# Patient Record
Sex: FEMALE | Race: BLACK OR AFRICAN AMERICAN | NOT HISPANIC OR LATINO | Employment: STUDENT | URBAN - METROPOLITAN AREA
[De-identification: names, ages, dates, MRNs, and addresses within clinical notes are randomized per-mention and may not be internally consistent; named-entity substitution may affect disease eponyms.]

---
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
## 2022-05-01 ENCOUNTER — HOSPITAL ENCOUNTER (EMERGENCY)
Facility: OTHER | Age: 22
Discharge: HOME | End: 2022-05-01
Attending: EMERGENCY MEDICINE
Payer: MEDICAID

## 2022-05-01 VITALS
WEIGHT: 114 LBS | OXYGEN SATURATION: 100 % | SYSTOLIC BLOOD PRESSURE: 97 MMHG | HEIGHT: 63 IN | BODY MASS INDEX: 20.2 KG/M2 | RESPIRATION RATE: 16 BRPM | HEART RATE: 62 BPM | TEMPERATURE: 99 F | DIASTOLIC BLOOD PRESSURE: 55 MMHG

## 2022-05-01 DIAGNOSIS — N12 PYELONEPHRITIS: Primary | ICD-10-CM

## 2022-05-01 DIAGNOSIS — N83.202 CYST OF LEFT OVARY: ICD-10-CM

## 2022-05-01 DIAGNOSIS — R11.0 NAUSEA: ICD-10-CM

## 2022-05-01 LAB
ALBUMIN SERPL BCP-MCNC: 4.1 G/DL (ref 3.5–5.2)
ALP SERPL-CCNC: 52 U/L (ref 55–135)
ALT SERPL W/O P-5'-P-CCNC: 17 U/L (ref 10–44)
ANION GAP SERPL CALC-SCNC: 12 MMOL/L (ref 8–16)
AST SERPL-CCNC: 14 U/L (ref 10–40)
B-HCG UR QL: NEGATIVE
BACTERIA #/AREA URNS HPF: ABNORMAL /HPF
BASOPHILS # BLD AUTO: 0.05 K/UL (ref 0–0.2)
BASOPHILS NFR BLD: 0.5 % (ref 0–1.9)
BILIRUB SERPL-MCNC: 0.5 MG/DL (ref 0.1–1)
BILIRUB UR QL STRIP: ABNORMAL
BUN SERPL-MCNC: 6 MG/DL (ref 6–20)
CALCIUM SERPL-MCNC: 8.8 MG/DL (ref 8.7–10.5)
CHLORIDE SERPL-SCNC: 106 MMOL/L (ref 95–110)
CLARITY UR: ABNORMAL
CO2 SERPL-SCNC: 21 MMOL/L (ref 23–29)
COLOR UR: ABNORMAL
CREAT SERPL-MCNC: 0.8 MG/DL (ref 0.5–1.4)
CTP QC/QA: YES
DIFFERENTIAL METHOD: ABNORMAL
EOSINOPHIL # BLD AUTO: 0 K/UL (ref 0–0.5)
EOSINOPHIL NFR BLD: 0.1 % (ref 0–8)
ERYTHROCYTE [DISTWIDTH] IN BLOOD BY AUTOMATED COUNT: 12.5 % (ref 11.5–14.5)
EST. GFR  (AFRICAN AMERICAN): >60 ML/MIN/1.73 M^2
EST. GFR  (NON AFRICAN AMERICAN): >60 ML/MIN/1.73 M^2
GLUCOSE SERPL-MCNC: 103 MG/DL (ref 70–110)
GLUCOSE UR QL STRIP: NEGATIVE
HCT VFR BLD AUTO: 40.6 % (ref 37–48.5)
HGB BLD-MCNC: 13.7 G/DL (ref 12–16)
HGB UR QL STRIP: ABNORMAL
HYALINE CASTS #/AREA URNS LPF: 0 /LPF
IMM GRANULOCYTES # BLD AUTO: 0.02 K/UL (ref 0–0.04)
IMM GRANULOCYTES NFR BLD AUTO: 0.2 % (ref 0–0.5)
KETONES UR QL STRIP: ABNORMAL
LEUKOCYTE ESTERASE UR QL STRIP: ABNORMAL
LIPASE SERPL-CCNC: 9 U/L (ref 4–60)
LYMPHOCYTES # BLD AUTO: 1.4 K/UL (ref 1–4.8)
LYMPHOCYTES NFR BLD: 14.4 % (ref 18–48)
MCH RBC QN AUTO: 33.7 PG (ref 27–31)
MCHC RBC AUTO-ENTMCNC: 33.7 G/DL (ref 32–36)
MCV RBC AUTO: 100 FL (ref 82–98)
MICROSCOPIC COMMENT: ABNORMAL
MONOCYTES # BLD AUTO: 0.5 K/UL (ref 0.3–1)
MONOCYTES NFR BLD: 5.3 % (ref 4–15)
NEUTROPHILS # BLD AUTO: 7.7 K/UL (ref 1.8–7.7)
NEUTROPHILS NFR BLD: 79.5 % (ref 38–73)
NITRITE UR QL STRIP: POSITIVE
NRBC BLD-RTO: 0 /100 WBC
PH UR STRIP: 6 [PH] (ref 5–8)
PLATELET # BLD AUTO: 211 K/UL (ref 150–450)
PMV BLD AUTO: 10.2 FL (ref 9.2–12.9)
POTASSIUM SERPL-SCNC: 3.7 MMOL/L (ref 3.5–5.1)
PROT SERPL-MCNC: 6.8 G/DL (ref 6–8.4)
PROT UR QL STRIP: ABNORMAL
RBC # BLD AUTO: 4.06 M/UL (ref 4–5.4)
RBC #/AREA URNS HPF: >100 /HPF (ref 0–4)
SODIUM SERPL-SCNC: 139 MMOL/L (ref 136–145)
SP GR UR STRIP: 1.02 (ref 1–1.03)
SQUAMOUS #/AREA URNS HPF: 7 /HPF
URN SPEC COLLECT METH UR: ABNORMAL
UROBILINOGEN UR STRIP-ACNC: 1 EU/DL
WBC # BLD AUTO: 9.66 K/UL (ref 3.9–12.7)
WBC #/AREA URNS HPF: 7 /HPF (ref 0–5)

## 2022-05-01 PROCEDURE — 63600175 PHARM REV CODE 636 W HCPCS: Performed by: NURSE PRACTITIONER

## 2022-05-01 PROCEDURE — 25000003 PHARM REV CODE 250: Performed by: NURSE PRACTITIONER

## 2022-05-01 PROCEDURE — 85025 COMPLETE CBC W/AUTO DIFF WBC: CPT | Performed by: NURSE PRACTITIONER

## 2022-05-01 PROCEDURE — 81000 URINALYSIS NONAUTO W/SCOPE: CPT | Performed by: NURSE PRACTITIONER

## 2022-05-01 PROCEDURE — 99285 EMERGENCY DEPT VISIT HI MDM: CPT | Mod: 25

## 2022-05-01 PROCEDURE — 96375 TX/PRO/DX INJ NEW DRUG ADDON: CPT

## 2022-05-01 PROCEDURE — 83690 ASSAY OF LIPASE: CPT | Performed by: NURSE PRACTITIONER

## 2022-05-01 PROCEDURE — 80053 COMPREHEN METABOLIC PANEL: CPT | Performed by: NURSE PRACTITIONER

## 2022-05-01 PROCEDURE — 96365 THER/PROPH/DIAG IV INF INIT: CPT

## 2022-05-01 PROCEDURE — 81025 URINE PREGNANCY TEST: CPT | Performed by: NURSE PRACTITIONER

## 2022-05-01 RX ORDER — HYDROCODONE BITARTRATE AND ACETAMINOPHEN 5; 325 MG/1; MG/1
1 TABLET ORAL EVERY 8 HOURS PRN
Qty: 9 TABLET | Refills: 0 | Status: SHIPPED | OUTPATIENT
Start: 2022-05-01 | End: 2022-05-05

## 2022-05-01 RX ORDER — HYDROCODONE BITARTRATE AND ACETAMINOPHEN 5; 325 MG/1; MG/1
1 TABLET ORAL
Status: COMPLETED | OUTPATIENT
Start: 2022-05-01 | End: 2022-05-01

## 2022-05-01 RX ORDER — ONDANSETRON 2 MG/ML
4 INJECTION INTRAMUSCULAR; INTRAVENOUS
Status: COMPLETED | OUTPATIENT
Start: 2022-05-01 | End: 2022-05-01

## 2022-05-01 RX ORDER — ONDANSETRON 4 MG/1
4 TABLET, ORALLY DISINTEGRATING ORAL EVERY 8 HOURS PRN
Qty: 20 TABLET | Refills: 0 | Status: SHIPPED | OUTPATIENT
Start: 2022-05-01 | End: 2022-11-21 | Stop reason: SDUPTHER

## 2022-05-01 RX ORDER — KETOROLAC TROMETHAMINE 30 MG/ML
15 INJECTION, SOLUTION INTRAMUSCULAR; INTRAVENOUS
Status: COMPLETED | OUTPATIENT
Start: 2022-05-01 | End: 2022-05-01

## 2022-05-01 RX ORDER — IBUPROFEN 600 MG/1
600 TABLET ORAL EVERY 6 HOURS PRN
Qty: 20 TABLET | Refills: 0 | Status: SHIPPED | OUTPATIENT
Start: 2022-05-01

## 2022-05-01 RX ADMIN — SODIUM CHLORIDE 1000 ML: 0.9 INJECTION, SOLUTION INTRAVENOUS at 07:05

## 2022-05-01 RX ADMIN — CEFTRIAXONE 1 G: 1 INJECTION, SOLUTION INTRAVENOUS at 07:05

## 2022-05-01 RX ADMIN — HYDROCODONE BITARTRATE AND ACETAMINOPHEN 1 TABLET: 5; 325 TABLET ORAL at 10:05

## 2022-05-01 RX ADMIN — ONDANSETRON 4 MG: 2 INJECTION INTRAMUSCULAR; INTRAVENOUS at 07:05

## 2022-05-01 RX ADMIN — KETOROLAC TROMETHAMINE 15 MG: 30 INJECTION, SOLUTION INTRAMUSCULAR at 07:05

## 2022-05-02 ENCOUNTER — TELEPHONE (OUTPATIENT)
Dept: OBSTETRICS AND GYNECOLOGY | Facility: CLINIC | Age: 22
End: 2022-05-02
Payer: MEDICAID

## 2022-05-02 NOTE — ED TRIAGE NOTES
Pt presents to ED c/o lower abdominal pain, L flank pain starting at approx 2pm. Also states that she vomited once around the same time pain started and experienced 1 episode of diarrhea. Has not vomited or had diarrhea since. Denies vaginal symptoms or urinary symptoms. Denies fever at home. States has only tried to eat once today and was not able to hold it down.

## 2022-05-02 NOTE — CONSULTS
Consult Note  Gynecology      SUBJECTIVE:     History of Present Illness:  Julius Peguero is a 21 y.o. G0 currently being evaluated for abdominal pain. The patient reports that she experienced severe, acute left-sided abdominal pain two hours ago. She states she experienced sweating, nausea, and one episode of vomiting due to the pain. Denies fever or pain with urination. She reports that she has experienced pain in her left lower abdomen during intercourse. She was evaluated for this complaint on Friday at [facility], where she was treated for BV. She denies pain with urination or defecation. She is currently on her menstrual cycle and states her cycles are typically very painful.   Since being evaluated in the ED the patient has not experienced any further episodes of pain and is tolerating sips of water.      GYN History:  Currently on menstrual cycle, cycles occur every 26-30 days, last 3-7 days, described as light bleeding but painful  Last pap - n/a  Sexually active, uses condoms occasionally for contraception. She has previously used Depo  Denies h/o STDs, fibroids, or ovarian cysts      PMHx: No past medical history on file.    PSHx:   Past Surgical History:   Procedure Laterality Date    MOUTH SURGERY         All: Review of patient's allergies indicates:  No Known Allergies    Meds: (Not in a hospital admission)      SH:   Social History     Socioeconomic History    Marital status: Single   Tobacco Use    Smoking status: Never Smoker    Smokeless tobacco: Never Used   Substance and Sexual Activity    Alcohol use: No    Drug use: Yes     Types: Marijuana       FH: No family history on file.    Review of Systems:  Constitutional: no fever or chills  Respiratory: no cough or shortness of breath  Cardiovascular: no chest pain or palpitations  Gastrointestinal: +vomiting, tolerating diet, negative for change in bowel habits  Genitourinary: +vaginal bleeding, no hematuria or dysuria, negative for  urinary incontinence  Integument/Breast: no rash or pruritis, negative for breast lump, nipple discharge and skin lesion(s)  Hematologic/Lymphatic: no easy bruising or lymphadenopathy     OBJECTIVE:     Temp:  [98.6 °F (37 °C)] 98.6 °F (37 °C)  Pulse:  [54-75] 62  Resp:  [16-19] 16  SpO2:  [98 %-100 %] 100 %  BP: ()/(55-74) 97/55    Recent Results (from the past 24 hour(s))   Urinalysis, Reflex to Urine Culture Urine, Clean Catch    Collection Time: 05/01/22  6:41 PM    Specimen: Urine   Result Value Ref Range    Specimen UA Urine, Clean Catch     Color, UA Red (A) Yellow, Straw, Sandra    Appearance, UA Cloudy (A) Clear    pH, UA 6.0 5.0 - 8.0    Specific Gravity, UA 1.025 1.005 - 1.030    Protein, UA 2+ (A) Negative    Glucose, UA Negative Negative    Ketones, UA Trace (A) Negative    Bilirubin (UA) 1+ (A) Negative    Occult Blood UA 3+ (A) Negative    Nitrite, UA Positive (A) Negative    Urobilinogen, UA 1.0 <2.0 EU/dL    Leukocytes, UA Trace (A) Negative   Urinalysis Microscopic    Collection Time: 05/01/22  6:41 PM   Result Value Ref Range    RBC, UA >100 (H) 0 - 4 /hpf    WBC, UA 7 (H) 0 - 5 /hpf    Bacteria Moderate (A) None-Occ /hpf    Squam Epithel, UA 7 /hpf    Hyaline Casts, UA 0 0-1/lpf /lpf    Microscopic Comment SEE COMMENT    POCT urine pregnancy    Collection Time: 05/01/22  6:43 PM   Result Value Ref Range    POC Preg Test, Ur Negative Negative     Acceptable Yes    CBC W/ AUTO DIFFERENTIAL    Collection Time: 05/01/22  6:48 PM   Result Value Ref Range    WBC 9.66 3.90 - 12.70 K/uL    RBC 4.06 4.00 - 5.40 M/uL    Hemoglobin 13.7 12.0 - 16.0 g/dL    Hematocrit 40.6 37.0 - 48.5 %     (H) 82 - 98 fL    MCH 33.7 (H) 27.0 - 31.0 pg    MCHC 33.7 32.0 - 36.0 g/dL    RDW 12.5 11.5 - 14.5 %    Platelets 211 150 - 450 K/uL    MPV 10.2 9.2 - 12.9 fL    Immature Granulocytes 0.2 0.0 - 0.5 %    Gran # (ANC) 7.7 1.8 - 7.7 K/uL    Immature Grans (Abs) 0.02 0.00 - 0.04 K/uL    Lymph # 1.4  1.0 - 4.8 K/uL    Mono # 0.5 0.3 - 1.0 K/uL    Eos # 0.0 0.0 - 0.5 K/uL    Baso # 0.05 0.00 - 0.20 K/uL    nRBC 0 0 /100 WBC    Gran % 79.5 (H) 38.0 - 73.0 %    Lymph % 14.4 (L) 18.0 - 48.0 %    Mono % 5.3 4.0 - 15.0 %    Eosinophil % 0.1 0.0 - 8.0 %    Basophil % 0.5 0.0 - 1.9 %    Differential Method Automated    Comp. Metabolic Panel    Collection Time: 05/01/22  6:48 PM   Result Value Ref Range    Sodium 139 136 - 145 mmol/L    Potassium 3.7 3.5 - 5.1 mmol/L    Chloride 106 95 - 110 mmol/L    CO2 21 (L) 23 - 29 mmol/L    Glucose 103 70 - 110 mg/dL    BUN 6 6 - 20 mg/dL    Creatinine 0.8 0.5 - 1.4 mg/dL    Calcium 8.8 8.7 - 10.5 mg/dL    Total Protein 6.8 6.0 - 8.4 g/dL    Albumin 4.1 3.5 - 5.2 g/dL    Total Bilirubin 0.5 0.1 - 1.0 mg/dL    Alkaline Phosphatase 52 (L) 55 - 135 U/L    AST 14 10 - 40 U/L    ALT 17 10 - 44 U/L    Anion Gap 12 8 - 16 mmol/L    eGFR if African American >60 >60 mL/min/1.73 m^2    eGFR if non African American >60 >60 mL/min/1.73 m^2   Lipase    Collection Time: 05/01/22  6:48 PM   Result Value Ref Range    Lipase 9 4 - 60 U/L         Physical Exam:  GEN: No apparent distress. Alert and oriented  CV: Regular rate and rhythm.  LUNGS: Normal work of breathing. No distress.  ABDOMEN: Soft, mild diffuse tenderness in lower abdomen, non-distended. No guarding or rebound tenderness.  EXT: No erythema, no edema  EXT. GENITALIA: appear normal, no lesions noted  BIMANUAL: minimal bright red vaginal bleeding, narrow pelvic arch, diffuse suprapubic and adnexal pain, no masses palpated    EXAMINATION:  PELVIC ULTRASOUND     CLINICAL HISTORY:  Left adnexal mass.  CT renal stone of 05/01/2022 describing a 4.5 x 4.4 x 5 cm left adnexal cyst, which may be ovarian in origin.     TECHNIQUE:  Real-time ultrasound of the pelvis was performed transabdominally and transvaginally.     COMPARISON:  CT renal stone study 05/01/2022     FINDINGS:  The uterus measures 7.1 x 3.6 x 4.1 cm.  The endometrial stripe  measures 7.3 mm.  There are no uterine masses.     The right ovary measures 3.7 x 2.3 x 4.2 cm. Arterial flow is present.     The enlarged left ovary measures 6.6 x 4.0 x 5.3 cm. Arterial flow is present.  There is a 5.1 x 3.4 x 4.4 cm cyst.     There is no free fluid appreciated in the pelvis on ultrasound.  Small fluid is appreciated in the right pelvis on recent CT.     Impression:  Large left ovarian cyst.  No uterine masses.    ASSESSMENT/PLAN:     Assessment:  22 yo G0 who presents with acute left-sided abdominal pain with left ovarian cyst    Plan:  1. Ovarian cyst  - 5cm left ovarian cyst noted on pelvic ultrasound, arterial flow noted to ovary   - Vital signs stable  - Diffuse, mild abdominal pain on exam, no guarding or rebound  - Based on presentation, physical exam, and ultrasound findings not concerned for ovarian torsion at this time, however discussed the possibility of torsion with patient and enforced return precautions  - Will follow up with patient in [facility] as she does not have a primary Gyn, consider initiation of OCPs   - Recommend repeat pelvic ultrasound in 6-8 weeks ensure resolution of cyst    2. UTI  - Treatment per ED physician      Tash Santiago M.D.  OB/GYN PGY-2      Discussed plan with staff who was in agreement.

## 2022-05-02 NOTE — TELEPHONE ENCOUNTER
----- Message from Tash Santiago MD sent at 5/1/2022 11:06 PM CDT -----  Hi,    Can we please schedule this patient an ED follow up appointment in our Gyn clinic?    Thank you,  Tash Santiago M.D.  OB/GYN PGY-2

## 2022-05-02 NOTE — ED PROVIDER NOTES
"     Source of History:  Patient    Chief complaint:  Abdominal Pain and Vomiting (Pt c.o lower abd pain and left mid abd pain with vomiting onset 2 hours ago. Vomit x 1.  Pt denies urinary symptoms. Pt denies fever.  AAO x 3 nadn skin w.d mucus membrane m/p)      HPI:  Julius Peguero is a 21 y.o. female presenting with left flank and left lower abdominal pain with acute onset 2 hours ago.  She she states that she threw up the 1 meal that she is eaten today secondary to the pain.  Pain is described as sharp and stabbing.  Pain was 10/10 but has decreased since onset.She denies fever, chills, dysuria, urgency, frequency, constipation, diarrhea, pelvic pain and vaginal discharge    This is the extent to the patients complaints today here in the emergency department.    ROS: As per HPI and below:  General: No fever.  No chills.  Eyes: No visual changes.  ENT: No sore throat. No ear pain  Head: No headache.    Chest: No shortness of breath.  Cardiovascular: No chest pain.  Abdomen:+abdominal pain.  +nausea +vomiting.  Genito-Urinary: No abnormal urination. No abnormal vaginal discharge. No pelvic pain  Neurologic: No focal weakness.  No numbness.  MSK: no back pain.  Integument: No rashes or lesions.  Hematologic: No easy bruising.  Endocrine: No excessive thirst or urination.    Review of patient's allergies indicates:  No Known Allergies    PMH:  As per HPI and below:  No past medical history on file.  Past Surgical History:   Procedure Laterality Date    MOUTH SURGERY         Social History     Tobacco Use    Smoking status: Never Smoker    Smokeless tobacco: Never Used   Substance Use Topics    Alcohol use: No    Drug use: Yes     Types: Marijuana       Physical Exam:    BP (!) 97/55   Pulse 62   Temp 98.6 °F (37 °C) (Oral)   Resp 16   Ht 5' 3" (1.6 m)   Wt 51.7 kg (114 lb)   SpO2 100%   BMI 20.19 kg/m²   Nursing note and vital signs reviewed.  Appearance: Appears uncomfortable.  Eyes: No conjunctival " injection.  ENT: Oropharynx clear.    Chest/ Respiratory: Clear to auscultation bilaterally.  Good air movement.  No wheezes.  No rhonchi. No rales. No accessory muscle use.  Cardiovascular: Regular rate and rhythm.  No murmurs. No gallops. No rubs.  Abdomen: Soft.  Not distended.  TTP in LLQ and left flank  No guarding.  No rebound. Non-peritoneal.  Musculoskeletal: Good range of motion all joints.  No deformities.  Neck supple.  No meningismus.  Skin: No rashes seen.  Good turgor.  No abrasions.  No ecchymoses.  Neurologic: Motor intact.  Sensation intact.  Cerebellar intact.  Cranial nerves intact.  Mental Status:  Alert and oriented x 3.  Appropriate, conversant    Labs that have been ordered have been independently reviewed and interpreted by myself.        Initial Impression/ Differential Dx:  Urgent evaluation of 21-year-old female presenting with abdominal pain and 1 episode of vomiting.  Patient is afebrile, not toxic appearing hemodynamically stable.  On exam she has mild tenderness in the left lower quadrant and left flank.  Plan for labs, IV fluids, antiemetics, analgesia in CT renal and continue to reassess.    Differential Diagnosis includes, but is not limited to:  AAA, aortic dissection, mesenteric ischemia, perforated viscous, MI/ACS, SBO/volvulus, incarcerated/strangulated hernia, intussusception, ileus, appendicitis, cholecystitis, cholangitis, diverticulitis, esophagitis, hepatitis, nephrolithiasis, pancreatitis, gastroenteritis, colitis, IBD/IBS, biliary colic, GERD, PUD, constipation, UTI/pyelonephritis,  disorder.      MDM:        ED Course as of 05/01/22 2324   Sun May 01, 2022   1901 Preg Test, Ur: Negative [CU]   1920 CBC W/ AUTO DIFFERENTIAL(!)  No leukocytosis or left shift, stable H&H [CU]   1920 Comp. Metabolic Panel(!)  No significant electrolyte abnormalities, normal kidney function, no elevation of LFTs. [CU]   1920 NITRITE UA(!): Positive [CU]   1920 Leukocytes, UA(!): Trace [CU]    1920 RBC, UA(!): >100  Patient currently on menstrual cycle.  [CU]   1921 Occult Blood UA(!): 3+ [CU]   1921 Urinalysis, Reflex to Urine Culture Urine, Clean Catch(!)  Consistent with UTI, will treat for pyelonephritis given left flank pain. [CU]   2154 Large left ovarian cyst. 5.1 x 3.4 x 4.4 cm cyst. Will discuss with OBGYN. At bedside to reassess patient and inform her of her lab and imaging results.  She states that she is feeling better and has had improvement but not complete resolution of her pain.  Will give additional analgesia. Reports complete resolution of her nausea.  States that she thinks the nausea and vomiting with secondary to pain. [CU]   2158 Discussed with OBGYN.  Per OB cysts less than 6 cm can be managed outpatient however given acute onset of pain with associate nausea, concerned for intermittent torsion secondary to the cyst. OB at bedside for exam.  Please see consult note.They will contact her for an appointment to establish care and continue monitoring.  Will discharge patient home with NSAIDs and a few pain meds and close OBGYN follow-up.  Patient given strict return to ED precautions and discharged home in good condition.Patient educated on on signs and symptoms to monitor for and when to return to ED. Patient verbalized understanding agrees with treatment plan. All questions and concerns addressed.        [CU]      ED Course User Index  [CU] Andrew Wood NP                 Diagnostic Impression:    1. Pyelonephritis    2. Cyst of left ovary    3. Nausea         ED Disposition Condition    Discharge Good          ED Prescriptions     Medication Sig Dispense Start Date End Date Auth. Provider    ibuprofen (ADVIL,MOTRIN) 600 MG tablet Take 1 tablet (600 mg total) by mouth every 6 (six) hours as needed (nausea). 20 tablet 5/1/2022  Andrew Wood NP    HYDROcodone-acetaminophen (NORCO) 5-325 mg per tablet Take 1 tablet by mouth every 8 (eight) hours as needed for Pain. 9 tablet  5/1/2022 5/4/2022 Andrew Wood NP    ondansetron (ZOFRAN-ODT) 4 MG TbDL Take 1 tablet (4 mg total) by mouth every 8 (eight) hours as needed (nausea). 20 tablet 5/1/2022  Andrew Wood NP        Follow-up Information     Follow up With Specialties Details Why Contact Info    [facility] Obstetrics and Gynecology Schedule an appointment as soon as possible for a visit   [address], [address]  [facility] [address]  [phone]           Andrew Wood NP  05/01/22 7370

## 2022-05-04 ENCOUNTER — OFFICE VISIT (OUTPATIENT)
Dept: OBSTETRICS AND GYNECOLOGY | Facility: CLINIC | Age: 22
End: 2022-05-04
Payer: MEDICAID

## 2022-05-04 ENCOUNTER — TELEPHONE (OUTPATIENT)
Dept: OBSTETRICS AND GYNECOLOGY | Facility: CLINIC | Age: 22
End: 2022-05-04
Payer: MEDICAID

## 2022-05-04 ENCOUNTER — ANESTHESIA EVENT (OUTPATIENT)
Dept: SURGERY | Facility: OTHER | Age: 22
End: 2022-05-04
Payer: MEDICAID

## 2022-05-04 VITALS
DIASTOLIC BLOOD PRESSURE: 58 MMHG | SYSTOLIC BLOOD PRESSURE: 98 MMHG | HEIGHT: 63 IN | WEIGHT: 113.75 LBS | BODY MASS INDEX: 20.16 KG/M2

## 2022-05-04 DIAGNOSIS — N83.202 LEFT OVARIAN CYST: Primary | ICD-10-CM

## 2022-05-04 PROCEDURE — 1159F PR MEDICATION LIST DOCUMENTED IN MEDICAL RECORD: ICD-10-PCS | Mod: CPTII,,,

## 2022-05-04 PROCEDURE — 99999 PR PBB SHADOW E&M-EST. PATIENT-LVL III: CPT | Mod: PBBFAC,,,

## 2022-05-04 PROCEDURE — 99204 OFFICE O/P NEW MOD 45 MIN: CPT | Mod: S$PBB,,,

## 2022-05-04 PROCEDURE — 99213 OFFICE O/P EST LOW 20 MIN: CPT | Mod: PBBFAC,PN

## 2022-05-04 PROCEDURE — 3008F BODY MASS INDEX DOCD: CPT | Mod: CPTII,,,

## 2022-05-04 PROCEDURE — 99999 PR PBB SHADOW E&M-EST. PATIENT-LVL III: ICD-10-PCS | Mod: PBBFAC,,,

## 2022-05-04 PROCEDURE — 3074F PR MOST RECENT SYSTOLIC BLOOD PRESSURE < 130 MM HG: ICD-10-PCS | Mod: CPTII,,,

## 2022-05-04 PROCEDURE — 3078F DIAST BP <80 MM HG: CPT | Mod: CPTII,,,

## 2022-05-04 PROCEDURE — 3008F PR BODY MASS INDEX (BMI) DOCUMENTED: ICD-10-PCS | Mod: CPTII,,,

## 2022-05-04 PROCEDURE — 3074F SYST BP LT 130 MM HG: CPT | Mod: CPTII,,,

## 2022-05-04 PROCEDURE — 1159F MED LIST DOCD IN RCRD: CPT | Mod: CPTII,,,

## 2022-05-04 PROCEDURE — 3078F PR MOST RECENT DIASTOLIC BLOOD PRESSURE < 80 MM HG: ICD-10-PCS | Mod: CPTII,,,

## 2022-05-04 PROCEDURE — 99204 PR OFFICE/OUTPT VISIT, NEW, LEVL IV, 45-59 MIN: ICD-10-PCS | Mod: S$PBB,,,

## 2022-05-04 RX ORDER — MUPIROCIN 20 MG/G
OINTMENT TOPICAL
Status: CANCELLED | OUTPATIENT
Start: 2022-05-04

## 2022-05-04 RX ORDER — SODIUM CHLORIDE 9 MG/ML
INJECTION, SOLUTION INTRAVENOUS CONTINUOUS
Status: CANCELLED | OUTPATIENT
Start: 2022-05-04

## 2022-05-04 NOTE — LETTER
May 4, 2022      [facility]  [address], [address]  [facility] [address]  Phone: [phone]  Fax: [phone]       Patient: Julius Pegureo   YOB: 2000  Date of Visit: 05/04/2022    To Whom It May Concern:    Dewey Peguero  was at [facility] on 05/04/2022. The patient may return to work/school on 5/9/2022 with restriction of no lifting >10 lbs. If you have any questions or concerns, or if I can be of further assistance, please do not hesitate to contact me.    Sincerely,    Alysia Lazo MD

## 2022-05-04 NOTE — H&P
[facility] - OB GYN  History & Physical  Gynecology    SUBJECTIVE:     Chief Complaint/Reason for Admission: Left ovarian cyst    History of Present Illness:  Patient is a 21 y.o.  who presents with for laparoscopic cystectomy of left ovarian cyst.  Patient presented to ER on  with complaints of left sided abdominal pain that was associated with one episode of nausea and vomiting. Patient underwent CT scan that revealed a left ovarian cyst. TVUS revealed a 5.1 x 3.4 x 4.4 cm left adnexal cyst. Patient desires surgical removal of cyst.     (Not in a hospital admission)      Review of patient's allergies indicates:  No Known Allergies    History reviewed. No pertinent past medical history.  Past Surgical History:   Procedure Laterality Date    MOUTH SURGERY       History reviewed. No pertinent family history.  Social History     Tobacco Use    Smoking status: Never Smoker    Smokeless tobacco: Never Used   Substance Use Topics    Alcohol use: No    Drug use: Yes     Types: Marijuana       Review of Systems:  Denies weight loss/gain  Denies chest pain or shortness of breath  Denies orthopnea or lower leg edema  Denies nausea/vomiting  Denies numbness, weakness or dizziness  Denies current alcohol or substance abuse     OBJECTIVE:     Vital Signs:  To be done upon arrival    Physical Exam:  Well appearing, well nourished, in no acute distress  Normal pulmonary effort  Bowel sounds normal, no tenderness, organomegaly, masses or hernia  No lower leg edema, peripheral pulses intact  Normal gait  Alert and oriented x3, normal mood and affect        Diagnostic Results:  US Pelvis Comp with Transvag NON-OB (xpd  Order: [id number]   Status: Final result     Visible to patient: Yes (not seen)     Next appt: None     0 Result Notes    Details    Reading Physician Reading Date Result Priority   Lida Duque MD  [phone] 2022 STAT     Narrative & Impression  EXAMINATION:  PELVIC  ULTRASOUND     CLINICAL HISTORY:  Left adnexal mass.  CT renal stone of 05/01/2022 describing a 4.5 x 4.4 x 5 cm left adnexal cyst, which may be ovarian in origin.     TECHNIQUE:  Real-time ultrasound of the pelvis was performed transabdominally and transvaginally.     COMPARISON:  CT renal stone study 05/01/2022     FINDINGS:  The uterus measures 7.1 x 3.6 x 4.1 cm.  The endometrial stripe measures 7.3 mm.  There are no uterine masses.     The right ovary measures 3.7 x 2.3 x 4.2 cm. Arterial flow is present.     The enlarged left ovary measures 6.6 x 4.0 x 5.3 cm. Arterial flow is present.  There is a 5.1 x 3.4 x 4.4 cm cyst.     There is no free fluid appreciated in the pelvis on ultrasound.  Small fluid is appreciated in the right pelvis on recent CT.     Impression:     Large left ovarian cyst.     No uterine masses.        Electronically signed by: Lida Duque  Date:                                            05/01/2022  Time:                                           21:49         ASSESSMENT/PLAN:     1. Left ovarian cyst  - 5.1 x 3.4 x 4.4 cm left adnexal cyst, simple appearing on US  - Management options discussed in clinic and patient elected for surgical management  - Plan for laparoscopic cystectomy   - Risks and benefits explained in detail to patient, including but not limited to damage to internal organs, including but not limited to bowel, bladder, uterus, tubes, ovaries, nerves, arteries, veins, possible need for blood transfusion, possible need for exploratory laparotomy/oophorectomy. Patient is aware of all risks and desires laparoscopy. All questions answered. Consents signed.     Binta Freeman MD  PGY-1 OB/GYN    Seen and examined.  Agree with note.  All questions answered  SUMAN Lazo MD

## 2022-05-04 NOTE — H&P (VIEW-ONLY)
[facility] - OB GYN  History & Physical  Gynecology    SUBJECTIVE:     Chief Complaint/Reason for Admission: Left ovarian cyst    History of Present Illness:  Patient is a 21 y.o.  who presents with for laparoscopic cystectomy of left ovarian cyst.  Patient presented to ER on  with complaints of left sided abdominal pain that was associated with one episode of nausea and vomiting. Patient underwent CT scan that revealed a left ovarian cyst. TVUS revealed a 5.1 x 3.4 x 4.4 cm left adnexal cyst. Patient desires surgical removal of cyst.     (Not in a hospital admission)      Review of patient's allergies indicates:  No Known Allergies    History reviewed. No pertinent past medical history.  Past Surgical History:   Procedure Laterality Date    MOUTH SURGERY       History reviewed. No pertinent family history.  Social History     Tobacco Use    Smoking status: Never Smoker    Smokeless tobacco: Never Used   Substance Use Topics    Alcohol use: No    Drug use: Yes     Types: Marijuana       Review of Systems:  Denies weight loss/gain  Denies chest pain or shortness of breath  Denies orthopnea or lower leg edema  Denies nausea/vomiting  Denies numbness, weakness or dizziness  Denies current alcohol or substance abuse     OBJECTIVE:     Vital Signs:  To be done upon arrival    Physical Exam:  Well appearing, well nourished, in no acute distress  Normal pulmonary effort  Bowel sounds normal, no tenderness, organomegaly, masses or hernia  No lower leg edema, peripheral pulses intact  Normal gait  Alert and oriented x3, normal mood and affect        Diagnostic Results:  US Pelvis Comp with Transvag NON-OB (xpd  Order: [id number]   Status: Final result     Visible to patient: Yes (not seen)     Next appt: None     0 Result Notes    Details    Reading Physician Reading Date Result Priority   Lida Duque MD  [phone] 2022 STAT     Narrative & Impression  EXAMINATION:  PELVIC  ULTRASOUND     CLINICAL HISTORY:  Left adnexal mass.  CT renal stone of 05/01/2022 describing a 4.5 x 4.4 x 5 cm left adnexal cyst, which may be ovarian in origin.     TECHNIQUE:  Real-time ultrasound of the pelvis was performed transabdominally and transvaginally.     COMPARISON:  CT renal stone study 05/01/2022     FINDINGS:  The uterus measures 7.1 x 3.6 x 4.1 cm.  The endometrial stripe measures 7.3 mm.  There are no uterine masses.     The right ovary measures 3.7 x 2.3 x 4.2 cm. Arterial flow is present.     The enlarged left ovary measures 6.6 x 4.0 x 5.3 cm. Arterial flow is present.  There is a 5.1 x 3.4 x 4.4 cm cyst.     There is no free fluid appreciated in the pelvis on ultrasound.  Small fluid is appreciated in the right pelvis on recent CT.     Impression:     Large left ovarian cyst.     No uterine masses.        Electronically signed by: Lida Duque  Date:                                            05/01/2022  Time:                                           21:49         ASSESSMENT/PLAN:     1. Left ovarian cyst  - 5.1 x 3.4 x 4.4 cm left adnexal cyst, simple appearing on US  - Management options discussed in clinic and patient elected for surgical management  - Plan for laparoscopic cystectomy   - Risks and benefits explained in detail to patient, including but not limited to damage to internal organs, including but not limited to bowel, bladder, uterus, tubes, ovaries, nerves, arteries, veins, possible need for blood transfusion, possible need for exploratory laparotomy/oophorectomy. Patient is aware of all risks and desires laparoscopy. All questions answered. Consents signed.     Binta Freeman MD  PGY-1 OB/GYN    Seen and examined.  Agree with note.  All questions answered  SUMAN Lazo MD

## 2022-05-04 NOTE — PROGRESS NOTES
Gyn Clinic  [facility]st[facility] Gyne[facility]       SUBJECTIVE:     Chief Complaint: Other (ED Follow-Up)     History of Present Illness:  Patient is a 22 yo G0 who presents to clinic as an ER follow up. Patient presented to ER on  with complaints of left sided abdominal pain that was associated with one episode of nausea and vomiting. Patient underwent CT scan that revealed a left ovarian cyst. TVUS revealed a 5.1 x 3.4 x 4.4 cm left adnexal cyst. Patient did not have a primary OB and was instructed to follow up with our clinic. Patient reports regular painful monthly cycles with light bleeding. She reports intermittent LLQ pain during intercourse and her cycle. She states that the pain is sharp but usually doesn't last for very long. She denies any abnormal vaginal discharge. She is currently on her menstrual cycle.     OBGYN History  G0  Abnormal pap smears: N/A  STDs: declines  Contraception: currently none but just recently picked up Rx for Nuva Ring  Menses: regular monthly cycles, reports light bleeding but states they julius painful painful    Outpatient Medications Marked as Taking for the 22 encounter (Office Visit) with Binta Freeman MD   Medication Sig Dispense Refill    HYDROcodone-acetaminophen (NORCO) 5-325 mg per tablet Take 1 tablet by mouth every 8 (eight) hours as needed for Pain. 9 tablet 0    ondansetron (ZOFRAN-ODT) 4 MG TbDL Take 1 tablet (4 mg total) by mouth every 8 (eight) hours as needed (nausea). 20 tablet 0      Review of patient's allergies indicates:  No Known Allergies  History reviewed. No pertinent past medical history.  Past Surgical History:   Procedure Laterality Date    MOUTH SURGERY       OB History        0    Para   0    Term   0       0    AB   0    Living   0       SAB   0    IAB   0    Ectopic   0    Multiple   0    Live Births   0               History reviewed. No pertinent family history.  Social History     Tobacco Use    Smoking status: Never  "Smoker    Smokeless tobacco: Never Used   Substance Use Topics    Alcohol use: No    Drug use: Yes     Types: Marijuana       Review of Systems   Constitutional: Negative for chills and fever.   HENT: Negative for nasal congestion.    Eyes: Negative for visual disturbance.   Respiratory: Negative for cough and shortness of breath.    Cardiovascular: Negative for chest pain.   Gastrointestinal: Positive for abdominal pain (LLQ). Negative for constipation, diarrhea, nausea and vomiting.   Endocrine: Negative for hot flashes.   Genitourinary: Positive for pelvic pain (left sided pain) and vaginal bleeding (on menses). Negative for dysuria and vaginal discharge.   Musculoskeletal: Negative for back pain.   Neurological: Negative for headaches.   Psychiatric/Behavioral: Negative for depression.         OBJECTIVE:   BP (!) 98/58   Ht 5' 3" (1.6 m)   Wt 51.6 kg (113 lb 12.1 oz)   LMP 05/01/2022    Physical Exam  Constitutional:       Appearance: Normal appearance.   Genitourinary:      Right Labia: No rash, tenderness or lesions.     Left Labia: No tenderness, lesions or rash.     Vaginal bleeding (menstrual bleeding) present.      No vaginal discharge or tenderness.        Right Adnexa: not tender, not full and no mass present.     Left Adnexa: tender and full.     No cervical motion tenderness or discharge.      Uterus is not enlarged or tender.   Pulmonary:      Effort: Pulmonary effort is normal.   Abdominal:      General: Abdomen is flat. There is no distension.      Palpations: Abdomen is soft.      Tenderness: There is abdominal tenderness (LLQ). There is no guarding or rebound.   Musculoskeletal:         General: Normal range of motion.   Neurological:      General: No focal deficit present.      Mental Status: She is alert and oriented to person, place, and time.   Skin:     General: Skin is warm and dry.   Psychiatric:         Mood and Affect: Mood normal.         Behavior: Behavior normal.   Vitals " reviewed. Exam conducted with a chaperone present.          ASSESSMENT:   The encounter diagnosis was Left ovarian cyst.      Plan:     Left ovarian cyst  -  5.1 x 3.4 x 4.4 cm left adnexal cyst, simple appearing on US  -  Discussed management options including expectant management with follow up US in 6-12 weeks vs surgical management with laparoscopic cystectomy. Risks and benefits of both options were      discussed.  -  Patient desires surgical management.   -  Risks and benefits explained in detail to patient, including but not limited to damage to internal organs, including but not limited to bowel, bladder, uterus, tubes, ovaries, nerves, arteries, veins, possible need for blood transfusion, possible need for exploratory laparotomy/oophorectomy. Patient is aware of all risks and desires laparoscopy. All questions answered. Consents signed.   -  Surgery scheduled for tomorrow at 12  -  Patient instructed to report to surgery center at 10 am, nothing to eat or drink after midnight  -  Recovery timeline discussed with patient   -  Case Request Operating Room: EXCISION, CYST, OVARY, LAPAROSCOPIC    Binta Freeman MD  PGY-1 OB/GYN    Seen and examined.  Agree with note.  All questions answered  SUMAN Lazo MD

## 2022-05-05 ENCOUNTER — ANESTHESIA (OUTPATIENT)
Dept: SURGERY | Facility: OTHER | Age: 22
End: 2022-05-05
Payer: MEDICAID

## 2022-05-05 ENCOUNTER — HOSPITAL ENCOUNTER (OUTPATIENT)
Facility: OTHER | Age: 22
Discharge: HOME | End: 2022-05-05
Attending: OBSTETRICS & GYNECOLOGY | Admitting: OBSTETRICS & GYNECOLOGY
Payer: MEDICAID

## 2022-05-05 DIAGNOSIS — N83.202 LEFT OVARIAN CYST: Primary | ICD-10-CM

## 2022-05-05 LAB
B-HCG UR QL: NEGATIVE
CTP QC/QA: YES
CTP QC/QA: YES
SARS-COV-2 AG RESP QL IA.RAPID: NEGATIVE

## 2022-05-05 PROCEDURE — 88305 TISSUE EXAM BY PATHOLOGIST: ICD-10-PCS | Mod: 26,,, | Performed by: PATHOLOGY

## 2022-05-05 PROCEDURE — 25000003 PHARM REV CODE 250

## 2022-05-05 PROCEDURE — 25000003 PHARM REV CODE 250: Performed by: ANESTHESIOLOGY

## 2022-05-05 PROCEDURE — 71000015 HC POSTOP RECOV 1ST HR: Performed by: OBSTETRICS & GYNECOLOGY

## 2022-05-05 PROCEDURE — 71000033 HC RECOVERY, INTIAL HOUR: Performed by: OBSTETRICS & GYNECOLOGY

## 2022-05-05 PROCEDURE — 37000009 HC ANESTHESIA EA ADD 15 MINS: Performed by: OBSTETRICS & GYNECOLOGY

## 2022-05-05 PROCEDURE — 71000016 HC POSTOP RECOV ADDL HR: Performed by: OBSTETRICS & GYNECOLOGY

## 2022-05-05 PROCEDURE — 25000003 PHARM REV CODE 250: Performed by: NURSE ANESTHETIST, CERTIFIED REGISTERED

## 2022-05-05 PROCEDURE — 58662 PR LAP,FULGURATE/EXCISE LESIONS: ICD-10-PCS | Mod: ,,, | Performed by: OBSTETRICS & GYNECOLOGY

## 2022-05-05 PROCEDURE — 27201423 OPTIME MED/SURG SUP & DEVICES STERILE SUPPLY: Performed by: OBSTETRICS & GYNECOLOGY

## 2022-05-05 PROCEDURE — 00840 ANES IPER PX LOWER ABD NOS: CPT | Performed by: OBSTETRICS & GYNECOLOGY

## 2022-05-05 PROCEDURE — 36000709 HC OR TIME LEV III EA ADD 15 MIN: Performed by: OBSTETRICS & GYNECOLOGY

## 2022-05-05 PROCEDURE — 63600175 PHARM REV CODE 636 W HCPCS: Performed by: ANESTHESIOLOGY

## 2022-05-05 PROCEDURE — 71000039 HC RECOVERY, EACH ADD'L HOUR: Performed by: OBSTETRICS & GYNECOLOGY

## 2022-05-05 PROCEDURE — 36000708 HC OR TIME LEV III 1ST 15 MIN: Performed by: OBSTETRICS & GYNECOLOGY

## 2022-05-05 PROCEDURE — 88305 TISSUE EXAM BY PATHOLOGIST: CPT | Performed by: PATHOLOGY

## 2022-05-05 PROCEDURE — 81025 URINE PREGNANCY TEST: CPT | Performed by: OBSTETRICS & GYNECOLOGY

## 2022-05-05 PROCEDURE — 88305 TISSUE EXAM BY PATHOLOGIST: CPT | Mod: 26,,, | Performed by: PATHOLOGY

## 2022-05-05 PROCEDURE — 37000008 HC ANESTHESIA 1ST 15 MINUTES: Performed by: OBSTETRICS & GYNECOLOGY

## 2022-05-05 PROCEDURE — 58662 LAPAROSCOPY EXCISE LESIONS: CPT | Mod: ,,, | Performed by: OBSTETRICS & GYNECOLOGY

## 2022-05-05 PROCEDURE — 63600175 PHARM REV CODE 636 W HCPCS: Performed by: NURSE ANESTHETIST, CERTIFIED REGISTERED

## 2022-05-05 RX ORDER — SODIUM CHLORIDE 0.9 % (FLUSH) 0.9 %
3 SYRINGE (ML) INJECTION
Status: DISCONTINUED | OUTPATIENT
Start: 2022-05-05 | End: 2022-05-05 | Stop reason: HOSPADM

## 2022-05-05 RX ORDER — OXYCODONE HYDROCHLORIDE 5 MG/1
5 TABLET ORAL
Status: DISCONTINUED | OUTPATIENT
Start: 2022-05-05 | End: 2022-05-05 | Stop reason: HOSPADM

## 2022-05-05 RX ORDER — LIDOCAINE HYDROCHLORIDE 20 MG/ML
INJECTION INTRAVENOUS
Status: DISCONTINUED | OUTPATIENT
Start: 2022-05-05 | End: 2022-05-05

## 2022-05-05 RX ORDER — SODIUM CHLORIDE 9 MG/ML
INJECTION, SOLUTION INTRAVENOUS CONTINUOUS
Status: DISCONTINUED | OUTPATIENT
Start: 2022-05-05 | End: 2022-05-05 | Stop reason: HOSPADM

## 2022-05-05 RX ORDER — HYDROMORPHONE HYDROCHLORIDE 2 MG/ML
0.4 INJECTION, SOLUTION INTRAMUSCULAR; INTRAVENOUS; SUBCUTANEOUS EVERY 5 MIN PRN
Status: DISCONTINUED | OUTPATIENT
Start: 2022-05-05 | End: 2022-05-05 | Stop reason: HOSPADM

## 2022-05-05 RX ORDER — ACETAMINOPHEN 10 MG/ML
INJECTION, SOLUTION INTRAVENOUS
Status: DISCONTINUED | OUTPATIENT
Start: 2022-05-05 | End: 2022-05-05

## 2022-05-05 RX ORDER — PROCHLORPERAZINE EDISYLATE 5 MG/ML
5 INJECTION INTRAMUSCULAR; INTRAVENOUS EVERY 30 MIN PRN
Status: DISCONTINUED | OUTPATIENT
Start: 2022-05-05 | End: 2022-05-05 | Stop reason: HOSPADM

## 2022-05-05 RX ORDER — MUPIROCIN 20 MG/G
OINTMENT TOPICAL
Status: DISCONTINUED | OUTPATIENT
Start: 2022-05-05 | End: 2022-05-05 | Stop reason: HOSPADM

## 2022-05-05 RX ORDER — KETOROLAC TROMETHAMINE 30 MG/ML
INJECTION, SOLUTION INTRAMUSCULAR; INTRAVENOUS
Status: DISCONTINUED | OUTPATIENT
Start: 2022-05-05 | End: 2022-05-05

## 2022-05-05 RX ORDER — ONDANSETRON 2 MG/ML
INJECTION INTRAMUSCULAR; INTRAVENOUS
Status: DISCONTINUED | OUTPATIENT
Start: 2022-05-05 | End: 2022-05-05

## 2022-05-05 RX ORDER — HYDROMORPHONE HYDROCHLORIDE 2 MG/ML
INJECTION, SOLUTION INTRAMUSCULAR; INTRAVENOUS; SUBCUTANEOUS
Status: DISCONTINUED | OUTPATIENT
Start: 2022-05-05 | End: 2022-05-05

## 2022-05-05 RX ORDER — DEXAMETHASONE SODIUM PHOSPHATE 4 MG/ML
INJECTION, SOLUTION INTRA-ARTICULAR; INTRALESIONAL; INTRAMUSCULAR; INTRAVENOUS; SOFT TISSUE
Status: DISCONTINUED | OUTPATIENT
Start: 2022-05-05 | End: 2022-05-05

## 2022-05-05 RX ORDER — MIDAZOLAM HYDROCHLORIDE 1 MG/ML
INJECTION INTRAMUSCULAR; INTRAVENOUS
Status: DISCONTINUED | OUTPATIENT
Start: 2022-05-05 | End: 2022-05-05

## 2022-05-05 RX ORDER — MEPERIDINE HYDROCHLORIDE 25 MG/ML
12.5 INJECTION INTRAMUSCULAR; INTRAVENOUS; SUBCUTANEOUS ONCE AS NEEDED
Status: COMPLETED | OUTPATIENT
Start: 2022-05-05 | End: 2022-05-05

## 2022-05-05 RX ORDER — ROCURONIUM BROMIDE 10 MG/ML
INJECTION, SOLUTION INTRAVENOUS
Status: DISCONTINUED | OUTPATIENT
Start: 2022-05-05 | End: 2022-05-05

## 2022-05-05 RX ORDER — FENTANYL CITRATE 50 UG/ML
INJECTION, SOLUTION INTRAMUSCULAR; INTRAVENOUS
Status: DISCONTINUED | OUTPATIENT
Start: 2022-05-05 | End: 2022-05-05

## 2022-05-05 RX ORDER — PROPOFOL 10 MG/ML
VIAL (ML) INTRAVENOUS
Status: DISCONTINUED | OUTPATIENT
Start: 2022-05-05 | End: 2022-05-05

## 2022-05-05 RX ORDER — DEXMEDETOMIDINE HYDROCHLORIDE 100 UG/ML
INJECTION, SOLUTION INTRAVENOUS
Status: DISCONTINUED | OUTPATIENT
Start: 2022-05-05 | End: 2022-05-05

## 2022-05-05 RX ADMIN — SODIUM CHLORIDE, SODIUM LACTATE, POTASSIUM CHLORIDE, AND CALCIUM CHLORIDE: .6; .31; .03; .02 INJECTION, SOLUTION INTRAVENOUS at 11:05

## 2022-05-05 RX ADMIN — HYDROMORPHONE HYDROCHLORIDE 0.4 MG: 2 INJECTION INTRAMUSCULAR; INTRAVENOUS; SUBCUTANEOUS at 01:05

## 2022-05-05 RX ADMIN — OXYCODONE 5 MG: 5 TABLET ORAL at 02:05

## 2022-05-05 RX ADMIN — FENTANYL CITRATE 50 MCG: 50 INJECTION, SOLUTION INTRAMUSCULAR; INTRAVENOUS at 12:05

## 2022-05-05 RX ADMIN — LIDOCAINE HYDROCHLORIDE 40 MG: 20 INJECTION, SOLUTION INTRAVENOUS at 12:05

## 2022-05-05 RX ADMIN — HYDROMORPHONE HYDROCHLORIDE 0.6 MG: 2 INJECTION INTRAMUSCULAR; INTRAVENOUS; SUBCUTANEOUS at 01:05

## 2022-05-05 RX ADMIN — DEXMEDETOMIDINE HYDROCHLORIDE 4 MCG: 100 INJECTION, SOLUTION, CONCENTRATE INTRAVENOUS at 12:05

## 2022-05-05 RX ADMIN — DEXAMETHASONE SODIUM PHOSPHATE 4 MG: 4 INJECTION, SOLUTION INTRAMUSCULAR; INTRAVENOUS at 12:05

## 2022-05-05 RX ADMIN — ONDANSETRON HYDROCHLORIDE 4 MG: 2 INJECTION INTRAMUSCULAR; INTRAVENOUS at 12:05

## 2022-05-05 RX ADMIN — MEPERIDINE HYDROCHLORIDE 12.5 MG: 25 INJECTION INTRAMUSCULAR; INTRAVENOUS; SUBCUTANEOUS at 02:05

## 2022-05-05 RX ADMIN — ROCURONIUM BROMIDE 30 MG: 10 INJECTION, SOLUTION INTRAVENOUS at 12:05

## 2022-05-05 RX ADMIN — PROPOFOL 150 MG: 10 INJECTION, EMULSION INTRAVENOUS at 12:05

## 2022-05-05 RX ADMIN — MUPIROCIN: 20 OINTMENT TOPICAL at 11:05

## 2022-05-05 RX ADMIN — MIDAZOLAM HYDROCHLORIDE 2 MG: 1 INJECTION, SOLUTION INTRAMUSCULAR; INTRAVENOUS at 12:05

## 2022-05-05 RX ADMIN — KETOROLAC TROMETHAMINE 30 MG: 30 INJECTION, SOLUTION INTRAMUSCULAR; INTRAVENOUS at 01:05

## 2022-05-05 RX ADMIN — ACETAMINOPHEN 1000 MG: 10 INJECTION, SOLUTION INTRAVENOUS at 12:05

## 2022-05-05 NOTE — ANESTHESIA PROCEDURE NOTES
Intubation    Date/Time: 5/5/2022 12:24 PM  Performed by: Ely Gonzalez CRNA  Authorized by: Ely Gonzalez CRNA     Intubation:     Induction:  Intravenous    Intubated:  Postinduction    Mask Ventilation:  Easy mask    Attempts:  1    Attempted By:  CRNA    Method of Intubation:  Direct    Blade:  Bauman 3    Laryngeal View Grade: Grade I - full view of cords      Difficult Airway Encountered?: No      Complications:  None    Airway Device:  Oral endotracheal tube    Airway Device Size:  7.0    Style/Cuff Inflation:  Cuffed (inflated to minimal occlusive pressure)    Tube secured:  21    Secured at:  The teeth    Placement Verified By:  Capnometry    Complicating Factors:  None    Findings Post-Intubation:  BS equal bilateral and atraumatic/condition of teeth unchanged

## 2022-05-05 NOTE — ANESTHESIA POSTPROCEDURE EVALUATION
Anesthesia Post Evaluation    Patient: Julius Peguero    Procedure(s) Performed: Procedure(s) (LRB):  EXCISION, CYST, OVARY, LAPAROSCOPIC (N/A)    Final Anesthesia Type: general      Patient location during evaluation: PACU  Patient participation: Yes- Able to Participate  Level of consciousness: awake and alert  Post-procedure vital signs: reviewed and stable  Pain management: adequate  Airway patency: patent    PONV status at discharge: No PONV  Anesthetic complications: no      Cardiovascular status: blood pressure returned to baseline  Respiratory status: unassisted and room air  Hydration status: euvolemic  Follow-up not needed.          Vitals Value Taken Time   /87 05/05/22 1444   Temp 36.3 °C (97.4 °F) 05/05/22 1430   Pulse 58 05/05/22 1454   Resp 18 05/05/22 1430   SpO2 100 % 05/05/22 1454   Vitals shown include unvalidated device data.      Event Time   Out of Recovery 14:55:53         Pain/Gilda Score: Pain Rating Prior to Med Admin: 4 (5/5/2022  2:25 PM)  Pain Rating Post Med Admin: 4 (5/5/2022  2:26 PM)  Gilda Score: 10 (5/5/2022  2:26 PM)

## 2022-05-05 NOTE — INTERVAL H&P NOTE
The patient has been examined and the H&P has been reviewed:    I concur with the findings and no changes have occurred since H&P was written.    Anesthesia/Surgery risks, benefits and alternative options discussed and understood by patient/family.      Leyla Adan M.D.   OB/GYN  PGY-3      Active Hospital Problems    Diagnosis  POA    *Left ovarian cyst [N83.202]  Yes      Resolved Hospital Problems   No resolved problems to display.

## 2022-05-05 NOTE — DISCHARGE SUMMARY
[facility] - Surgery ([place])  Brief Operative Note    Surgery Date: 5/5/2022     Surgeon(s) and Role:     * Alysia Lazo MD - Primary     * Leyla Adan MD - Resident - Assisting     * Binta Freeman MD - Resident - Assisting      Pre-op Diagnosis:  Left ovarian cyst [N83.202]    Post-op Diagnosis:  Post-Op Diagnosis Codes:     * Left ovarian cyst [N83.202]    Procedure(s) (LRB):  EXCISION, CYST, OVARY, LAPAROSCOPIC (N/A)    Anesthesia: Choice    Operative Findings:   - Normal appearing external genitalia. Hulka manipulator placed without difficulty.   - Laparoscopically- normal appearing upper abdomen, normal appearing R fallopian tube and ovary, normal appearing uterus. Large left ovarian cyst present in the pelvis. Cystectomy performed without complications. Cyst placed in endocatch bag and ruptured to pull through the 5mm skin incision. Sent to pathology. Hemostatic at conclusion of case.     Estimated Blood Loss: 50cc         Specimens:   Specimen (24h ago, onward)             Start     Ordered    05/05/22 1323  Specimen to Pathology, Surgery Gynecology and Obstetrics  Once        Comments: 1. Left ovarian cyst   References:    Click here for ordering Quick Tip   Question Answer Comment   Procedure Type: Gynecology and Obstetrics    Specimen Class: Routine/Screening        05/05/22 1323                  Discharge Note    OUTCOME: Patient tolerated treatment/procedure well without complication and is now ready for discharge.    DISPOSITION: Home or Self Care    FINAL DIAGNOSIS:  Left ovarian cyst    FOLLOWUP: In clinic    DISCHARGE INSTRUCTIONS:    Discharge Procedure Orders   Diet Adult Regular     No driving until:   Order Comments: Able to safely slam on the breaks without pain and not taking narcotic pain medications     Pelvic Rest   Order Comments: Pelvic rest (nothing in the vagina) for 4-6 weeks.     Notify your health care provider if you experience any of the following:  temperature >100.4      Notify your health care provider if you experience any of the following:  persistent nausea and vomiting or diarrhea     Notify your health care provider if you experience any of the following:  severe uncontrolled pain     Notify your health care provider if you experience any of the following:  redness, tenderness, or signs of infection (pain, swelling, redness, odor or green/yellow discharge around incision site)     Notify your health care provider if you experience any of the following:  difficulty breathing or increased cough     Notify your health care provider if you experience any of the following:  severe persistent headache     Notify your health care provider if you experience any of the following:  worsening rash     Notify your health care provider if you experience any of the following:  persistent dizziness, light-headedness, or visual disturbances     Notify your health care provider if you experience any of the following:  increased confusion or weakness     Notify your health care provider if you experience any of the following:   Order Comments: Heavy vaginal bleeding saturating more than 1 pad per hour for at least 2 hours.     Activity as tolerated     Leyla Adan M.D.   OB/GYN  PGY-3

## 2022-05-05 NOTE — OP NOTE
OPERATIVE REPORT    DATE OF OPERATION: 05/05/2022    PREOPERATIVE DIAGNOSIS:   1. Left ovarian cyst    POSTOPERATIVE DIAGNOSIS:   1. S/p left ovarian cystectomy    OPERATION PERFORMED:   Left ovarian cystectomy    SURGEON:  Alysia Lazo MD     ASSISTANT: Binta Freeman MD - PGY1 &  Leyla Adan MD - PGY3    ANESTHESIA: General.     COMPLICATIONS: None.     EBL: 50 cc    IVF: see anesthesia note    UOP: see anesthesia note    FINDINGS:   1. Normal external genitalia  2. Normal cervical and vaginal mucosa  3. Large left ovarian cyst  4. Normal appearing uterus, right ovary and BL fallopian tubes  5. Cystectomy performed, cyst sent to pathology  6. Good hemostasis noted at conclusion of case    SPECIMENS:  1. Left ovarian cyst    PROCEDURE IN DETAIL:   The patient was taken to the operating room, where general anesthesia was administered and found to be adequate. She was then placed in dorsal lithotomy position using yellow fin stirrups, and prepped and draped in the usual sterile manner, both vaginally and abdominally. A surgical timeout was performed with patient's name, date of birth, allergies, and procedure to be performed verbalized. All OR staff were in agreement. No preoperative antibiotics were administered as none were indicated.    Speculum was then placed in the vagina, and the anterior lip of the cervix was grasped with a single tooth tenaculum. Then, a [employer] uterine manipulator was placed in through the cervix. The speculum and tenaculum were then removed. A forrest catheter was also placed.    Attention was then turned to the abdomen where perforating towel clamps were placed on either side of the umbilicus, and the anterior abdominal wall was tented upward. A Veress needle was introduced into the peritoneal cavity with placement confirmed via saline drop test. A pneumoperitoneum was then created with CO2 gas. Then, an incision was made below the umbilicus. A 5 mm trocar was introduced through the  infraumbilical incision site under direct visualization. This showed good entry to the peritoneal cavity. The abdominal cavity was thoroughly surveyed. No abnormalities noted. Large left ovarian cyst was noted. The uterus, right ovary and bilateral fallopian tubes were normal in appearance.     Then, an incision was made in the left lower quadrant, and 5 mm trocar was introduced under direct visualization. An incision was also made in the right lower quadrant, and 5 mm trocar was introduced, also under direct visualization. Good hemostasis was noted and there was no evidence of injury from abdominal entry. A grasper was placed through the left port and used to elevate the ovary. Monopolar cautery was used to gently open the ovary to the cyst wall. Cautery and blunt dissection were used to retract the cyst from the wall of the ovary. Blunt dissection was then used to separate the cyst from the ovary wall until the base of the cyst was encountered. The Ligasure was then used to remove the cyst from the base of the ovary. An endocatch bag was placed through the left port and the entire cyst was placed in the bag. The endocatch bag was pulled to the level of the skin and a hemostat was then used to drain the cyst in the endocatch bag. The endocatch bag was then removed through the left port site. The cyst was then sent to pathology. The base of the ovary was noted to be hemostatic.     The procedure was then completed by removing the right and left lateral trocars under direct visualization. The laparoscope was removed and the abdomen was deflated. Finally, the infraumbilical trocar sheath was removed. The three skin incisions were closed with 4-0 monocryl in a subcuticular fashion. All incisions were covered with Steri-Strips and small bandages.     All the instruments were removed from the cervix and vagina. Sponge, instrument, and needle counts were correct x2. The patient was awakened from anesthesia without  difficulty and was transferred to the recovery room in stable condition.    Binta Freeman MD  PGY-1 OB/GYN

## 2022-05-05 NOTE — PLAN OF CARE
Pt voided 200cc of urine post 200cc instillation of sterile water into bladder. Voiding trial passed.

## 2022-05-05 NOTE — PLAN OF CARE
Julius Peguero has met all discharge criteria from Phase II. Vital Signs are stable, ambulating  without difficulty. Discharge instructions given, patient verbalized understanding. Discharged from facility via wheelchair in stable condition.

## 2022-05-05 NOTE — TRANSFER OF CARE
"Anesthesia Transfer of Care Note    Patient: Julius Peguero    Procedure(s) Performed: Procedure(s) (LRB):  EXCISION, CYST, OVARY, LAPAROSCOPIC (N/A)    Patient location: PACU    Anesthesia Type: general    Transport from OR: Transported from OR on 2-3 L/min O2 by NC with adequate spontaneous ventilation    Post pain: adequate analgesia    Post assessment: no apparent anesthetic complications and tolerated procedure well    Post vital signs: stable    Level of consciousness: awake, alert and oriented    Nausea/Vomiting: no nausea/vomiting    Complications: none    Transfer of care protocol was followed      Last vitals:   Visit Vitals  BP (!) 107/59 (BP Location: Right arm, Patient Position: Lying)   Pulse (!) 53   Temp 36.9 °C (98.5 °F) (Oral)   Resp 16   Ht 5' 3" (1.6 m)   Wt 51.6 kg (113 lb 12.1 oz)   LMP 05/01/2022 (Exact Date)   SpO2 99%   Breastfeeding No   BMI 20.15 kg/m²     "

## 2022-05-05 NOTE — ANESTHESIA PREPROCEDURE EVALUATION
05/05/2022  Julius Peguero is a 21 y.o., female.      Pre-op Assessment    I have reviewed the Patient Summary Reports.     I have reviewed the Nursing Notes. I have reviewed the NPO Status.   I have reviewed the Medications.     Review of Systems  Anesthesia Hx:  No problems with previous Anesthesia    Social:  Non-Smoker    Cardiovascular:   Exercise tolerance: good    Pulmonary:  Pulmonary Normal    Hepatic/GI:  Hepatic/GI Normal    Endocrine:  Endocrine Normal        Physical Exam  General: Well nourished    Airway:  Mallampati: II   Mouth Opening: Normal  TM Distance: Normal  Tongue: Normal  Neck ROM: Normal ROM    Dental:  Intact        Anesthesia Plan  Type of Anesthesia, risks & benefits discussed:    Anesthesia Type: Gen ETT  Intra-op Monitoring Plan: Standard ASA Monitors  Post Op Pain Control Plan: multimodal analgesia  Induction:  IV  Airway Plan: Video  Informed Consent: Informed consent signed with the Patient and all parties understand the risks and agree with anesthesia plan.  All questions answered.   ASA Score: 2    Ready For Surgery From Anesthesia Perspective.     .

## 2022-05-05 NOTE — OR NURSING
Called patient's boyfriend per patient request. She wanted to know if he is here. He stated he is downstairs and to call him when she gets to the 4th floor and he will come up to see her. Let patient know and she verbalized understanding.

## 2022-05-06 VITALS
HEIGHT: 63 IN | WEIGHT: 113.75 LBS | BODY MASS INDEX: 20.16 KG/M2 | HEART RATE: 57 BPM | TEMPERATURE: 97 F | SYSTOLIC BLOOD PRESSURE: 128 MMHG | DIASTOLIC BLOOD PRESSURE: 75 MMHG | OXYGEN SATURATION: 100 % | RESPIRATION RATE: 18 BRPM

## 2022-05-07 ENCOUNTER — TELEPHONE (OUTPATIENT)
Dept: OBSTETRICS AND GYNECOLOGY | Facility: HOSPITAL | Age: 22
End: 2022-05-07
Payer: MEDICAID

## 2022-05-07 NOTE — TELEPHONE ENCOUNTER
Called to check on patient post op. No N/V/F/C. Pain controlled with oral pain medications. Nanette PO. Follow up appt made on 5/25. Questions answered.    Binta Freeman MD  PGY-1 OB/GYN

## 2022-05-09 ENCOUNTER — HOSPITAL ENCOUNTER (EMERGENCY)
Facility: OTHER | Age: 22
Discharge: HOME | End: 2022-05-09
Attending: EMERGENCY MEDICINE
Payer: MEDICAID

## 2022-05-09 VITALS
BODY MASS INDEX: 19.84 KG/M2 | TEMPERATURE: 98 F | SYSTOLIC BLOOD PRESSURE: 100 MMHG | WEIGHT: 112 LBS | DIASTOLIC BLOOD PRESSURE: 72 MMHG | HEIGHT: 63 IN | RESPIRATION RATE: 16 BRPM | OXYGEN SATURATION: 100 % | HEART RATE: 75 BPM

## 2022-05-09 DIAGNOSIS — R07.9 CHEST PAIN: ICD-10-CM

## 2022-05-09 DIAGNOSIS — R55 SYNCOPE: Primary | ICD-10-CM

## 2022-05-09 LAB
ALBUMIN SERPL BCP-MCNC: 4.5 G/DL (ref 3.5–5.2)
ALP SERPL-CCNC: 48 U/L (ref 55–135)
ALT SERPL W/O P-5'-P-CCNC: 18 U/L (ref 10–44)
ANION GAP SERPL CALC-SCNC: 12 MMOL/L (ref 8–16)
AST SERPL-CCNC: 18 U/L (ref 10–40)
B-HCG UR QL: NEGATIVE
BASOPHILS # BLD AUTO: 0.02 K/UL (ref 0–0.2)
BASOPHILS NFR BLD: 0.3 % (ref 0–1.9)
BILIRUB SERPL-MCNC: 0.5 MG/DL (ref 0.1–1)
BILIRUB UR QL STRIP: NEGATIVE
BUN SERPL-MCNC: 8 MG/DL (ref 6–20)
CALCIUM SERPL-MCNC: 9.5 MG/DL (ref 8.7–10.5)
CHLORIDE SERPL-SCNC: 106 MMOL/L (ref 95–110)
CLARITY UR: CLEAR
CO2 SERPL-SCNC: 24 MMOL/L (ref 23–29)
COLOR UR: YELLOW
CREAT SERPL-MCNC: 0.7 MG/DL (ref 0.5–1.4)
CTP QC/QA: YES
DIFFERENTIAL METHOD: ABNORMAL
EOSINOPHIL # BLD AUTO: 0 K/UL (ref 0–0.5)
EOSINOPHIL NFR BLD: 0.2 % (ref 0–8)
ERYTHROCYTE [DISTWIDTH] IN BLOOD BY AUTOMATED COUNT: 12.2 % (ref 11.5–14.5)
EST. GFR  (AFRICAN AMERICAN): >60 ML/MIN/1.73 M^2
EST. GFR  (NON AFRICAN AMERICAN): >60 ML/MIN/1.73 M^2
GLUCOSE SERPL-MCNC: 72 MG/DL (ref 70–110)
GLUCOSE UR QL STRIP: NEGATIVE
HCT VFR BLD AUTO: 39.7 % (ref 37–48.5)
HCV AB SERPL QL IA: NEGATIVE
HGB BLD-MCNC: 13.5 G/DL (ref 12–16)
HGB UR QL STRIP: NEGATIVE
HIV 1+2 AB+HIV1 P24 AG SERPL QL IA: NEGATIVE
IMM GRANULOCYTES # BLD AUTO: 0.01 K/UL (ref 0–0.04)
IMM GRANULOCYTES NFR BLD AUTO: 0.2 % (ref 0–0.5)
KETONES UR QL STRIP: NEGATIVE
LEUKOCYTE ESTERASE UR QL STRIP: NEGATIVE
LYMPHOCYTES # BLD AUTO: 1.5 K/UL (ref 1–4.8)
LYMPHOCYTES NFR BLD: 23.9 % (ref 18–48)
MAGNESIUM SERPL-MCNC: 3.9 MG/DL (ref 1.6–2.6)
MCH RBC QN AUTO: 33.2 PG (ref 27–31)
MCHC RBC AUTO-ENTMCNC: 34 G/DL (ref 32–36)
MCV RBC AUTO: 98 FL (ref 82–98)
MONOCYTES # BLD AUTO: 0.4 K/UL (ref 0.3–1)
MONOCYTES NFR BLD: 6.3 % (ref 4–15)
NEUTROPHILS # BLD AUTO: 4.3 K/UL (ref 1.8–7.7)
NEUTROPHILS NFR BLD: 69.1 % (ref 38–73)
NITRITE UR QL STRIP: NEGATIVE
NRBC BLD-RTO: 0 /100 WBC
PH UR STRIP: 7 [PH] (ref 5–8)
PLATELET # BLD AUTO: 207 K/UL (ref 150–450)
PMV BLD AUTO: 10.3 FL (ref 9.2–12.9)
POTASSIUM SERPL-SCNC: 3.4 MMOL/L (ref 3.5–5.1)
PROT SERPL-MCNC: 7.5 G/DL (ref 6–8.4)
PROT UR QL STRIP: NEGATIVE
RBC # BLD AUTO: 4.07 M/UL (ref 4–5.4)
SODIUM SERPL-SCNC: 142 MMOL/L (ref 136–145)
SP GR UR STRIP: 1.02 (ref 1–1.03)
TROPONIN I SERPL DL<=0.01 NG/ML-MCNC: <0.006 NG/ML (ref 0–0.03)
URN SPEC COLLECT METH UR: NORMAL
UROBILINOGEN UR STRIP-ACNC: 1 EU/DL
WBC # BLD AUTO: 6.23 K/UL (ref 3.9–12.7)

## 2022-05-09 PROCEDURE — 80053 COMPREHEN METABOLIC PANEL: CPT | Performed by: EMERGENCY MEDICINE

## 2022-05-09 PROCEDURE — 93010 ELECTROCARDIOGRAM REPORT: CPT | Mod: ,,, | Performed by: INTERNAL MEDICINE

## 2022-05-09 PROCEDURE — 93005 ELECTROCARDIOGRAM TRACING: CPT

## 2022-05-09 PROCEDURE — 83735 ASSAY OF MAGNESIUM: CPT | Performed by: EMERGENCY MEDICINE

## 2022-05-09 PROCEDURE — 96361 HYDRATE IV INFUSION ADD-ON: CPT

## 2022-05-09 PROCEDURE — 25000003 PHARM REV CODE 250: Performed by: EMERGENCY MEDICINE

## 2022-05-09 PROCEDURE — 93010 EKG 12-LEAD: ICD-10-PCS | Mod: ,,, | Performed by: INTERNAL MEDICINE

## 2022-05-09 PROCEDURE — 85025 COMPLETE CBC W/AUTO DIFF WBC: CPT | Performed by: EMERGENCY MEDICINE

## 2022-05-09 PROCEDURE — 87389 HIV-1 AG W/HIV-1&-2 AB AG IA: CPT | Performed by: EMERGENCY MEDICINE

## 2022-05-09 PROCEDURE — 96374 THER/PROPH/DIAG INJ IV PUSH: CPT

## 2022-05-09 PROCEDURE — 84484 ASSAY OF TROPONIN QUANT: CPT | Performed by: EMERGENCY MEDICINE

## 2022-05-09 PROCEDURE — 99285 EMERGENCY DEPT VISIT HI MDM: CPT | Mod: 25

## 2022-05-09 PROCEDURE — 81025 URINE PREGNANCY TEST: CPT | Performed by: EMERGENCY MEDICINE

## 2022-05-09 PROCEDURE — 86803 HEPATITIS C AB TEST: CPT | Performed by: EMERGENCY MEDICINE

## 2022-05-09 PROCEDURE — 81003 URINALYSIS AUTO W/O SCOPE: CPT | Performed by: EMERGENCY MEDICINE

## 2022-05-09 PROCEDURE — 63600175 PHARM REV CODE 636 W HCPCS: Performed by: EMERGENCY MEDICINE

## 2022-05-09 RX ORDER — KETOROLAC TROMETHAMINE 30 MG/ML
15 INJECTION, SOLUTION INTRAMUSCULAR; INTRAVENOUS
Status: COMPLETED | OUTPATIENT
Start: 2022-05-09 | End: 2022-05-09

## 2022-05-09 RX ADMIN — SODIUM CHLORIDE 1000 ML: 0.9 INJECTION, SOLUTION INTRAVENOUS at 09:05

## 2022-05-09 RX ADMIN — KETOROLAC TROMETHAMINE 15 MG: 30 INJECTION, SOLUTION INTRAMUSCULAR at 10:05
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
## 2022-05-10 NOTE — ED TRIAGE NOTES
"Pt presents to the ED c/o anxiety. Pt reports grieving with family when she suddenly became weak and experienced a syncopal episode. Reports "blacking out" and hitting the ground. Denies hitting head. Reports generalized weakness. Reports chest tightness. Denies SOB, fever, headache, n/v/d. AAOx4  "

## 2022-05-12 LAB
FINAL PATHOLOGIC DIAGNOSIS: NORMAL
GROSS: NORMAL
Lab: NORMAL

## 2022-05-25 ENCOUNTER — OFFICE VISIT (OUTPATIENT)
Dept: OBSTETRICS AND GYNECOLOGY | Facility: CLINIC | Age: 22
End: 2022-05-25
Payer: MEDICAID

## 2022-05-25 VITALS
BODY MASS INDEX: 21.09 KG/M2 | DIASTOLIC BLOOD PRESSURE: 62 MMHG | WEIGHT: 119.06 LBS | HEIGHT: 63 IN | SYSTOLIC BLOOD PRESSURE: 98 MMHG

## 2022-05-25 DIAGNOSIS — N89.8 VAGINAL DISCHARGE: ICD-10-CM

## 2022-05-25 DIAGNOSIS — Z98.890 S/P LAPAROSCOPY: Primary | ICD-10-CM

## 2022-05-25 DIAGNOSIS — Z98.890 POST-OPERATIVE STATE: ICD-10-CM

## 2022-05-25 PROCEDURE — 1160F PR REVIEW ALL MEDS BY PRESCRIBER/CLIN PHARMACIST DOCUMENTED: ICD-10-PCS | Mod: CPTII,,,

## 2022-05-25 PROCEDURE — 3074F PR MOST RECENT SYSTOLIC BLOOD PRESSURE < 130 MM HG: ICD-10-PCS | Mod: CPTII,,,

## 2022-05-25 PROCEDURE — 99024 PR POST-OP FOLLOW-UP VISIT: ICD-10-PCS | Mod: ,,,

## 2022-05-25 PROCEDURE — 87591 N.GONORRHOEAE DNA AMP PROB: CPT

## 2022-05-25 PROCEDURE — 99999 PR PBB SHADOW E&M-EST. PATIENT-LVL II: CPT | Mod: PBBFAC,,,

## 2022-05-25 PROCEDURE — 3078F DIAST BP <80 MM HG: CPT | Mod: CPTII,,,

## 2022-05-25 PROCEDURE — 3008F PR BODY MASS INDEX (BMI) DOCUMENTED: ICD-10-PCS | Mod: CPTII,,,

## 2022-05-25 PROCEDURE — 87481 CANDIDA DNA AMP PROBE: CPT | Mod: 59

## 2022-05-25 PROCEDURE — 99212 OFFICE O/P EST SF 10 MIN: CPT | Mod: PBBFAC,PN

## 2022-05-25 PROCEDURE — 99024 POSTOP FOLLOW-UP VISIT: CPT | Mod: ,,,

## 2022-05-25 PROCEDURE — 1159F PR MEDICATION LIST DOCUMENTED IN MEDICAL RECORD: ICD-10-PCS | Mod: CPTII,,,

## 2022-05-25 PROCEDURE — 99999 PR PBB SHADOW E&M-EST. PATIENT-LVL II: ICD-10-PCS | Mod: PBBFAC,,,

## 2022-05-25 PROCEDURE — 3078F PR MOST RECENT DIASTOLIC BLOOD PRESSURE < 80 MM HG: ICD-10-PCS | Mod: CPTII,,,

## 2022-05-25 PROCEDURE — 1159F MED LIST DOCD IN RCRD: CPT | Mod: CPTII,,,

## 2022-05-25 PROCEDURE — 3008F BODY MASS INDEX DOCD: CPT | Mod: CPTII,,,

## 2022-05-25 PROCEDURE — 1160F RVW MEDS BY RX/DR IN RCRD: CPT | Mod: CPTII,,,

## 2022-05-25 PROCEDURE — 3074F SYST BP LT 130 MM HG: CPT | Mod: CPTII,,,

## 2022-05-25 PROCEDURE — 87491 CHLMYD TRACH DNA AMP PROBE: CPT | Mod: 59

## 2022-05-25 PROCEDURE — 87801 DETECT AGNT MULT DNA AMPLI: CPT

## 2022-05-25 NOTE — PROGRESS NOTES
GYN Post op visit  5/25/2022      CC: Post op    HPI: Patient presents today for post op visit. She underwent a left ovarian cystectomy on 5/5/22 for pelvic pain. She is now postoperative day #20 and doing well.   Vaginal discharge    She is feeling well. Denies bleeding. Denies fevers, chills. Eating well. Ambulating without difficulty. Has not had intercourse. Voiding and having bowel movements without any issues. She denies N/V (nausea/vomiting), CP (chest pain), SOB (shortness of breath).  Reports increased vaginal discharge, that she describes as yellowish white in color.    O:    Gen: NAD (no acute distress)  CV: RR(regular rate)  Abd: Soft, NT/ND (nontender/nondistended). Incisions c/d/i (clean/dry/intact).   Ext: warm and well perfused, no lower extremity edema  : no labial lesions/tenderness, no CMT, no blood in vaginal vault, normal appearing vaginal discharge in vaginal vault/white    Pathology:  Benign     A/P: 22 yo s/p Left ovarian cystectomy on 5/5/22. She is now postoperative day 20 and doing well.     #Post op: Discussed her pathology in detail today.   -Otherwise meeting post op milestones.  -No lifting >15 pounds for 6 weeks  -No intercourse for 4 more weeks    -GC/CT, affirm collected for vaginal discharge    Selena Fuller MD  OBGYN PGY-1

## 2022-05-26 LAB
BACTERIAL VAGINOSIS DNA: POSITIVE
CANDIDA GLABRATA DNA: NEGATIVE
CANDIDA KRUSEI DNA: NEGATIVE
CANDIDA RRNA VAG QL PROBE: NEGATIVE
T VAGINALIS RRNA GENITAL QL PROBE: NEGATIVE

## 2022-05-27 DIAGNOSIS — N76.0 ACUTE VAGINITIS: Primary | ICD-10-CM

## 2022-05-27 LAB
C TRACH DNA SPEC QL NAA+PROBE: NOT DETECTED
N GONORRHOEA DNA SPEC QL NAA+PROBE: NOT DETECTED

## 2022-05-27 RX ORDER — METRONIDAZOLE 500 MG/1
500 TABLET ORAL EVERY 12 HOURS
Qty: 14 TABLET | Refills: 0 | Status: SHIPPED | OUTPATIENT
Start: 2022-05-27 | End: 2022-06-03

## 2022-06-21 ENCOUNTER — TELEPHONE (OUTPATIENT)
Dept: OBSTETRICS AND GYNECOLOGY | Facility: HOSPITAL | Age: 22
End: 2022-06-21
Payer: MEDICAID

## 2022-06-21 NOTE — TELEPHONE ENCOUNTER
Called pt to discuss positive BV results and prescription of the antibiotic for treatment, as she has not [facility].   Left message on pt's mobile phone and the home phone listed on [facility] went straight to voicemail.     Left message regarding the need to discuss results and prescriptions. Encouraged pt to return the call to [facility] and ask to speak with the GYN team.     Selena Fuller MD  OBGYN PGY-1

## 2022-10-11 ENCOUNTER — PROCEDURE VISIT (OUTPATIENT)
Dept: OBSTETRICS AND GYNECOLOGY | Facility: CLINIC | Age: 22
End: 2022-10-11
Payer: MEDICAID

## 2022-10-11 ENCOUNTER — OFFICE VISIT (OUTPATIENT)
Dept: OBSTETRICS AND GYNECOLOGY | Facility: CLINIC | Age: 22
End: 2022-10-11
Payer: MEDICAID

## 2022-10-11 VITALS
HEART RATE: 63 BPM | HEIGHT: 63 IN | WEIGHT: 120.56 LBS | SYSTOLIC BLOOD PRESSURE: 102 MMHG | BODY MASS INDEX: 21.36 KG/M2 | DIASTOLIC BLOOD PRESSURE: 54 MMHG

## 2022-10-11 DIAGNOSIS — Z12.4 CERVICAL CANCER SCREENING: Primary | ICD-10-CM

## 2022-10-11 DIAGNOSIS — Z11.3 SCREENING FOR STD (SEXUALLY TRANSMITTED DISEASE): ICD-10-CM

## 2022-10-11 DIAGNOSIS — Z01.419 WELL WOMAN EXAM: ICD-10-CM

## 2022-10-11 LAB
HAV IGM SERPL QL IA: NORMAL
HBV CORE IGM SERPL QL IA: NORMAL
HBV SURFACE AG SERPL QL IA: NORMAL
HCV AB SERPL QL IA: NORMAL
HIV 1+2 AB+HIV1 P24 AG SERPL QL IA: NORMAL

## 2022-10-11 PROCEDURE — 3008F BODY MASS INDEX DOCD: CPT | Mod: CPTII,,, | Performed by: OBSTETRICS & GYNECOLOGY

## 2022-10-11 PROCEDURE — 86592 SYPHILIS TEST NON-TREP QUAL: CPT | Performed by: OBSTETRICS & GYNECOLOGY

## 2022-10-11 PROCEDURE — 1159F PR MEDICATION LIST DOCUMENTED IN MEDICAL RECORD: ICD-10-PCS | Mod: CPTII,,, | Performed by: OBSTETRICS & GYNECOLOGY

## 2022-10-11 PROCEDURE — 99395 PR PREVENTIVE VISIT,EST,18-39: ICD-10-PCS | Mod: S$PBB,,, | Performed by: OBSTETRICS & GYNECOLOGY

## 2022-10-11 PROCEDURE — 87591 N.GONORRHOEAE DNA AMP PROB: CPT | Performed by: OBSTETRICS & GYNECOLOGY

## 2022-10-11 PROCEDURE — 80074 ACUTE HEPATITIS PANEL: CPT | Performed by: OBSTETRICS & GYNECOLOGY

## 2022-10-11 PROCEDURE — 99999 PR PBB SHADOW E&M-EST. PATIENT-LVL III: CPT | Mod: PBBFAC,,, | Performed by: OBSTETRICS & GYNECOLOGY

## 2022-10-11 PROCEDURE — 3074F PR MOST RECENT SYSTOLIC BLOOD PRESSURE < 130 MM HG: ICD-10-PCS | Mod: CPTII,,, | Performed by: OBSTETRICS & GYNECOLOGY

## 2022-10-11 PROCEDURE — 87491 CHLMYD TRACH DNA AMP PROBE: CPT | Performed by: OBSTETRICS & GYNECOLOGY

## 2022-10-11 PROCEDURE — 87389 HIV-1 AG W/HIV-1&-2 AB AG IA: CPT | Performed by: OBSTETRICS & GYNECOLOGY

## 2022-10-11 PROCEDURE — 99213 OFFICE O/P EST LOW 20 MIN: CPT | Mod: PBBFAC,PN | Performed by: OBSTETRICS & GYNECOLOGY

## 2022-10-11 PROCEDURE — 99999 PR PBB SHADOW E&M-EST. PATIENT-LVL III: ICD-10-PCS | Mod: PBBFAC,,, | Performed by: OBSTETRICS & GYNECOLOGY

## 2022-10-11 PROCEDURE — 88175 CYTOPATH C/V AUTO FLUID REDO: CPT | Performed by: OBSTETRICS & GYNECOLOGY

## 2022-10-11 PROCEDURE — 3078F DIAST BP <80 MM HG: CPT | Mod: CPTII,,, | Performed by: OBSTETRICS & GYNECOLOGY

## 2022-10-11 PROCEDURE — 1159F MED LIST DOCD IN RCRD: CPT | Mod: CPTII,,, | Performed by: OBSTETRICS & GYNECOLOGY

## 2022-10-11 PROCEDURE — 3078F PR MOST RECENT DIASTOLIC BLOOD PRESSURE < 80 MM HG: ICD-10-PCS | Mod: CPTII,,, | Performed by: OBSTETRICS & GYNECOLOGY

## 2022-10-11 PROCEDURE — 3074F SYST BP LT 130 MM HG: CPT | Mod: CPTII,,, | Performed by: OBSTETRICS & GYNECOLOGY

## 2022-10-11 PROCEDURE — 3008F PR BODY MASS INDEX (BMI) DOCUMENTED: ICD-10-PCS | Mod: CPTII,,, | Performed by: OBSTETRICS & GYNECOLOGY

## 2022-10-11 PROCEDURE — 99395 PREV VISIT EST AGE 18-39: CPT | Mod: S$PBB,,, | Performed by: OBSTETRICS & GYNECOLOGY

## 2022-10-11 NOTE — PROGRESS NOTES
"Past medical, surgical, social, family, and obstetric histories; medications; prior records and results; and available outside records were reviewed and updated in the EMR.  Pertinent findings were noted below.    Reason for Visit   Well Woman    HPI   22 y.o. female  works at [employer], from [facility] patient: No    Menopausal: No    History of abnormal paps: N/A  Abnormal or postmenopausal bleeding: DENIES  History of abnormal mammograms:N/A   Family history of breast or ovarian cancer: DENIES  Any breast masses, pain, skin changes, or nipple discharge: DENIES  Possible recent STD exposure: Yes, new partner in last 6 months  Contraception:  condoms    Pap: No result found, Done today  Mammogram: N/A  Allergies: Patient has no known allergies.    Review of Systems   Constitutional:  Negative for chills and fever.   Eyes:  Negative for visual disturbance.   Respiratory:  Negative for cough and shortness of breath.    Cardiovascular:  Negative for chest pain and leg swelling.   Gastrointestinal:  Negative for abdominal pain, nausea and vomiting.   Genitourinary:  Positive for vaginal bleeding (on menstrual cycle). Negative for dysuria, flank pain, frequency and urgency.   Integumentary:  Negative for breast mass.   Neurological:  Negative for syncope and headaches.   Psychiatric/Behavioral:  Negative for depression. The patient is not nervous/anxious.    All other systems reviewed and are negative.  Breast: Negative for mass and mastodynia    Exam   BP (!) 102/54   Pulse 63   Ht 5' 3" (1.6 m)   Wt 54.7 kg (120 lb 9.5 oz)   LMP 10/09/2022 (Exact Date)   BMI 21.36 kg/m²     Physical Exam  Constitutional:       General: She is not in acute distress.     Appearance: She is not toxic-appearing.   Genitourinary:      Vulva normal.      Right Labia: No rash, lesions or Bartholin's cyst.     Left Labia: No lesions, Bartholin's cyst or rash.     Vaginal bleeding present.      No vaginal discharge.        " Right Adnexa: not tender and not full.     Left Adnexa: not tender and not full.     No cervical motion tenderness, discharge or lesion.      Uterus is not enlarged or tender.      Pelvic exam was performed with patient in the lithotomy position.   Breasts:     Breasts are symmetrical.      Right: No mass, nipple discharge, skin change or tenderness.      Left: No mass, nipple discharge, skin change or tenderness.   Pulmonary:      Effort: No respiratory distress.   Abdominal:      General: There is no distension.      Palpations: Abdomen is soft. There is no mass.      Tenderness: There is no abdominal tenderness. There is no guarding or rebound.   Lymphadenopathy:      Upper Body:      Right upper body: No axillary adenopathy.      Left upper body: No axillary adenopathy.   Exam conducted with a chaperone present.     Assessment and Plan   Cervical cancer screening  -     Liquid-Based Pap Smear, Screening    Well woman exam    Screening for STD (sexually transmitted disease)  -     C. trachomatis/N. gonorrhoeae by AMP DNA  -     HIV 1/2 Ag/Ab (4th Gen); Future; Expected date: 10/11/2022  -     RPR; Future; Expected date: 10/11/2022  -     Hepatitis Panel, Acute; Future; Expected date: 10/11/2022      Annual exam  Breast and pelvic exam: performed today, WNL  Patient was counseled on ASCCP guidelines for cervical cytology screening  Cervical screening: first cytology screen done today  Patient was counseled on current recommendations for breast cancer screening  Mammogram screening: @ 39yo unless new risk factors warrant earlier screening  VitD/Ca supplementation recommendations based on age:  <50yoa - Ca 1000mg/day, VitD 600IU/day  51-70yoa - Ca 1200mg/day, VitD 600IU/day  >71yoa - Ca 1200mg/day, VitD 800IU/day  STD testing: desires, ordered  Contraception: happy with condoms, counseled on Plan B OTC. Patient to let us know if desires another method of contraception    She was counseled to follow up with her PCP  for other routine health maintenance

## 2022-10-11 NOTE — PROGRESS NOTES
Lab Documentation:    Order Type: Written Order placed in [facility]    Patient in for lab visit only per provider treatment plan.

## 2022-10-12 LAB
C TRACH DNA SPEC QL NAA+PROBE: NOT DETECTED
N GONORRHOEA DNA SPEC QL NAA+PROBE: NOT DETECTED
RPR SER QL: NORMAL

## 2022-10-18 LAB
FINAL PATHOLOGIC DIAGNOSIS: NORMAL
Lab: NORMAL

## 2022-11-21 ENCOUNTER — HOSPITAL ENCOUNTER (EMERGENCY)
Facility: OTHER | Age: 22
Discharge: HOME | End: 2022-11-21
Attending: EMERGENCY MEDICINE
Payer: MEDICAID

## 2022-11-21 VITALS
HEART RATE: 92 BPM | SYSTOLIC BLOOD PRESSURE: 117 MMHG | OXYGEN SATURATION: 98 % | WEIGHT: 119 LBS | HEIGHT: 63 IN | TEMPERATURE: 98 F | BODY MASS INDEX: 21.09 KG/M2 | DIASTOLIC BLOOD PRESSURE: 75 MMHG | RESPIRATION RATE: 17 BRPM

## 2022-11-21 DIAGNOSIS — N83.201 RIGHT OVARIAN CYST: ICD-10-CM

## 2022-11-21 DIAGNOSIS — R11.0 NAUSEA: Primary | ICD-10-CM

## 2022-11-21 DIAGNOSIS — R10.2 PELVIC PAIN: ICD-10-CM

## 2022-11-21 LAB
ALBUMIN SERPL BCP-MCNC: 4.2 G/DL (ref 3.5–5.2)
ALP SERPL-CCNC: 52 U/L (ref 55–135)
ALT SERPL W/O P-5'-P-CCNC: 13 U/L (ref 10–44)
ANION GAP SERPL CALC-SCNC: 6 MMOL/L (ref 8–16)
AST SERPL-CCNC: 14 U/L (ref 10–40)
B-HCG UR QL: NEGATIVE
BACTERIA GENITAL QL WET PREP: ABNORMAL
BASOPHILS # BLD AUTO: 0.04 K/UL (ref 0–0.2)
BASOPHILS NFR BLD: 0.6 % (ref 0–1.9)
BILIRUB SERPL-MCNC: 0.5 MG/DL (ref 0.1–1)
BILIRUB UR QL STRIP: NEGATIVE
BUN SERPL-MCNC: 3 MG/DL (ref 6–20)
C TRACH DNA SPEC QL NAA+PROBE: NOT DETECTED
CALCIUM SERPL-MCNC: 9.2 MG/DL (ref 8.7–10.5)
CHLORIDE SERPL-SCNC: 106 MMOL/L (ref 95–110)
CLARITY UR: CLEAR
CLUE CELLS VAG QL WET PREP: ABNORMAL
CO2 SERPL-SCNC: 27 MMOL/L (ref 23–29)
COLOR UR: YELLOW
CREAT SERPL-MCNC: 0.7 MG/DL (ref 0.5–1.4)
CTP QC/QA: YES
DIFFERENTIAL METHOD: ABNORMAL
EOSINOPHIL # BLD AUTO: 0 K/UL (ref 0–0.5)
EOSINOPHIL NFR BLD: 0.3 % (ref 0–8)
ERYTHROCYTE [DISTWIDTH] IN BLOOD BY AUTOMATED COUNT: 12.6 % (ref 11.5–14.5)
EST. GFR  (NO RACE VARIABLE): >60 ML/MIN/1.73 M^2
FILAMENT FUNGI VAG WET PREP-#/AREA: ABNORMAL
GLUCOSE SERPL-MCNC: 78 MG/DL (ref 70–110)
GLUCOSE UR QL STRIP: NEGATIVE
HCT VFR BLD AUTO: 40.4 % (ref 37–48.5)
HCV AB SERPL QL IA: NEGATIVE
HGB BLD-MCNC: 13.8 G/DL (ref 12–16)
HGB UR QL STRIP: NEGATIVE
HIV 1+2 AB+HIV1 P24 AG SERPL QL IA: NEGATIVE
IMM GRANULOCYTES # BLD AUTO: 0.02 K/UL (ref 0–0.04)
IMM GRANULOCYTES NFR BLD AUTO: 0.3 % (ref 0–0.5)
KETONES UR QL STRIP: NEGATIVE
LEUKOCYTE ESTERASE UR QL STRIP: NEGATIVE
LIPASE SERPL-CCNC: 8 U/L (ref 4–60)
LYMPHOCYTES # BLD AUTO: 2.1 K/UL (ref 1–4.8)
LYMPHOCYTES NFR BLD: 29.4 % (ref 18–48)
MCH RBC QN AUTO: 33.5 PG (ref 27–31)
MCHC RBC AUTO-ENTMCNC: 34.2 G/DL (ref 32–36)
MCV RBC AUTO: 98 FL (ref 82–98)
MONOCYTES # BLD AUTO: 0.4 K/UL (ref 0.3–1)
MONOCYTES NFR BLD: 5.7 % (ref 4–15)
N GONORRHOEA DNA SPEC QL NAA+PROBE: NOT DETECTED
NEUTROPHILS # BLD AUTO: 4.5 K/UL (ref 1.8–7.7)
NEUTROPHILS NFR BLD: 63.7 % (ref 38–73)
NITRITE UR QL STRIP: NEGATIVE
NRBC BLD-RTO: 0 /100 WBC
PH UR STRIP: 8.5 [PH] (ref 5–8)
PLATELET # BLD AUTO: 241 K/UL (ref 150–450)
PMV BLD AUTO: 10 FL (ref 9.2–12.9)
POTASSIUM SERPL-SCNC: 3.9 MMOL/L (ref 3.5–5.1)
PROT SERPL-MCNC: 7.2 G/DL (ref 6–8.4)
PROT UR QL STRIP: NEGATIVE
RBC # BLD AUTO: 4.12 M/UL (ref 4–5.4)
SODIUM SERPL-SCNC: 139 MMOL/L (ref 136–145)
SP GR UR STRIP: 1.01 (ref 1–1.03)
SPECIMEN SOURCE: ABNORMAL
T VAGINALIS GENITAL QL WET PREP: ABNORMAL
URN SPEC COLLECT METH UR: NORMAL
UROBILINOGEN UR STRIP-ACNC: NEGATIVE EU/DL
WBC # BLD AUTO: 7.07 K/UL (ref 3.9–12.7)
WBC #/AREA VAG WET PREP: ABNORMAL
YEAST GENITAL QL WET PREP: ABNORMAL

## 2022-11-21 PROCEDURE — 63600175 PHARM REV CODE 636 W HCPCS: Performed by: NURSE PRACTITIONER

## 2022-11-21 PROCEDURE — 87389 HIV-1 AG W/HIV-1&-2 AB AG IA: CPT | Performed by: NURSE PRACTITIONER

## 2022-11-21 PROCEDURE — 87591 N.GONORRHOEAE DNA AMP PROB: CPT | Performed by: NURSE PRACTITIONER

## 2022-11-21 PROCEDURE — 87491 CHLMYD TRACH DNA AMP PROBE: CPT | Performed by: NURSE PRACTITIONER

## 2022-11-21 PROCEDURE — 80053 COMPREHEN METABOLIC PANEL: CPT | Performed by: EMERGENCY MEDICINE

## 2022-11-21 PROCEDURE — 85025 COMPLETE CBC W/AUTO DIFF WBC: CPT | Performed by: EMERGENCY MEDICINE

## 2022-11-21 PROCEDURE — 81003 URINALYSIS AUTO W/O SCOPE: CPT | Performed by: NURSE PRACTITIONER

## 2022-11-21 PROCEDURE — 96372 THER/PROPH/DIAG INJ SC/IM: CPT | Performed by: NURSE PRACTITIONER

## 2022-11-21 PROCEDURE — 87210 SMEAR WET MOUNT SALINE/INK: CPT | Performed by: NURSE PRACTITIONER

## 2022-11-21 PROCEDURE — 86803 HEPATITIS C AB TEST: CPT | Performed by: NURSE PRACTITIONER

## 2022-11-21 PROCEDURE — 99284 EMERGENCY DEPT VISIT MOD MDM: CPT | Mod: 25

## 2022-11-21 PROCEDURE — 83690 ASSAY OF LIPASE: CPT | Performed by: EMERGENCY MEDICINE

## 2022-11-21 PROCEDURE — 81025 URINE PREGNANCY TEST: CPT | Performed by: NURSE PRACTITIONER

## 2022-11-21 RX ORDER — KETOROLAC TROMETHAMINE 10 MG/1
10 TABLET, FILM COATED ORAL EVERY 6 HOURS
Qty: 12 TABLET | Refills: 0 | Status: SHIPPED | OUTPATIENT
Start: 2022-11-21 | End: 2022-11-24

## 2022-11-21 RX ORDER — KETOROLAC TROMETHAMINE 30 MG/ML
10 INJECTION, SOLUTION INTRAMUSCULAR; INTRAVENOUS
Status: COMPLETED | OUTPATIENT
Start: 2022-11-21 | End: 2022-11-21

## 2022-11-21 RX ORDER — ONDANSETRON 4 MG/1
4 TABLET, ORALLY DISINTEGRATING ORAL EVERY 8 HOURS PRN
Qty: 20 TABLET | Refills: 0 | Status: SHIPPED | OUTPATIENT
Start: 2022-11-21

## 2022-11-21 RX ADMIN — KETOROLAC TROMETHAMINE 10 MG: 30 INJECTION, SOLUTION INTRAMUSCULAR; INTRAVENOUS at 03:11

## 2022-11-21 NOTE — ED TRIAGE NOTES
Chief Complaint   Patient presents with    Abdominal Pain    Nausea     Pt c/o Lower abdominal pain and nausea after eating since Thursday.     Pt presents to ED with c/o lower abd pain and nausea after eating since x4 days. AAOX4 and in no acute distress.

## 2022-11-21 NOTE — ED NOTES
APPEARANCE: Alert, oriented and in no acute distress.  HEENT: Speaks without hoarseness.  CARDIAC: Normal rate and rhythm.    PERIPHERAL VASCULAR: peripheral pulses present. Normal cap refill. No edema. Warm to touch.    RESPIRATORY:Normal rate and effort. Respirations are equal and unlabored no obvious signs of distress.  GASTRO: Lower abd pain, nausea after eating.  : voids spontaneously and without difficulty.   MUSC: Full ROM. No obvious deformity. Ambulatory with a steady gait  SKIN: Skin is warm and dry, without discoloration. Mucous membranes moist.  NEURO: Pt is awake, alert, aware of environment. No neurologic deficits noted.

## 2022-11-21 NOTE — FIRST PROVIDER EVALUATION
"Medical screening examination initiated.  I have conducted a focused provider triage encounter, findings are as follows:    Brief history of present illness:  lower abd cramping, dysuria since Thursday.  Reports worseafter eating.  No vomiting.  No back pain    Vitals:    11/21/22 1331   BP: 110/74   BP Location: Left arm   Patient Position: Sitting   Pulse: 62   Resp: 17   Temp: 98.8 °F (37.1 °C)   TempSrc: Oral   SpO2: 100%   Weight: 54 kg (119 lb)   Height: 5' 3" (1.6 m)       Pertinent physical exam:  well appearing    Brief workup plan:  urine preg, ua    Preliminary workup initiated; this workup will be continued and followed by the physician or advanced practice provider that is assigned to the patient when roomed.  "

## 2022-11-22 NOTE — ED PROVIDER NOTES
Source of History:  Patient    Chief complaint:  Abdominal Pain and Nausea (Pt c/o Lower abdominal pain and nausea after eating since Thursday.)      HPI:  Julius Peguero is a 22 y.o. female with past medical history of ovarian cyst with rupture presenting with lower abdominal pain and nausea since Thursday.  Pain is located in the lower pelvis and reports that it is worse on the right than the left but she does feel it bilaterally.  She reports that during peaks of pain and after eating she gets nauseous but has not had any vomiting.  She denies sick contacts.  No dysuria, diarrhea, vaginal bleeding or abnormal vaginal discharge.  She is sexually active and reports low suspicion for STDs.  She has not taken any medications for her symptoms.    This is the extent to the patients complaints today here in the emergency department.    ROS: As per HPI and below:  General: No fever.  No chills.  Eyes: No visual changes.  ENT: No sore throat. No ear pain  Head: No headache.    Chest: No shortness of breath.  Cardiovascular: No chest pain.  Abdomen: + abdominal pain.  + nausea -vomiting.  Genito-Urinary: No abnormal urination.  Neurologic: No focal weakness.  No numbness.  MSK: no back pain.  Integument: No rashes or lesions.  Hematologic: No easy bruising.  Endocrine: No excessive thirst or urination.    Review of patient's allergies indicates:  No Known Allergies    PMH:  As per HPI and below:  No past medical history on file.  Past Surgical History:   Procedure Laterality Date    LAPAROSCOPIC SURGICAL REMOVAL OF CYST OF OVARY N/A 5/5/2022    Procedure: EXCISION, CYST, OVARY, LAPAROSCOPIC;  Surgeon: Alysia Lazo MD;  Location: [facility];  Service: OB/GYN;  Laterality: N/A;    MOUTH SURGERY         Social History     Tobacco Use    Smoking status: Never    Smokeless tobacco: Never   Substance Use Topics    Alcohol use: No    Drug use: Yes     Types: Marijuana       Physical Exam:    /75 (BP Location: Left  "arm, Patient Position: Sitting)   Pulse 92   Temp 98 °F (36.7 °C) (Oral)   Resp 17   Ht 5' 3" (1.6 m)   Wt 54 kg (119 lb)   LMP 11/06/2022   SpO2 98%   Breastfeeding No   BMI 21.08 kg/m²   Nursing note and vital signs reviewed.  Appearance: No acute distress.  Eyes: No conjunctival injection.  ENT: Oropharynx clear.    Chest/ Respiratory: Clear to auscultation bilaterally.  Good air movement.  No wheezes.  No rhonchi. No rales. No accessory muscle use.  Cardiovascular: Regular rate and rhythm.  No murmurs. No gallops. No rubs.  Abdomen: Soft.  Not distended.  Mild tenderness in the lower pelvis.  No guarding.  No rebound. Non-peritoneal.  :  Exam done in the presence of eric Prather RN.  External genitalia without lesions mass or tenderness.  No CMT or adnexal tenderness on bimanual exam.  Speculum exam deferred as patient asymptomatic  Musculoskeletal: Good range of motion all joints.  No deformities.  Neck supple.  No meningismus.  Skin: No rashes seen.  Good turgor.  No abrasions.  No ecchymoses.  Neurologic: Motor intact.  Sensation intact.  Cerebellar intact.  Cranial nerves intact.  Mental Status:  Alert and oriented x 3.  Appropriate, conversant    Labs that have been ordered have been independently reviewed and interpreted by myself.        Labs Reviewed   VAGINAL SCREEN - Abnormal; Notable for the following components:       Result Value    WBC - Vaginal Screen Rare (*)     Bacteria - Vaginal Screen Rare (*)     All other components within normal limits    Narrative:     Release to patient->Immediate   CBC W/ AUTO DIFFERENTIAL - Abnormal; Notable for the following components:    MCH 33.5 (*)     All other components within normal limits    Narrative:     Release to patient->Immediate   COMPREHENSIVE METABOLIC PANEL - Abnormal; Notable for the following components:    BUN 3 (*)     Alkaline Phosphatase 52 (*)     Anion Gap 6 (*)     All other components within normal limits    Narrative:     " Release to patient->Immediate   C. TRACHOMATIS/N. GONORRHOEAE BY AMP DNA   URINALYSIS, REFLEX TO URINE CULTURE    Narrative:     Specimen Source->Urine   HIV 1 / 2 ANTIBODY    Narrative:     Release to patient->Immediate   HEPATITIS C ANTIBODY    Narrative:     Release to patient->Immediate   LIPASE    Narrative:     Release to patient->Immediate   POCT URINE PREGNANCY       Imaging Results              US Pelvis Comp with Transvag NON-OB (xpd (Final result)  Result time 11/21/22 17:57:44      Final result by Logan Gomez MD (11/21/22 17:57:44)                   Impression:      Small 2 cm right ovarian mild complex corpus luteum cyst or hemorrhagic cyst.  If symptomatic, future pelvic ultrasound follow-up may be obtained in 6 weeks to ensure resolution.      Electronically signed by: Logan Gomez MD  Date:    11/21/2022  Time:    17:57               Narrative:    EXAMINATION:  US PELVIS COMP WITH TRANSVAG NON-OB (XPD)    CLINICAL HISTORY:  pelvic pain;    TECHNIQUE:  Transabdominal sonography of the pelvis was performed, followed by transvaginal sonography to better evaluate the uterus and ovaries.    COMPARISON:  05/01/2022.    FINDINGS:  The uterus measures 7 x 3 x 4 cm. Uterine parenchyma is homogeneous without evidence for masses. The endometrial echo complex is normal in thickness and measures 8 mm.    The right ovary measures 4 x 2 x 3 cm. The left ovary measures 2 x 2 x 3 cm. Arterial and venous flow are preserved bilaterally.  Right ovarian mild complex 2 cm cyst is seen with surrounding increased vascularity.  No significant free fluid is seen.                                      Initial Impression/ Differential Dx:  Urgent evaluation of 22 y.o. female presenting with lower abdominal pain and nausea. Patient is afebrile, not toxic appearing and hemodynamically stable.  On exam patient has mild tenderness in the lower pelvis.  Given history and exam, I suspect ovarian cyst.  History does not sound  consistent with PID, however will plan for bimanual exam and will screen patient for STDs as she is in high risk age group.  Labs have resulted at time of initial assessment and are grossly unremarkable.  No evidence of UTI on UA.  No tenderness at McBurney's point and generally well-appearing.  Considered but do not suspect acute appendicitis.    Differential Diagnosis includes, but is not limited to:  AAA, aortic dissection, mesenteric ischemia, perforated viscous, MI/ACS, SBO/volvulus, incarcerated/strangulated hernia, intussusception, ileus, appendicitis, cholecystitis, cholangitis, diverticulitis, esophagitis, hepatitis, nephrolithiasis, pancreatitis, gastroenteritis, colitis, IBD/IBS, biliary colic, GERD, PUD, constipation, UTI/pyelonephritis,  disorder.      MDM:        ED Course as of 11/21/22 1958 Mon Nov 21, 2022   1630 Trichomonas: None [CU]   1630 Clue Cells, Wet Prep: None [CU]   1802 US Pelvis Comp with Transvag NON-OB (xpd  Small 2 cm right ovarian mild complex corpus luteum cyst or hemorrhagic cyst.  If symptomatic, future pelvic ultrasound follow-up may be obtained in 6 weeks to ensure resolution. [CU]   1815 Bedside to reassess patient and inform her of her imaging results.  She states that the Toradol improved her pain.  Will send patient home with a short course of oral Toradol and Zofran in case she gets nauseous again.  Instructed to follow-up with her OBGYN in 6 weeks.  Patient given strict return to ED precautions and discharged home in good condition. Patient educated on signs and symptoms to monitor for and when to return to ED. Patient verbalized understanding agrees with treatment plan. All questions and concerns addressed.      [CU]      ED Course User Index  [CU] Andrew Wood NP                   Diagnostic Impression:    1. Nausea    2. Pelvic pain    3. Right ovarian cyst         ED Disposition Condition    Discharge Good            ED Prescriptions       Medication Sig  Dispense Start Date End Date Auth. Provider    ketorolac (TORADOL) 10 mg tablet Take 1 tablet (10 mg total) by mouth every 6 (six) hours. for 3 days 12 tablet 11/21/2022 11/24/2022 Andrew Wood NP    ondansetron (ZOFRAN-ODT) 4 MG TbDL Take 1 tablet (4 mg total) by mouth every 8 (eight) hours as needed (nausea). 20 tablet 11/21/2022 -- Andrew Wood NP          Follow-up Information       Follow up With Specialties Details Why Contact Info    your OBGYN                 Andrew Wood NP  11/21/22 1958

## 2022-12-15 ENCOUNTER — OFFICE VISIT (OUTPATIENT)
Dept: OBSTETRICS AND GYNECOLOGY | Facility: CLINIC | Age: 22
End: 2022-12-15
Payer: MEDICAID

## 2022-12-15 VITALS
WEIGHT: 117.5 LBS | SYSTOLIC BLOOD PRESSURE: 94 MMHG | HEIGHT: 63 IN | BODY MASS INDEX: 20.82 KG/M2 | DIASTOLIC BLOOD PRESSURE: 62 MMHG

## 2022-12-15 DIAGNOSIS — N83.201 RIGHT OVARIAN CYST: Primary | ICD-10-CM

## 2022-12-15 PROCEDURE — 99999 PR PBB SHADOW E&M-EST. PATIENT-LVL II: ICD-10-PCS | Mod: PBBFAC,,, | Performed by: OBSTETRICS & GYNECOLOGY

## 2022-12-15 PROCEDURE — 3078F DIAST BP <80 MM HG: CPT | Mod: CPTII,,, | Performed by: OBSTETRICS & GYNECOLOGY

## 2022-12-15 PROCEDURE — 99212 PR OFFICE/OUTPT VISIT, EST, LEVL II, 10-19 MIN: ICD-10-PCS | Mod: S$PBB,,, | Performed by: OBSTETRICS & GYNECOLOGY

## 2022-12-15 PROCEDURE — 3078F PR MOST RECENT DIASTOLIC BLOOD PRESSURE < 80 MM HG: ICD-10-PCS | Mod: CPTII,,, | Performed by: OBSTETRICS & GYNECOLOGY

## 2022-12-15 PROCEDURE — 3008F PR BODY MASS INDEX (BMI) DOCUMENTED: ICD-10-PCS | Mod: CPTII,,, | Performed by: OBSTETRICS & GYNECOLOGY

## 2022-12-15 PROCEDURE — 99212 OFFICE O/P EST SF 10 MIN: CPT | Mod: PBBFAC,PN | Performed by: OBSTETRICS & GYNECOLOGY

## 2022-12-15 PROCEDURE — 1159F MED LIST DOCD IN RCRD: CPT | Mod: CPTII,,, | Performed by: OBSTETRICS & GYNECOLOGY

## 2022-12-15 PROCEDURE — 3074F SYST BP LT 130 MM HG: CPT | Mod: CPTII,,, | Performed by: OBSTETRICS & GYNECOLOGY

## 2022-12-15 PROCEDURE — 1159F PR MEDICATION LIST DOCUMENTED IN MEDICAL RECORD: ICD-10-PCS | Mod: CPTII,,, | Performed by: OBSTETRICS & GYNECOLOGY

## 2022-12-15 PROCEDURE — 99999 PR PBB SHADOW E&M-EST. PATIENT-LVL II: CPT | Mod: PBBFAC,,, | Performed by: OBSTETRICS & GYNECOLOGY

## 2022-12-15 PROCEDURE — 99212 OFFICE O/P EST SF 10 MIN: CPT | Mod: S$PBB,,, | Performed by: OBSTETRICS & GYNECOLOGY

## 2022-12-15 PROCEDURE — 3008F BODY MASS INDEX DOCD: CPT | Mod: CPTII,,, | Performed by: OBSTETRICS & GYNECOLOGY

## 2022-12-15 PROCEDURE — 3074F PR MOST RECENT SYSTOLIC BLOOD PRESSURE < 130 MM HG: ICD-10-PCS | Mod: CPTII,,, | Performed by: OBSTETRICS & GYNECOLOGY

## 2022-12-15 NOTE — PROGRESS NOTES
"Past medical, surgical, social, family, and obstetric histories; medications; prior records and results; and available outside records were reviewed and updated in the EMR.  Pertinent findings were noted below.    Reason for Visit   hospital followup    HPI   22 y.o. female     Patient's last menstrual period was 2022.    Went to ER with pelvic pain - 2cm right ovarian cyst found on US. She has history of left ovarian cyst rupture and resolution. Pain resolved and she has no complaints today.    Contraception: None, not sexually active, declines  Pap: 10/18/2022, NILM  Mammogram: N/A    Exam   BP 94/62   Ht 5' 3" (1.6 m)   Wt 53.3 kg (117 lb 8.1 oz)   LMP 2022   BMI 20.82 kg/m²     Physical Exam  Constitutional:       General: She is not in acute distress.     Appearance: Normal appearance. She is normal weight.   HENT:      Head: Normocephalic and atraumatic.   Pulmonary:      Effort: Pulmonary effort is normal.   Musculoskeletal:         General: Normal range of motion.   Neurological:      General: No focal deficit present.      Mental Status: She is alert.   Psychiatric:         Mood and Affect: Mood normal.         Behavior: Behavior normal.         Thought Content: Thought content normal.         Judgment: Judgment normal.   Vitals reviewed.     Assessment and Plan   Right ovarian cyst      Pain resolved. Discussed normal physiology of ovarian activity. No indication to repeat pelvic US. Questions answered.    RTC PRN or for annual WWE     "

## 2023-01-17 ENCOUNTER — HOSPITAL ENCOUNTER (EMERGENCY)
Facility: OTHER | Age: 23
Discharge: HOME | End: 2023-01-17
Attending: EMERGENCY MEDICINE
Payer: MEDICAID

## 2023-01-17 ENCOUNTER — PATIENT OUTREACH (OUTPATIENT)
Dept: EMERGENCY MEDICINE | Facility: OTHER | Age: 23
End: 2023-01-17
Payer: MEDICAID

## 2023-01-17 VITALS
HEIGHT: 64 IN | RESPIRATION RATE: 17 BRPM | SYSTOLIC BLOOD PRESSURE: 93 MMHG | OXYGEN SATURATION: 97 % | HEART RATE: 62 BPM | TEMPERATURE: 98 F | BODY MASS INDEX: 19.81 KG/M2 | WEIGHT: 116 LBS | DIASTOLIC BLOOD PRESSURE: 55 MMHG

## 2023-01-17 DIAGNOSIS — R10.32 BILATERAL LOWER ABDOMINAL CRAMPING: Primary | ICD-10-CM

## 2023-01-17 DIAGNOSIS — R10.31 BILATERAL LOWER ABDOMINAL CRAMPING: Primary | ICD-10-CM

## 2023-01-17 DIAGNOSIS — R11.2 NAUSEA & VOMITING: ICD-10-CM

## 2023-01-17 LAB
ALBUMIN SERPL BCP-MCNC: 4 G/DL (ref 3.5–5.2)
ALP SERPL-CCNC: 46 U/L (ref 55–135)
ALT SERPL W/O P-5'-P-CCNC: 11 U/L (ref 10–44)
ANION GAP SERPL CALC-SCNC: 11 MMOL/L (ref 8–16)
AST SERPL-CCNC: 13 U/L (ref 10–40)
B-HCG UR QL: NEGATIVE
BASOPHILS # BLD AUTO: 0.04 K/UL (ref 0–0.2)
BASOPHILS NFR BLD: 0.5 % (ref 0–1.9)
BILIRUB SERPL-MCNC: 0.4 MG/DL (ref 0.1–1)
BILIRUB UR QL STRIP: NEGATIVE
BUN SERPL-MCNC: 6 MG/DL (ref 6–20)
CALCIUM SERPL-MCNC: 9.3 MG/DL (ref 8.7–10.5)
CHLORIDE SERPL-SCNC: 106 MMOL/L (ref 95–110)
CLARITY UR: ABNORMAL
CO2 SERPL-SCNC: 21 MMOL/L (ref 23–29)
COLOR UR: YELLOW
CREAT SERPL-MCNC: 0.7 MG/DL (ref 0.5–1.4)
CTP QC/QA: YES
DIFFERENTIAL METHOD: ABNORMAL
EOSINOPHIL # BLD AUTO: 0 K/UL (ref 0–0.5)
EOSINOPHIL NFR BLD: 0.4 % (ref 0–8)
ERYTHROCYTE [DISTWIDTH] IN BLOOD BY AUTOMATED COUNT: 12.7 % (ref 11.5–14.5)
EST. GFR  (NO RACE VARIABLE): >60 ML/MIN/1.73 M^2
GLUCOSE SERPL-MCNC: 98 MG/DL (ref 70–110)
GLUCOSE UR QL STRIP: NEGATIVE
HCT VFR BLD AUTO: 36.3 % (ref 37–48.5)
HGB BLD-MCNC: 12.8 G/DL (ref 12–16)
HGB UR QL STRIP: NEGATIVE
IMM GRANULOCYTES # BLD AUTO: 0.03 K/UL (ref 0–0.04)
IMM GRANULOCYTES NFR BLD AUTO: 0.4 % (ref 0–0.5)
KETONES UR QL STRIP: ABNORMAL
LEUKOCYTE ESTERASE UR QL STRIP: NEGATIVE
LIPASE SERPL-CCNC: 12 U/L (ref 4–60)
LYMPHOCYTES # BLD AUTO: 2.6 K/UL (ref 1–4.8)
LYMPHOCYTES NFR BLD: 30.8 % (ref 18–48)
MCH RBC QN AUTO: 33.8 PG (ref 27–31)
MCHC RBC AUTO-ENTMCNC: 35.3 G/DL (ref 32–36)
MCV RBC AUTO: 96 FL (ref 82–98)
MONOCYTES # BLD AUTO: 0.6 K/UL (ref 0.3–1)
MONOCYTES NFR BLD: 6.7 % (ref 4–15)
NEUTROPHILS # BLD AUTO: 5.2 K/UL (ref 1.8–7.7)
NEUTROPHILS NFR BLD: 61.2 % (ref 38–73)
NITRITE UR QL STRIP: NEGATIVE
NRBC BLD-RTO: 0 /100 WBC
PH UR STRIP: >8 [PH] (ref 5–8)
PLATELET # BLD AUTO: 216 K/UL (ref 150–450)
PMV BLD AUTO: 10.4 FL (ref 9.2–12.9)
POC MOLECULAR INFLUENZA A AGN: NEGATIVE
POC MOLECULAR INFLUENZA B AGN: NEGATIVE
POTASSIUM SERPL-SCNC: 3.3 MMOL/L (ref 3.5–5.1)
PROT SERPL-MCNC: 6.8 G/DL (ref 6–8.4)
PROT UR QL STRIP: ABNORMAL
RBC # BLD AUTO: 3.79 M/UL (ref 4–5.4)
SARS-COV-2 RDRP RESP QL NAA+PROBE: NEGATIVE
SODIUM SERPL-SCNC: 138 MMOL/L (ref 136–145)
SP GR UR STRIP: 1.02 (ref 1–1.03)
URN SPEC COLLECT METH UR: ABNORMAL
UROBILINOGEN UR STRIP-ACNC: NEGATIVE EU/DL
WBC # BLD AUTO: 8.4 K/UL (ref 3.9–12.7)

## 2023-01-17 PROCEDURE — 96374 THER/PROPH/DIAG INJ IV PUSH: CPT

## 2023-01-17 PROCEDURE — 99284 EMERGENCY DEPT VISIT MOD MDM: CPT | Mod: 25

## 2023-01-17 PROCEDURE — 63600175 PHARM REV CODE 636 W HCPCS: Performed by: EMERGENCY MEDICINE

## 2023-01-17 PROCEDURE — 83690 ASSAY OF LIPASE: CPT | Performed by: EMERGENCY MEDICINE

## 2023-01-17 PROCEDURE — 93010 ELECTROCARDIOGRAM REPORT: CPT | Mod: ,,, | Performed by: INTERNAL MEDICINE

## 2023-01-17 PROCEDURE — 96375 TX/PRO/DX INJ NEW DRUG ADDON: CPT

## 2023-01-17 PROCEDURE — 93005 ELECTROCARDIOGRAM TRACING: CPT

## 2023-01-17 PROCEDURE — 81003 URINALYSIS AUTO W/O SCOPE: CPT | Performed by: EMERGENCY MEDICINE

## 2023-01-17 PROCEDURE — 80053 COMPREHEN METABOLIC PANEL: CPT | Performed by: EMERGENCY MEDICINE

## 2023-01-17 PROCEDURE — 87635 SARS-COV-2 COVID-19 AMP PRB: CPT | Performed by: EMERGENCY MEDICINE

## 2023-01-17 PROCEDURE — 81025 URINE PREGNANCY TEST: CPT | Performed by: EMERGENCY MEDICINE

## 2023-01-17 PROCEDURE — 85025 COMPLETE CBC W/AUTO DIFF WBC: CPT | Performed by: EMERGENCY MEDICINE

## 2023-01-17 PROCEDURE — 96361 HYDRATE IV INFUSION ADD-ON: CPT

## 2023-01-17 PROCEDURE — 25000003 PHARM REV CODE 250: Performed by: EMERGENCY MEDICINE

## 2023-01-17 PROCEDURE — 93010 EKG 12-LEAD: ICD-10-PCS | Mod: ,,, | Performed by: INTERNAL MEDICINE

## 2023-01-17 RX ORDER — SODIUM CHLORIDE 9 MG/ML
1000 INJECTION, SOLUTION INTRAVENOUS
Status: COMPLETED | OUTPATIENT
Start: 2023-01-17 | End: 2023-01-17

## 2023-01-17 RX ORDER — ONDANSETRON 2 MG/ML
4 INJECTION INTRAMUSCULAR; INTRAVENOUS
Status: COMPLETED | OUTPATIENT
Start: 2023-01-17 | End: 2023-01-17

## 2023-01-17 RX ORDER — DROPERIDOL 2.5 MG/ML
1.25 INJECTION, SOLUTION INTRAMUSCULAR; INTRAVENOUS
Status: COMPLETED | OUTPATIENT
Start: 2023-01-17 | End: 2023-01-17

## 2023-01-17 RX ORDER — ONDANSETRON 8 MG/1
8 TABLET, ORALLY DISINTEGRATING ORAL EVERY 6 HOURS PRN
Qty: 15 TABLET | Refills: 0 | Status: SHIPPED | OUTPATIENT
Start: 2023-01-17

## 2023-01-17 RX ORDER — FAMOTIDINE 10 MG/ML
20 INJECTION INTRAVENOUS
Status: COMPLETED | OUTPATIENT
Start: 2023-01-17 | End: 2023-01-17

## 2023-01-17 RX ADMIN — DROPERIDOL 1.25 MG: 2.5 INJECTION, SOLUTION INTRAMUSCULAR; INTRAVENOUS at 01:01

## 2023-01-17 RX ADMIN — ONDANSETRON 4 MG: 2 INJECTION INTRAMUSCULAR; INTRAVENOUS at 12:01

## 2023-01-17 RX ADMIN — SODIUM CHLORIDE 1000 ML: 0.9 INJECTION, SOLUTION INTRAVENOUS at 12:01

## 2023-01-17 RX ADMIN — FAMOTIDINE 20 MG: 10 INJECTION, SOLUTION INTRAVENOUS at 12:01

## 2023-01-17 NOTE — ED PROVIDER NOTES
Encounter Date: 1/17/2023    SCRIBE #1 NOTE: I, Jeyson Fernandez, am scribing for, and in the presence of,  Ira Villarreal MD. I have scribed the following portions of the note - Other sections scribed: HPI, ROS, PE.     History     Chief Complaint   Patient presents with    Abdominal Pain     UPPER EPIGASTRIC PAIN X1 DAY      Vomiting     Julius Peguero is a 22 y.o. female with a PSHx of laparoscopic surgical removal of cyst of ovary who presents to the ED for evaluation of nausea and vomiting beginning at approximately 10 PM this evening. Patient complains of associated chills, mid-abdominal pain and cramping, and loss of appetite. She reports that her last meal was at approximately 3-4 PM yesterday. She endorses worsening abdominal pain with activity. She says that initially she noticed abdominal pain followed by nausea and vomiting. She reports that her LMP was 01/01/2023. Denies any known sick contact. Denies any fever, sore throat, SOB, CP, diarrhea, dysuria, back pain, rash, weakness, or bruising easily. No other complaints.    The history is provided by the patient. No  was used.   Review of patient's allergies indicates:  No Known Allergies  No past medical history on file.  Past Surgical History:   Procedure Laterality Date    LAPAROSCOPIC SURGICAL REMOVAL OF CYST OF OVARY N/A 5/5/2022    Procedure: EXCISION, CYST, OVARY, LAPAROSCOPIC;  Surgeon: Alysia Lazo MD;  Location: [facility];  Service: OB/GYN;  Laterality: N/A;    MOUTH SURGERY       No family history on file.  Social History     Tobacco Use    Smoking status: Never    Smokeless tobacco: Never   Substance Use Topics    Alcohol use: No    Drug use: Yes     Types: Marijuana     Review of Systems   Constitutional:  Positive for appetite change (Reduced) and chills. Negative for fever.   HENT:  Negative for sore throat.    Respiratory:  Negative for shortness of breath.    Cardiovascular:  Negative for chest pain.    Gastrointestinal:  Positive for abdominal pain (Mid-abdominal), nausea and vomiting. Negative for diarrhea.   Genitourinary:  Negative for dysuria.   Musculoskeletal:  Negative for back pain.   Skin:  Negative for rash.   Neurological:  Negative for weakness.   Hematological:  Does not bruise/bleed easily.   All other systems reviewed and are negative.    Physical Exam     Initial Vitals [01/17/23 0011]   BP Pulse Resp Temp SpO2   (!) 122/59 61 17 97.7 °F (36.5 °C) 100 %      MAP       --         Physical Exam    Nursing note and vitals reviewed.  Constitutional: She appears well-developed and well-nourished.   HENT:   Head: Normocephalic and atraumatic.   Right Ear: External ear normal.   Left Ear: External ear normal.   Nose: Nose normal.   Mouth/Throat: Oropharynx is clear and moist.   Eyes: Conjunctivae and EOM are normal. Pupils are equal, round, and reactive to light.   Neck: Neck supple.   Normal range of motion.  Cardiovascular:  Normal rate and regular rhythm.     Exam reveals no gallop and no friction rub.       No murmur heard.  Pulmonary/Chest: Breath sounds normal. No respiratory distress. She has no wheezes. She has no rhonchi. She has no rales. She exhibits no tenderness.   Abdominal: Abdomen is soft. Bowel sounds are normal. She exhibits no mass. There is abdominal tenderness.   Diffuse abdominal tenderness with palpation. Normal bowel sounds. No rebound, guarding. There is no rebound and no guarding.   Musculoskeletal:         General: Normal range of motion.      Cervical back: Normal range of motion and neck supple.     Lymphadenopathy:     She has no cervical adenopathy.   Neurological: She is alert and oriented to person, place, and time. She has normal strength.   Skin: Skin is warm and dry. No rash noted. No pallor.   Psychiatric: She has a normal mood and affect. Thought content normal.       ED Course   Procedures  Labs Reviewed   URINALYSIS, REFLEX TO URINE CULTURE - Abnormal; Notable for  the following components:       Result Value    Appearance, UA Hazy (*)     pH, UA >8.0 (*)     Protein, UA Trace (*)     Ketones, UA 1+ (*)     All other components within normal limits    Narrative:     Specimen Source->Urine   CBC W/ AUTO DIFFERENTIAL - Abnormal; Notable for the following components:    RBC 3.79 (*)     Hematocrit 36.3 (*)     MCH 33.8 (*)     All other components within normal limits   COMPREHENSIVE METABOLIC PANEL - Abnormal; Notable for the following components:    Potassium 3.3 (*)     CO2 21 (*)     Alkaline Phosphatase 46 (*)     All other components within normal limits   POCT URINE PREGNANCY - Normal   LIPASE   SARS-COV-2 RDRP GENE   POCT INFLUENZA A/B MOLECULAR     EKG Readings: (Independently Interpreted)   Initial Reading: No STEMI.   No STEMI. Rate of 52 bpm. Sinus bradycardia. Normal intervals. No ST or T wave changes Rhythm.   Imaging Results    None          Medications   0.9%  NaCl infusion (0 mLs Intravenous Stopped 1/17/23 0131)   famotidine (PF) injection 20 mg (20 mg Intravenous Given 1/17/23 0044)   ondansetron injection 4 mg (4 mg Intravenous Given 1/17/23 0043)   droperidoL injection 1.25 mg (1.25 mg Intravenous Given 1/17/23 0135)     Medical Decision Making:   Initial Assessment:   22-year-old healthy female presents complaining of lower abdominal pain while at by vomiting that started approximately 2 hours ago.  She denies any other associated symptoms.  Patient does admit to using lot of marijuana earlier today.  Differential Diagnosis:   Includes COVID, flu, Gastritis, peptic ulcer disease, pancreatitis, colitis, UTI, PID, pregnancy, cannabinoid hyperemesis  Clinical Tests:   Lab Tests: Ordered and Reviewed  ED Management:  Lab work without significant abnormalities.  UPT negative.  UA negative for UTI.  Symptoms resolved after IV fluids, droperidol, and Pepcid.  Given reassuring workup, believe the patient's symptoms are likely secondary to cannabinoid hyperemesis.  I  did discuss this with the patient as well as encouraged her to reduce her usage of marijuana.  Patient was discharged home with a prescription for Zofran and counseled supportive care for home.        Scribe Attestation:   Scribe #1: I performed the above scribed service and the documentation accurately describes the services I performed. I attest to the accuracy of the note.            I, Ira Villarreal  , personally performed the services described in this documentation. All medical record entries made by the scribe were at my direction and in my presence. I have reviewed the chart and agree that the record reflects my personal performance and is accurate and complete.        Clinical Impression:   Final diagnoses:  [R11.2] Nausea & vomiting  [R10.31, R10.32] Bilateral lower abdominal cramping (Primary)        ED Disposition Condition    Discharge Stable          ED Prescriptions       Medication Sig Dispense Start Date End Date Auth. Provider    ondansetron (ZOFRAN-ODT) 8 MG TbDL Take 1 tablet (8 mg total) by mouth every 6 (six) hours as needed (nausea). 15 tablet 1/17/2023 -- Ira Villarreal MD          Follow-up Information       Follow up With Specialties Details Why Contact Info    primary care  Schedule an appointment as soon as possible for a visit  As needed              Ira Villarreal MD  01/17/23 4225

## 2023-01-17 NOTE — ED NOTES
Patient will go to [facility] to give the urine sample. Orders faxed. Appointment scheduled 12/23/2021. She states the pain is controlled, but if it becomes severe or develops n/v, or fever she will go to the ER. Please schedule ct if urine is negative. Thank you. Pt in the semi- peraza's position, A & O x 3, w/out complaint at this time, denies N/V. Pt denies SOB, respirations are even and unlabored. Skin is warm dry and pink. VSS. Will continue to monitor closely.

## 2023-01-17 NOTE — ED NOTES
Pt sitting up on stretcher, A & O x 3. No distress noted, denies N/V and abd pain. Respirations are even and unlabored, no nasal flaring and no use of accessory muscles. Skin is warm, dry and pink. VSS. Pt states she is ready to be discharged.

## 2023-01-17 NOTE — ED NOTES
Pt c/o diffuse abd pain w/ vomiting x 2 hrs. Pt states she smokes marijuana frequently and smoked a lot today. Pt is A & O x 3, denies SOB, fever, chills and N/V/D. No obvious respiratory distress noted. Respirations are even and unlabored. Skin is warm and dry w/ pink mucosa. VS. DEMARCUS x 3mm. BBS- CTA . Abd- + pain upon palpation to all quadrants. PSM x 4 exts. Bed is locked, in the low position and locked for safety. Call bell @ BS. Will continue to monitor closely.

## 2023-01-17 NOTE — ED NOTES
Pt is A & O x 3, appropriate and states she is still nauseous. Pt denies respiratory distress, respirations are even and unlabored. Skin is warm and dry w/ pink mucosa. VS.  Will continue to monitor closely.

## 2023-01-18 ENCOUNTER — HOSPITAL ENCOUNTER (EMERGENCY)
Facility: OTHER | Age: 23
Discharge: HOME | End: 2023-01-18
Attending: EMERGENCY MEDICINE
Payer: MEDICAID

## 2023-01-18 VITALS
SYSTOLIC BLOOD PRESSURE: 110 MMHG | RESPIRATION RATE: 18 BRPM | DIASTOLIC BLOOD PRESSURE: 71 MMHG | TEMPERATURE: 98 F | OXYGEN SATURATION: 100 % | HEART RATE: 65 BPM

## 2023-01-18 DIAGNOSIS — R55 SYNCOPE: ICD-10-CM

## 2023-01-18 DIAGNOSIS — R10.9 ABDOMINAL PAIN, UNSPECIFIED ABDOMINAL LOCATION: ICD-10-CM

## 2023-01-18 DIAGNOSIS — R11.2 NAUSEA AND VOMITING, UNSPECIFIED VOMITING TYPE: Primary | ICD-10-CM

## 2023-01-18 LAB
ALBUMIN SERPL BCP-MCNC: 4 G/DL (ref 3.5–5.2)
ALP SERPL-CCNC: 46 U/L (ref 55–135)
ALT SERPL W/O P-5'-P-CCNC: 11 U/L (ref 10–44)
ANION GAP SERPL CALC-SCNC: 6 MMOL/L (ref 8–16)
AST SERPL-CCNC: 14 U/L (ref 10–40)
B-HCG UR QL: NEGATIVE
BACTERIA #/AREA URNS HPF: ABNORMAL /HPF
BASOPHILS # BLD AUTO: 0.04 K/UL (ref 0–0.2)
BASOPHILS NFR BLD: 0.5 % (ref 0–1.9)
BILIRUB SERPL-MCNC: 0.6 MG/DL (ref 0.1–1)
BILIRUB UR QL STRIP: NEGATIVE
BUN SERPL-MCNC: 6 MG/DL (ref 6–20)
CALCIUM SERPL-MCNC: 9.2 MG/DL (ref 8.7–10.5)
CHLORIDE SERPL-SCNC: 107 MMOL/L (ref 95–110)
CLARITY UR: ABNORMAL
CO2 SERPL-SCNC: 27 MMOL/L (ref 23–29)
COLOR UR: YELLOW
CREAT SERPL-MCNC: 0.7 MG/DL (ref 0.5–1.4)
CTP QC/QA: YES
DIFFERENTIAL METHOD: ABNORMAL
EOSINOPHIL # BLD AUTO: 0 K/UL (ref 0–0.5)
EOSINOPHIL NFR BLD: 0.1 % (ref 0–8)
ERYTHROCYTE [DISTWIDTH] IN BLOOD BY AUTOMATED COUNT: 12.8 % (ref 11.5–14.5)
EST. GFR  (NO RACE VARIABLE): >60 ML/MIN/1.73 M^2
GLUCOSE SERPL-MCNC: 75 MG/DL (ref 70–110)
GLUCOSE UR QL STRIP: NEGATIVE
HCT VFR BLD AUTO: 37.3 % (ref 37–48.5)
HGB BLD-MCNC: 12.7 G/DL (ref 12–16)
HGB UR QL STRIP: NEGATIVE
IMM GRANULOCYTES # BLD AUTO: 0.02 K/UL (ref 0–0.04)
IMM GRANULOCYTES NFR BLD AUTO: 0.3 % (ref 0–0.5)
KETONES UR QL STRIP: NEGATIVE
LEUKOCYTE ESTERASE UR QL STRIP: ABNORMAL
LIPASE SERPL-CCNC: 9 U/L (ref 4–60)
LYMPHOCYTES # BLD AUTO: 2.4 K/UL (ref 1–4.8)
LYMPHOCYTES NFR BLD: 32.3 % (ref 18–48)
MAGNESIUM SERPL-MCNC: 1.9 MG/DL (ref 1.6–2.6)
MCH RBC QN AUTO: 33.2 PG (ref 27–31)
MCHC RBC AUTO-ENTMCNC: 34 G/DL (ref 32–36)
MCV RBC AUTO: 98 FL (ref 82–98)
MICROSCOPIC COMMENT: ABNORMAL
MONOCYTES # BLD AUTO: 0.5 K/UL (ref 0.3–1)
MONOCYTES NFR BLD: 6.8 % (ref 4–15)
NEUTROPHILS # BLD AUTO: 4.5 K/UL (ref 1.8–7.7)
NEUTROPHILS NFR BLD: 60 % (ref 38–73)
NITRITE UR QL STRIP: NEGATIVE
NRBC BLD-RTO: 0 /100 WBC
PH UR STRIP: 7 [PH] (ref 5–8)
PHOSPHATE SERPL-MCNC: 2.2 MG/DL (ref 2.7–4.5)
PLATELET # BLD AUTO: 231 K/UL (ref 150–450)
PMV BLD AUTO: 10.1 FL (ref 9.2–12.9)
POTASSIUM SERPL-SCNC: 3.8 MMOL/L (ref 3.5–5.1)
PROT SERPL-MCNC: 6.7 G/DL (ref 6–8.4)
PROT UR QL STRIP: NEGATIVE
RBC # BLD AUTO: 3.82 M/UL (ref 4–5.4)
RBC #/AREA URNS HPF: 1 /HPF (ref 0–4)
SODIUM SERPL-SCNC: 140 MMOL/L (ref 136–145)
SP GR UR STRIP: 1.01 (ref 1–1.03)
SQUAMOUS #/AREA URNS HPF: 12 /HPF
URN SPEC COLLECT METH UR: ABNORMAL
UROBILINOGEN UR STRIP-ACNC: NEGATIVE EU/DL
WBC # BLD AUTO: 7.46 K/UL (ref 3.9–12.7)
WBC #/AREA URNS HPF: 17 /HPF (ref 0–5)

## 2023-01-18 PROCEDURE — 93010 EKG 12-LEAD: ICD-10-PCS | Mod: ,,, | Performed by: INTERNAL MEDICINE

## 2023-01-18 PROCEDURE — 93010 ELECTROCARDIOGRAM REPORT: CPT | Mod: ,,, | Performed by: INTERNAL MEDICINE

## 2023-01-18 PROCEDURE — 25000003 PHARM REV CODE 250: Performed by: PHYSICIAN ASSISTANT

## 2023-01-18 PROCEDURE — 87086 URINE CULTURE/COLONY COUNT: CPT | Performed by: PHYSICIAN ASSISTANT

## 2023-01-18 PROCEDURE — 99285 EMERGENCY DEPT VISIT HI MDM: CPT | Mod: 25

## 2023-01-18 PROCEDURE — 96374 THER/PROPH/DIAG INJ IV PUSH: CPT

## 2023-01-18 PROCEDURE — 80053 COMPREHEN METABOLIC PANEL: CPT | Performed by: PHYSICIAN ASSISTANT

## 2023-01-18 PROCEDURE — 85025 COMPLETE CBC W/AUTO DIFF WBC: CPT | Performed by: PHYSICIAN ASSISTANT

## 2023-01-18 PROCEDURE — 93005 ELECTROCARDIOGRAM TRACING: CPT

## 2023-01-18 PROCEDURE — 84100 ASSAY OF PHOSPHORUS: CPT | Performed by: PHYSICIAN ASSISTANT

## 2023-01-18 PROCEDURE — 83690 ASSAY OF LIPASE: CPT | Performed by: PHYSICIAN ASSISTANT

## 2023-01-18 PROCEDURE — 81025 URINE PREGNANCY TEST: CPT | Performed by: PHYSICIAN ASSISTANT

## 2023-01-18 PROCEDURE — 63600175 PHARM REV CODE 636 W HCPCS: Performed by: PHYSICIAN ASSISTANT

## 2023-01-18 PROCEDURE — 81000 URINALYSIS NONAUTO W/SCOPE: CPT | Performed by: PHYSICIAN ASSISTANT

## 2023-01-18 PROCEDURE — 83735 ASSAY OF MAGNESIUM: CPT | Performed by: PHYSICIAN ASSISTANT

## 2023-01-18 PROCEDURE — 96361 HYDRATE IV INFUSION ADD-ON: CPT

## 2023-01-18 RX ORDER — ONDANSETRON 2 MG/ML
4 INJECTION INTRAMUSCULAR; INTRAVENOUS
Status: COMPLETED | OUTPATIENT
Start: 2023-01-18 | End: 2023-01-18

## 2023-01-18 RX ADMIN — SODIUM CHLORIDE 1000 ML: 9 INJECTION, SOLUTION INTRAVENOUS at 11:01

## 2023-01-18 RX ADMIN — ONDANSETRON 4 MG: 2 INJECTION INTRAMUSCULAR; INTRAVENOUS at 11:01

## 2023-01-18 NOTE — Clinical Note
"Julius Duran" Sudhir was seen and treated in our emergency department on 1/18/2023.  She may return to work on 01/23/2023.       If you have any questions or concerns, please don't hesitate to call.      Sandra Guerrero PA-C"

## 2023-01-18 NOTE — ED NOTES
Lying- 107/67  Heart rate-60  SpO2- 100%  20R    Sitting-110/71  Heart rate 57  SpO2 100  19R      Standing- 113/77  Heart rate-65  SpO2-100  26R

## 2023-01-18 NOTE — ED TRIAGE NOTES
"Present with c/o " I passed out at work" pt reports feeling dizzy and light headed, that he had not taken her medicine this am, pt states I was getting  light headed and " as I walked get " I just passed out" family reports pt was caught before falling, pt reports passing out as she walked to get in an elevator     Currently pt c/o light headiness, c/o upper abd pain, reports abd pain since yesterday, rates pain 4/10, denies n/v/d, report vomiting yesterday   "

## 2023-01-19 LAB
BACTERIA UR CULT: NORMAL
BACTERIA UR CULT: NORMAL

## 2023-01-19 NOTE — ED PROVIDER NOTES
Encounter Date: 1/18/2023       History     Chief Complaint   Patient presents with    Abdominal Pain     Pt was seen yesterday for LUQ pain. Pt was d/c'd home w meds but pt never got them filled. Pt was at work this morning and started to feel weak, had a witnessed syncopal episode . Pt aaox3, upon arrival      Patient is a 22-year-old female with no significant medical history who presents to the emergency department with abdominal pain.  Patient reports she was seen in the emergency department over the night for nausea and vomiting.  She reports she was discharged early this morning.  She reports she attempted to go to work, and while at work she began to feel ill again.  She states she was having epigastric pain.  She states the pain worsened and she began to vomit.  She reports she was walking out to her car, when she fell passing out.  She denies hitting her head.  She reports she still has cramping in her upper abdomen.    The history is provided by the patient.   Review of patient's allergies indicates:  No Known Allergies  No past medical history on file.  Past Surgical History:   Procedure Laterality Date    LAPAROSCOPIC SURGICAL REMOVAL OF CYST OF OVARY N/A 5/5/2022    Procedure: EXCISION, CYST, OVARY, LAPAROSCOPIC;  Surgeon: Alysia Lazo MD;  Location: [facility];  Service: OB/GYN;  Laterality: N/A;    MOUTH SURGERY       No family history on file.  Social History     Tobacco Use    Smoking status: Never    Smokeless tobacco: Never   Substance Use Topics    Alcohol use: No    Drug use: Yes     Types: Marijuana     Review of Systems   Constitutional:  Positive for activity change and appetite change. Negative for chills, fatigue and fever.   HENT:  Negative for congestion, ear discharge, ear pain, postnasal drip, rhinorrhea, sore throat and trouble swallowing.    Respiratory:  Negative for cough and shortness of breath.    Cardiovascular:  Negative for chest pain.   Gastrointestinal:  Positive for  abdominal pain, nausea and vomiting. Negative for blood in stool, constipation and diarrhea.   Genitourinary:  Negative for dysuria, flank pain and hematuria.   Musculoskeletal:  Negative for back pain, neck pain and neck stiffness.   Skin:  Negative for rash and wound.   Neurological:  Positive for syncope. Negative for dizziness, light-headedness and headaches.     Physical Exam     Initial Vitals [01/18/23 0958]   BP Pulse Resp Temp SpO2   113/76 (!) 58 16 98 °F (36.7 °C) 100 %      MAP       --         Physical Exam    Nursing note and vitals reviewed.  Constitutional: She appears well-developed and well-nourished. She is not diaphoretic.  Non-toxic appearance. No distress.   HENT:   Head: Normocephalic.   Right Ear: Hearing and external ear normal.   Left Ear: Hearing and external ear normal.   Nose: Nose normal.   Mouth/Throat: Uvula is midline and oropharynx is clear and moist. No oropharyngeal exudate.   Eyes: Conjunctivae are normal. Pupils are equal, round, and reactive to light.   Neck:   Normal range of motion.  Cardiovascular:  Normal rate and regular rhythm.           Pulmonary/Chest: Breath sounds normal.   Abdominal: Abdomen is soft. Bowel sounds are normal. There is abdominal tenderness in the right upper quadrant, epigastric area and left upper quadrant.   Musculoskeletal:      Cervical back: Normal range of motion.     Lymphadenopathy:     She has no cervical adenopathy.   Neurological: She is alert and oriented to person, place, and time.   Skin: Skin is warm and dry. Capillary refill takes less than 2 seconds.   Psychiatric: She has a normal mood and affect.       ED Course   Procedures  Labs Reviewed   CBC W/ AUTO DIFFERENTIAL - Abnormal; Notable for the following components:       Result Value    RBC 3.82 (*)     MCH 33.2 (*)     All other components within normal limits   COMPREHENSIVE METABOLIC PANEL - Abnormal; Notable for the following components:    Alkaline Phosphatase 46 (*)     Anion  Gap 6 (*)     All other components within normal limits   URINALYSIS, REFLEX TO URINE CULTURE - Abnormal; Notable for the following components:    Appearance, UA Hazy (*)     Leukocytes, UA 2+ (*)     All other components within normal limits    Narrative:     Specimen Source->Urine   PHOSPHORUS - Abnormal; Notable for the following components:    Phosphorus 2.2 (*)     All other components within normal limits   URINALYSIS MICROSCOPIC - Abnormal; Notable for the following components:    WBC, UA 17 (*)     All other components within normal limits    Narrative:     Specimen Source->Urine   CULTURE, URINE   LIPASE   MAGNESIUM   POCT URINE PREGNANCY          Imaging Results              US Abdomen Limited (Final result)  Result time 01/18/23 12:23:33      Final result by Aryan Loredo MD (01/18/23 12:23:33)                   Impression:      No findings to suggest acute cholecystitis.    Possible hepatic steatosis, correlation with LFTs recommended.      Electronically signed by: Aryan Loredo MD  Date:    01/18/2023  Time:    12:23               Narrative:    EXAMINATION:  US ABDOMEN LIMITED    CLINICAL HISTORY:  epigastric pain;    TECHNIQUE:  Limited ultrasound of the right upper quadrant of the abdomen (including pancreas, liver, gallbladder, common bile duct, and spleen) was performed.    COMPARISON:  CT renal stone study 05/01/2022    FINDINGS:  The visualized portions of the pancreas are unremarkable.  The gallbladder is nondistended.  No gallbladder wall thickening or pericholecystic fluid.  No gallbladder wall hyperemia.  Sonographic Mata sign is negative.  The common duct is not dilated measuring 0.3 cm.  The hepatic parenchyma is somewhat echogenic, possibly reflecting steatosis or related to technique, correlation with LFTs recommended.  The visualized portions of the right kidney are unremarkable.  No ascites.                                       Medications   sodium chloride 0.9% bolus 1,000 mL  1,000 mL (0 mLs Intravenous Stopped 1/18/23 1223)   ondansetron injection 4 mg (4 mg Intravenous Given 1/18/23 1121)     Medical Decision Making:   Initial Assessment:   Urgent evaluation of a 22-year-old female who presents to the emergency department with abdominal pain.  Patient is afebrile and nontoxic appearing.  Epigastric pain on exam.  Labs are obtained revealing no elevation in LFTs.  No elevation in lipase.  No elevation in bilirubin.  Ultrasound reveals no acute findings.  No kidney insufficiency or electrolyte disturbance.  EKG shows no dangerous dysrhythmia.  Patient is given fluids and Zofran.  On repeat exam she is feeling much better.  She admits to smoking marijuana earlier this morning.  She is urged to discontinue use of marijuana.  She is advised to follow up with GI.  Advised to follow up with Cardiology.  Advised to return to the emergency department with any worsening symptoms or concerns.                        Clinical Impression:   Final diagnoses:  [R55] Syncope  [R11.2] Nausea and vomiting, unspecified vomiting type (Primary)  [R10.9] Abdominal pain, unspecified abdominal location        ED Disposition Condition    Discharge Stable          ED Prescriptions    None       Follow-up Information       Follow up With Specialties Details Why Contact Info Additional Information    [facility] [address] Gastroenterology   [address]  [facility] [address] [phone] [facility] - Main Building, 4th Floor Please park in [facility] and take [address] elevator    [facility]   [address] [facility], [address]  [facility] [address] [phone] [facility], 6th Floor, [address] Please park in [facility] and use [address] elevators             Sandra Guerrero PA-C  01/18/23 3672

## 2023-01-20 ENCOUNTER — PATIENT MESSAGE (OUTPATIENT)
Dept: ADMINISTRATIVE | Facility: OTHER | Age: 23
End: 2023-01-20
Payer: MEDICAID

## 2023-01-28 ENCOUNTER — PATIENT MESSAGE (OUTPATIENT)
Dept: OBSTETRICS AND GYNECOLOGY | Facility: CLINIC | Age: 23
End: 2023-01-28
Payer: MEDICAID

## 2023-01-30 ENCOUNTER — PATIENT MESSAGE (OUTPATIENT)
Dept: ADMINISTRATIVE | Facility: OTHER | Age: 23
End: 2023-01-30
Payer: MEDICAID

## 2023-03-04 ENCOUNTER — TELEPHONE (OUTPATIENT)
Dept: ADMINISTRATIVE | Facility: OTHER | Age: 23
End: 2023-03-04
Payer: MEDICAID

## 2023-03-04 NOTE — TELEPHONE ENCOUNTER
LM for patient to contact our scheduling office to discuss rescheduling Cardiology appointment of 2-9-2023. Contact number provided.

## 2023-10-06 ENCOUNTER — HOSPITAL ENCOUNTER (EMERGENCY)
Facility: OTHER | Age: 23
Discharge: HOME | End: 2023-10-06
Attending: EMERGENCY MEDICINE
Payer: MEDICAID

## 2023-10-06 VITALS
RESPIRATION RATE: 18 BRPM | HEART RATE: 69 BPM | OXYGEN SATURATION: 100 % | DIASTOLIC BLOOD PRESSURE: 72 MMHG | HEIGHT: 63 IN | WEIGHT: 110 LBS | TEMPERATURE: 98 F | SYSTOLIC BLOOD PRESSURE: 116 MMHG | BODY MASS INDEX: 19.49 KG/M2

## 2023-10-06 DIAGNOSIS — N83.209 CYST OF OVARY, UNSPECIFIED LATERALITY: ICD-10-CM

## 2023-10-06 DIAGNOSIS — R10.2 PELVIC PAIN: Primary | ICD-10-CM

## 2023-10-06 DIAGNOSIS — R10.9 ABDOMINAL CRAMPS: ICD-10-CM

## 2023-10-06 LAB
ANION GAP SERPL CALC-SCNC: 10 MMOL/L (ref 8–16)
B-HCG UR QL: NEGATIVE
BASOPHILS # BLD AUTO: 0.04 K/UL (ref 0–0.2)
BASOPHILS NFR BLD: 0.6 % (ref 0–1.9)
BILIRUB UR QL STRIP: NEGATIVE
BUN SERPL-MCNC: 8 MG/DL (ref 6–20)
CALCIUM SERPL-MCNC: 9.4 MG/DL (ref 8.7–10.5)
CHLORIDE SERPL-SCNC: 106 MMOL/L (ref 95–110)
CLARITY UR: CLEAR
CO2 SERPL-SCNC: 23 MMOL/L (ref 23–29)
COLOR UR: YELLOW
CREAT SERPL-MCNC: 0.8 MG/DL (ref 0.5–1.4)
CTP QC/QA: YES
DIFFERENTIAL METHOD: ABNORMAL
EOSINOPHIL # BLD AUTO: 0 K/UL (ref 0–0.5)
EOSINOPHIL NFR BLD: 0.3 % (ref 0–8)
ERYTHROCYTE [DISTWIDTH] IN BLOOD BY AUTOMATED COUNT: 12.5 % (ref 11.5–14.5)
EST. GFR  (NO RACE VARIABLE): >60 ML/MIN/1.73 M^2
GLUCOSE SERPL-MCNC: 79 MG/DL (ref 70–110)
GLUCOSE UR QL STRIP: NEGATIVE
HCT VFR BLD AUTO: 38.5 % (ref 37–48.5)
HGB BLD-MCNC: 13.1 G/DL (ref 12–16)
HGB UR QL STRIP: NEGATIVE
IMM GRANULOCYTES # BLD AUTO: 0.02 K/UL (ref 0–0.04)
IMM GRANULOCYTES NFR BLD AUTO: 0.3 % (ref 0–0.5)
KETONES UR QL STRIP: ABNORMAL
LEUKOCYTE ESTERASE UR QL STRIP: NEGATIVE
LYMPHOCYTES # BLD AUTO: 2 K/UL (ref 1–4.8)
LYMPHOCYTES NFR BLD: 28.1 % (ref 18–48)
MCH RBC QN AUTO: 32.8 PG (ref 27–31)
MCHC RBC AUTO-ENTMCNC: 34 G/DL (ref 32–36)
MCV RBC AUTO: 97 FL (ref 82–98)
MONOCYTES # BLD AUTO: 0.5 K/UL (ref 0.3–1)
MONOCYTES NFR BLD: 7.8 % (ref 4–15)
NEUTROPHILS # BLD AUTO: 4.4 K/UL (ref 1.8–7.7)
NEUTROPHILS NFR BLD: 62.9 % (ref 38–73)
NITRITE UR QL STRIP: NEGATIVE
NRBC BLD-RTO: 0 /100 WBC
PH UR STRIP: 8 [PH] (ref 5–8)
PLATELET # BLD AUTO: 199 K/UL (ref 150–450)
PMV BLD AUTO: 9.9 FL (ref 9.2–12.9)
POTASSIUM SERPL-SCNC: 3.5 MMOL/L (ref 3.5–5.1)
PROT UR QL STRIP: ABNORMAL
RBC # BLD AUTO: 3.99 M/UL (ref 4–5.4)
SODIUM SERPL-SCNC: 139 MMOL/L (ref 136–145)
SP GR UR STRIP: 1.02 (ref 1–1.03)
URN SPEC COLLECT METH UR: ABNORMAL
UROBILINOGEN UR STRIP-ACNC: ABNORMAL EU/DL
WBC # BLD AUTO: 6.94 K/UL (ref 3.9–12.7)

## 2023-10-06 PROCEDURE — 81025 URINE PREGNANCY TEST: CPT | Performed by: EMERGENCY MEDICINE

## 2023-10-06 PROCEDURE — 85025 COMPLETE CBC W/AUTO DIFF WBC: CPT | Performed by: EMERGENCY MEDICINE

## 2023-10-06 PROCEDURE — 63600175 PHARM REV CODE 636 W HCPCS: Performed by: EMERGENCY MEDICINE

## 2023-10-06 PROCEDURE — 99285 EMERGENCY DEPT VISIT HI MDM: CPT | Mod: 25

## 2023-10-06 PROCEDURE — 96374 THER/PROPH/DIAG INJ IV PUSH: CPT

## 2023-10-06 PROCEDURE — 80048 BASIC METABOLIC PNL TOTAL CA: CPT | Performed by: EMERGENCY MEDICINE

## 2023-10-06 PROCEDURE — 81003 URINALYSIS AUTO W/O SCOPE: CPT | Performed by: EMERGENCY MEDICINE

## 2023-10-06 RX ORDER — IBUPROFEN 600 MG/1
600 TABLET ORAL EVERY 6 HOURS PRN
Qty: 20 TABLET | Refills: 0 | Status: SHIPPED | OUTPATIENT
Start: 2023-10-06

## 2023-10-06 RX ORDER — ACETAMINOPHEN 500 MG
1000 TABLET ORAL EVERY 6 HOURS PRN
Qty: 50 TABLET | Refills: 0 | Status: SHIPPED | OUTPATIENT
Start: 2023-10-06

## 2023-10-06 RX ORDER — KETOROLAC TROMETHAMINE 30 MG/ML
10 INJECTION, SOLUTION INTRAMUSCULAR; INTRAVENOUS
Status: COMPLETED | OUTPATIENT
Start: 2023-10-06 | End: 2023-10-06

## 2023-10-06 RX ADMIN — KETOROLAC TROMETHAMINE 10 MG: 30 INJECTION, SOLUTION INTRAMUSCULAR at 08:10

## 2023-10-06 NOTE — DISCHARGE INSTRUCTIONS
Mrs. Peguero,    Thank you for letting me care for you today! It was nice meeting you, and I hope you feel better soon.   If you would like access to your chart and what was done today please utilize the [facility].   Please come back to [facility] for all of your future medical needs.    Our goal in the emergency department is to always give you outstanding care and exceptional service. You may receive a survey by mail or e-mail in the next week regarding your experience in our ED. We would greatly appreciate you completing and returning the survey. Your feedback provides us with a way to recognize our staff who give very good care and it helps us learn how to improve when your experience was below our aspiration of excellence.     Sincerely,    Ravinder Atkinson MD  Board Certified Emergency Physician

## 2023-10-06 NOTE — Clinical Note
"Julius Trania" Sudhir was seen and treated in our emergency department on 10/6/2023.  She may return to work on 10/09/2023.       If you have any questions or concerns, please don't hesitate to call.      Ravinder Atkinson MD"

## 2023-10-06 NOTE — Clinical Note
"Julius Trania" Sudhir was seen and treated in our emergency department on 10/6/2023.  She may return to work on 10/09/2023.       If you have any questions or concerns, please don't hesitate to call.      Dr. China MD RN    "

## 2023-10-06 NOTE — ED PROVIDER NOTES
Encounter Date: 10/6/2023    SCRIBE #1 NOTE: IJoseluis, felix scribing for, and in the presence of,  Ravinder Eastman MD. I have scribed the following portions of the note - Other sections scribed: HPI, ROS.       History     Chief Complaint   Patient presents with    Pelvic Pain     Pelvic pain, diagnosed with ovarian cyst last year     Time seen by provider: 8:32 AM    This is a 23 y.o. female with history of ovarian cysts who presents with complaint of  diffuse lower abdominal pain worst on the left, where it radiates into her back. She describes a constant pressure which began today. Patient has not taken any medication for her symptoms. She denies any constipation or dysuria. Patient likens the pain to an instance of an ovarian cyst last year with subsequent laparoscopic` removal. Her LMP was 2.5 weeks ago and she is regular. No further medical or surgical history. She is not on any daily medication. SHx includes daily marijuana use, occasional alcohol use, but no tobacco or other drug use. This is the extent of the patient's complaints at this time.    The history is provided by the patient.     Review of patient's allergies indicates:  No Known Allergies  History reviewed. No pertinent past medical history.  Past Surgical History:   Procedure Laterality Date    LAPAROSCOPIC SURGICAL REMOVAL OF CYST OF OVARY N/A 5/5/2022    Procedure: EXCISION, CYST, OVARY, LAPAROSCOPIC;  Surgeon: Alysia Lazo MD;  Location: [facility];  Service: OB/GYN;  Laterality: N/A;    MOUTH SURGERY       History reviewed. No pertinent family history.  Social History     Tobacco Use    Smoking status: Never    Smokeless tobacco: Never   Substance Use Topics    Alcohol use: No    Drug use: Yes     Types: Marijuana     Review of Systems  Constitutional-no fever  HEENT-no congestion  Eyes-no redness  Respiratory-no shortness of breath  Cardio-no chest pain  GI-positive abdominal pain  Endocrine-no cold intolerance  -no  difficulty urinating  MSK-no myalgias  Skin-no rashes  Allergy-no environmental allergy  Neurologic-, no headache  Hematology-no swollen nodes  Behavioral-no confusion    Physical Exam     Initial Vitals [10/06/23 0815]   BP Pulse Resp Temp SpO2   137/88 82 18 98 °F (36.7 °C) 99 %      MAP       --         Physical Exam  Constitutional: Well appearing, no distress.  Eyes: Conjunctivae normal.  ENT       Head: Normocephalic, atraumatic.       Nose: No congestion.       Mouth/Throat: Mucous membranes are moist.  Hematological/Lymphatic/Immunilogical: No cervical lymphadenopathy.  Cardiovascular: Normal rate, regular rhythm. Normal and symmetric distal pulses.  Respiratory: Normal respiratory effort. Breath sounds are normal.  Gastrointestinal: Soft, mild tenderness in the suprapubic aspect of the abdomen   Musculoskeletal: Normal range of motion in all extremities. No obvious deformities or swelling.  Neurologic: Alert, oriented. Normal speech and language. No gross focal neurologic deficits are appreciated.  Skin: Skin is warm, dry. No rash noted.  Psychiatric: Mood and affect are normal.     ED Course   Procedures  Labs Reviewed   URINALYSIS, REFLEX TO URINE CULTURE - Abnormal; Notable for the following components:       Result Value    Protein, UA Trace (*)     Ketones, UA 1+ (*)     Urobilinogen, UA 2.0-3.0 (*)     All other components within normal limits    Narrative:     Specimen Source->Urine   CBC W/ AUTO DIFFERENTIAL - Abnormal; Notable for the following components:    RBC 3.99 (*)     MCH 32.8 (*)     All other components within normal limits   BASIC METABOLIC PANEL   POCT URINE PREGNANCY          Imaging Results              US Transvaginal Non OB (Final result)  Result time 10/06/23 09:30:19      Final result by Andriy Iverson MD (10/06/23 09:30:19)                   Impression:      1. Heterogeneous echotexture lesion associated with the right ovary with increased peripheral vascularity suggests  hemorrhagic corpus luteum cyst.  Imaging follow-up to resolution in 4-6 weeks could be considered.  2. Somewhat heterogeneous free fluid dependently in the cul-de-sac could relate to component of hemorrhage, possibly related to above described right-sided corpus luteum cyst.  Clinical correlation recommended.  3. Unremarkable sonographic appearance of the uterus and left ovary.      Electronically signed by: Andriy Iverson  Date:    10/06/2023  Time:    09:30               Narrative:    EXAMINATION:  US TRANSVAGINAL NON OB    CLINICAL HISTORY:  Unspecified abdominal pain    TECHNIQUE:  Transvaginal sonography of the uterus and ovaries.    COMPARISON:  Pelvic ultrasound 11/21/2022.    FINDINGS:  Uterus:    Size: 6.8 x 3.7 x 4 cm    Masses: None    Endometrium: Normal in this pre menopausal patient, measuring 11 mm.    Right ovary:    Size: 3.8 x 2.7 x 4.6 cm    Appearance: Heterogeneous, mostly hypoechoic region measuring on the order of 2.6 x 1.8 x 1.8 cm with peripheral increased flow.    Vascular flow: Preserved flow to the right ovary.  Increased flow peripherally.    Left ovary:    Size: 1.9 x 2.5 x 1.8 cm    Appearance: Normal    Vascular Flow: Normal.    Free Fluid:    Small volume heterogeneous free fluid.                                       Medications   ketorolac injection 9.999 mg (9.999 mg Intravenous Given 10/6/23 0848)     Medical Decision Making  Ddx- ovarian cyst, ovarian torsion, ectopic pregnancy, hemorrhagic cysts, cystitis, pyelonephritis    Well appearing normal vitals.  Stable labs, US consistent with cyst without torsion, exam consistent with cyst without torsion. Plan for treatment of the same and return in case of worsening.      Problems Addressed:  Abdominal cramps: acute illness or injury  Cyst of ovary, unspecified laterality: chronic illness or injury with exacerbation, progression, or side effects of treatment  Pelvic pain: acute illness or injury    Amount and/or Complexity of Data  Reviewed  External Data Reviewed: labs, radiology, ECG and notes.     Details: History of ovarian cyst  Labs: ordered. Decision-making details documented in ED Course.  Radiology: ordered and independent interpretation performed. Decision-making details documented in ED Course.    Risk  OTC drugs.  Prescription drug management.            Scribe Attestation:   Scribe #1: I performed the above scribed service and the documentation accurately describes the services I performed. I attest to the accuracy of the note.    Physician Attestation for Scribe: I, ravinder atkinson, reviewed documentation as scribed in my presence, which is both accurate and complete.                        Clinical Impression:   Final diagnoses:  [R10.9] Abdominal cramps  [R10.2] Pelvic pain (Primary)  [N83.209] Cyst of ovary, unspecified laterality        ED Disposition Condition    Discharge Stable          ED Prescriptions       Medication Sig Dispense Start Date End Date Auth. Provider    ibuprofen (ADVIL,MOTRIN) 600 MG tablet Take 1 tablet (600 mg total) by mouth every 6 (six) hours as needed for Pain. 20 tablet 10/6/2023 -- Ravinder Atkinson MD    acetaminophen (TYLENOL) 500 MG tablet Take 2 tablets (1,000 mg total) by mouth every 6 (six) hours as needed. 50 tablet 10/6/2023 -- Ravinder Atkinson MD          Follow-up Information       Follow up With Specialties Details Why Contact Info    Alysia Lazo MD Obstetrics and Gynecology Call today If symptoms worsen, For a follow up visit about today [facility] [address]  [phone]               Ravinder Atkinson MD  10/06/23 2438

## 2024-09-22 ENCOUNTER — HOSPITAL ENCOUNTER (EMERGENCY)
Facility: OTHER | Age: 24
Discharge: HOME | End: 2024-09-23
Attending: EMERGENCY MEDICINE
Payer: MEDICAID

## 2024-09-22 DIAGNOSIS — R10.11 RIGHT UPPER QUADRANT ABDOMINAL PAIN: ICD-10-CM

## 2024-09-22 DIAGNOSIS — O20.0 THREATENED MISCARRIAGE: Primary | ICD-10-CM

## 2024-09-22 DIAGNOSIS — Z3A.09 9 WEEKS GESTATION OF PREGNANCY: ICD-10-CM

## 2024-09-22 LAB
ABO + RH BLD: NORMAL
ALBUMIN SERPL BCP-MCNC: 3.7 G/DL (ref 3.5–5.2)
ALP SERPL-CCNC: 40 U/L (ref 55–135)
ALT SERPL W/O P-5'-P-CCNC: 12 U/L (ref 10–44)
ANION GAP SERPL CALC-SCNC: 8 MMOL/L (ref 8–16)
AST SERPL-CCNC: 16 U/L (ref 10–40)
B-HCG UR QL: POSITIVE
BACTERIA #/AREA URNS HPF: ABNORMAL /HPF
BACTERIA GENITAL QL WET PREP: ABNORMAL
BASOPHILS # BLD AUTO: 0.05 K/UL (ref 0–0.2)
BASOPHILS NFR BLD: 0.8 % (ref 0–1.9)
BILIRUB SERPL-MCNC: 0.3 MG/DL (ref 0.1–1)
BILIRUB UR QL STRIP: NEGATIVE
BUN SERPL-MCNC: 7 MG/DL (ref 6–20)
CALCIUM SERPL-MCNC: 8.6 MG/DL (ref 8.7–10.5)
CHLORIDE SERPL-SCNC: 104 MMOL/L (ref 95–110)
CLARITY UR: CLEAR
CLUE CELLS VAG QL WET PREP: ABNORMAL
CO2 SERPL-SCNC: 22 MMOL/L (ref 23–29)
COLOR UR: YELLOW
CREAT SERPL-MCNC: 0.7 MG/DL (ref 0.5–1.4)
CTP QC/QA: YES
DIFFERENTIAL METHOD BLD: ABNORMAL
EOSINOPHIL # BLD AUTO: 0.1 K/UL (ref 0–0.5)
EOSINOPHIL NFR BLD: 1.9 % (ref 0–8)
ERYTHROCYTE [DISTWIDTH] IN BLOOD BY AUTOMATED COUNT: 12.4 % (ref 11.5–14.5)
EST. GFR  (NO RACE VARIABLE): >60 ML/MIN/1.73 M^2
FILAMENT FUNGI VAG WET PREP-#/AREA: ABNORMAL
GLUCOSE SERPL-MCNC: 62 MG/DL (ref 70–110)
GLUCOSE UR QL STRIP: NEGATIVE
HCG INTACT+B SERPL-ACNC: NORMAL MIU/ML
HCT VFR BLD AUTO: 33.1 % (ref 37–48.5)
HGB BLD-MCNC: 11.4 G/DL (ref 12–16)
HGB UR QL STRIP: NEGATIVE
HYALINE CASTS #/AREA URNS LPF: 2 /LPF
IMM GRANULOCYTES # BLD AUTO: 0.01 K/UL (ref 0–0.04)
IMM GRANULOCYTES NFR BLD AUTO: 0.2 % (ref 0–0.5)
KETONES UR QL STRIP: ABNORMAL
LEUKOCYTE ESTERASE UR QL STRIP: NEGATIVE
LYMPHOCYTES # BLD AUTO: 2.2 K/UL (ref 1–4.8)
LYMPHOCYTES NFR BLD: 35.3 % (ref 18–48)
MCH RBC QN AUTO: 33 PG (ref 27–31)
MCHC RBC AUTO-ENTMCNC: 34.4 G/DL (ref 32–36)
MCV RBC AUTO: 96 FL (ref 82–98)
MICROSCOPIC COMMENT: ABNORMAL
MONOCYTES # BLD AUTO: 0.8 K/UL (ref 0.3–1)
MONOCYTES NFR BLD: 12.8 % (ref 4–15)
NEUTROPHILS # BLD AUTO: 3 K/UL (ref 1.8–7.7)
NEUTROPHILS NFR BLD: 49 % (ref 38–73)
NITRITE UR QL STRIP: NEGATIVE
NRBC BLD-RTO: 0 /100 WBC
PH UR STRIP: 7 [PH] (ref 5–8)
PLATELET # BLD AUTO: 263 K/UL (ref 150–450)
PMV BLD AUTO: 9.7 FL (ref 9.2–12.9)
POTASSIUM SERPL-SCNC: 3.6 MMOL/L (ref 3.5–5.1)
PROT SERPL-MCNC: 6.8 G/DL (ref 6–8.4)
PROT UR QL STRIP: ABNORMAL
RBC # BLD AUTO: 3.45 M/UL (ref 4–5.4)
RBC #/AREA URNS HPF: 2 /HPF (ref 0–4)
SODIUM SERPL-SCNC: 134 MMOL/L (ref 136–145)
SP GR UR STRIP: >1.03 (ref 1–1.03)
SPECIMEN SOURCE: ABNORMAL
SQUAMOUS #/AREA URNS HPF: 6 /HPF
T VAGINALIS GENITAL QL WET PREP: ABNORMAL
URN SPEC COLLECT METH UR: ABNORMAL
UROBILINOGEN UR STRIP-ACNC: >=8 EU/DL
WBC # BLD AUTO: 6.17 K/UL (ref 3.9–12.7)
WBC #/AREA URNS HPF: 1 /HPF (ref 0–5)
WBC #/AREA VAG WET PREP: ABNORMAL
YEAST GENITAL QL WET PREP: ABNORMAL

## 2024-09-22 PROCEDURE — 84702 CHORIONIC GONADOTROPIN TEST: CPT | Performed by: PHYSICIAN ASSISTANT

## 2024-09-22 PROCEDURE — 25000003 PHARM REV CODE 250: Performed by: PHYSICIAN ASSISTANT

## 2024-09-22 PROCEDURE — 87591 N.GONORRHOEAE DNA AMP PROB: CPT | Performed by: PHYSICIAN ASSISTANT

## 2024-09-22 PROCEDURE — 96360 HYDRATION IV INFUSION INIT: CPT

## 2024-09-22 PROCEDURE — 87210 SMEAR WET MOUNT SALINE/INK: CPT | Performed by: PHYSICIAN ASSISTANT

## 2024-09-22 PROCEDURE — 99284 EMERGENCY DEPT VISIT MOD MDM: CPT | Mod: 25

## 2024-09-22 PROCEDURE — 81025 URINE PREGNANCY TEST: CPT | Performed by: PHYSICIAN ASSISTANT

## 2024-09-22 PROCEDURE — 85025 COMPLETE CBC W/AUTO DIFF WBC: CPT | Performed by: PHYSICIAN ASSISTANT

## 2024-09-22 PROCEDURE — 86900 BLOOD TYPING SEROLOGIC ABO: CPT | Performed by: PHYSICIAN ASSISTANT

## 2024-09-22 PROCEDURE — 86901 BLOOD TYPING SEROLOGIC RH(D): CPT | Performed by: PHYSICIAN ASSISTANT

## 2024-09-22 PROCEDURE — 87491 CHLMYD TRACH DNA AMP PROBE: CPT | Performed by: PHYSICIAN ASSISTANT

## 2024-09-22 PROCEDURE — 81000 URINALYSIS NONAUTO W/SCOPE: CPT | Performed by: PHYSICIAN ASSISTANT

## 2024-09-22 PROCEDURE — 80053 COMPREHEN METABOLIC PANEL: CPT | Performed by: PHYSICIAN ASSISTANT

## 2024-09-22 RX ORDER — ACETAMINOPHEN 325 MG/1
650 TABLET ORAL
Status: COMPLETED | OUTPATIENT
Start: 2024-09-22 | End: 2024-09-22

## 2024-09-22 RX ADMIN — SODIUM CHLORIDE 1000 ML: 9 INJECTION, SOLUTION INTRAVENOUS at 10:09

## 2024-09-22 RX ADMIN — ACETAMINOPHEN 650 MG: 325 TABLET, FILM COATED ORAL at 10:09

## 2024-09-22 NOTE — Clinical Note
"Julius Trania" Sudhir was seen and treated in our emergency department on 9/22/2024.  She may return to work on 09/24/2024.       If you have any questions or concerns, please don't hesitate to call.      Ravinder Atkinson MD"

## 2024-09-23 VITALS
RESPIRATION RATE: 18 BRPM | WEIGHT: 121 LBS | OXYGEN SATURATION: 99 % | SYSTOLIC BLOOD PRESSURE: 105 MMHG | DIASTOLIC BLOOD PRESSURE: 62 MMHG | TEMPERATURE: 98 F | HEIGHT: 64 IN | HEART RATE: 80 BPM | BODY MASS INDEX: 20.66 KG/M2

## 2024-09-23 NOTE — ED PROVIDER NOTES
"     Source of History:  Patient and medical chart    Chief complaint:  Vaginal Bleeding (C/o right sided abdominal pain, vaginal bleeding and 9 weeks pregnant, )      HPI:  Julius Peguero is a 24 y.o. female presenting with lower abdominal pain.  Patient states that pain began earlier today.  States it waxes and wanes.  Describes it as a cramping.  Describes it to the right lower pelvic region.  Patient currently pregnant with last menstrual period on July 17th.  She has been seen for this pregnancy and had imaging however was told early pregnancy and no definite IUP at that time.  She does report some mild spotting.  She has not tried any medications for the symptoms.  Denies any fever chills or additional complaints.    This is the extent to the patients complaints today here in the emergency department.    ROS: As per HPI     Review of patient's allergies indicates:  No Known Allergies    PMH:  As per HPI and below:  History reviewed. No pertinent past medical history.  Past Surgical History:   Procedure Laterality Date    LAPAROSCOPIC SURGICAL REMOVAL OF CYST OF OVARY N/A 5/5/2022    Procedure: EXCISION, CYST, OVARY, LAPAROSCOPIC;  Surgeon: Alysia Lazo MD;  Location: [facility];  Service: OB/GYN;  Laterality: N/A;    MOUTH SURGERY         Social History     Tobacco Use    Smoking status: Never    Smokeless tobacco: Never   Substance Use Topics    Alcohol use: No    Drug use: Yes     Types: Marijuana       Physical Exam:    /62 (BP Location: Left arm, Patient Position: Sitting)   Pulse 80   Temp 98.2 °F (36.8 °C) (Oral)   Resp 18   Ht 5' 4" (1.626 m)   Wt 54.9 kg (121 lb)   SpO2 99%   Breastfeeding No   BMI 20.77 kg/m²   Nursing note and vital signs reviewed.  Constitutional: No acute distress.  Nontoxic  Eyes: No conjunctival injection.Extraocular muscles are intact.  ENT: Oropharynx clear.  Normal phonation.   Cardiovascular: Regular rate and rhythm.  No murmurs. No gallops. No " rubs  Respiratory: Clear to auscultation bilaterally.  Good air movement.  No wheezes.  No rhonchi. No rales. No accessory muscle use..  Abdomen: Soft.  Not distended.  Nontender.  No guarding.  No rebound. Non-peritoneal.  Musculoskeletal: Good range of motion all joints.  No deformities.  Neck supple.  No meningismus.  Skin: No rashes seen.  Good turgor.  No abrasions.  No ecchymoses.  Neurologic: Motor intact.  Sensation intact.  No ataxia. No focal neurological deficits.  Psych: Appropriate, conversant    Labs that have been ordered have been independently reviewed and interpreted by myself.    I decided to obtain the patient's medical records.      MDM/ Differential Dx:    Julius Peguero 24 y.o. presented to the ED with c/o vaginal bleeding in early pregnancy.  Physical exam reveals well-appearing female in no distress.  Abdomen is soft and nontender with no guarding, rebound or rigidity.  Skin free of pallor.    Differential Diagnosis includes, but is not limited to:  Pregnancy complication (threatened AB, inevitable AB, incomplete Ab, missed AB, uterine rupture, ectopic pregnancy, placental abruption, placenta previa), ovarian cyst/torsion, STD, foreign body, trauma, normal menstruation.      ED management:  Will plan for labs including hCG, Rh and ultrasound given lower pelvic pain, vaginal spotting in pregnancy of unknown location.  Labs reveal mild anemia..  HCG elevated at 264524.  Rh positive.  Slight reduced glucose at 62.  Will give food.  Urine with no overt signs of infection.  Ultrasound pending at time of handoff.    Impression/Plan: Patient informed of diagnosis  threatened miscarriage Discharged with outpatient followup. Patient will follow up with Primary.  Patient cautioned on when to return to ED.  Pt. Understands and agrees with current treatment plan      Results for orders placed or performed during the hospital encounter of 09/22/24   Vaginal Screen    Specimen: Vagina; Genital   Result  Value Ref Range    Trichomonas None None    Clue Cells Occasional (A) None    Budding Yeast None None    Fungal Hyphae None None    WBC - Vaginal Screen Few (A) None    Bacteria - Vaginal Screen Moderate (A) None    Wet Prep Source VAG    Urinalysis, Reflex to Urine Culture Urine, Clean Catch    Specimen: Urine, Clean Catch   Result Value Ref Range    Specimen UA Urine, Clean Catch     Color, UA Yellow Yellow, Straw, Sandra    Appearance, UA Clear Clear    pH, UA 7.0 5.0 - 8.0    Specific Gravity, UA >1.030 (A) 1.005 - 1.030    Protein, UA 1+ (A) Negative    Glucose, UA Negative Negative    Ketones, UA Trace (A) Negative    Bilirubin (UA) Negative Negative    Occult Blood UA Negative Negative    Nitrite, UA Negative Negative    Urobilinogen, UA >=8.0 (A) <2.0 EU/dL    Leukocytes, UA Negative Negative   CBC W/ AUTO DIFFERENTIAL   Result Value Ref Range    WBC 6.17 3.90 - 12.70 K/uL    RBC 3.45 (L) 4.00 - 5.40 M/uL    Hemoglobin 11.4 (L) 12.0 - 16.0 g/dL    Hematocrit 33.1 (L) 37.0 - 48.5 %    MCV 96 82 - 98 fL    MCH 33.0 (H) 27.0 - 31.0 pg    MCHC 34.4 32.0 - 36.0 g/dL    RDW 12.4 11.5 - 14.5 %    Platelets 263 150 - 450 K/uL    MPV 9.7 9.2 - 12.9 fL    Immature Granulocytes 0.2 0.0 - 0.5 %    Gran # (ANC) 3.0 1.8 - 7.7 K/uL    Immature Grans (Abs) 0.01 0.00 - 0.04 K/uL    Lymph # 2.2 1.0 - 4.8 K/uL    Mono # 0.8 0.3 - 1.0 K/uL    Eos # 0.1 0.0 - 0.5 K/uL    Baso # 0.05 0.00 - 0.20 K/uL    nRBC 0 0 /100 WBC    Gran % 49.0 38.0 - 73.0 %    Lymph % 35.3 18.0 - 48.0 %    Mono % 12.8 4.0 - 15.0 %    Eosinophil % 1.9 0.0 - 8.0 %    Basophil % 0.8 0.0 - 1.9 %    Differential Method Automated    Comp. Metabolic Panel   Result Value Ref Range    Sodium 134 (L) 136 - 145 mmol/L    Potassium 3.6 3.5 - 5.1 mmol/L    Chloride 104 95 - 110 mmol/L    CO2 22 (L) 23 - 29 mmol/L    Glucose 62 (L) 70 - 110 mg/dL    BUN 7 6 - 20 mg/dL    Creatinine 0.7 0.5 - 1.4 mg/dL    Calcium 8.6 (L) 8.7 - 10.5 mg/dL    Total Protein 6.8 6.0 - 8.4  g/dL    Albumin 3.7 3.5 - 5.2 g/dL    Total Bilirubin 0.3 0.1 - 1.0 mg/dL    Alkaline Phosphatase 40 (L) 55 - 135 U/L    AST 16 10 - 40 U/L    ALT 12 10 - 44 U/L    eGFR >60 >60 mL/min/1.73 m^2    Anion Gap 8 8 - 16 mmol/L   hCG, quantitative   Result Value Ref Range    HCG Quant [id number] See Text mIU/mL   Urinalysis Microscopic   Result Value Ref Range    RBC, UA 2 0 - 4 /hpf    WBC, UA 1 0 - 5 /hpf    Bacteria Occasional None-Occ /hpf    Squam Epithel, UA 6 /hpf    Hyaline Casts, UA 2 (A) 0-1/lpf /lpf    Microscopic Comment SEE COMMENT    POCT urine pregnancy   Result Value Ref Range    POC Preg Test, Ur Positive (A) Negative     Acceptable Yes    ABO/Rh   Result Value Ref Range    Group & Rh B POS      Imaging Results              US OB <14 Wks, TransAbd, Single Gestation (Final result)  Result time 09/22/24 23:43:18   Procedure changed from US OB <14 Wks TransAbd & TransVag, Single Gestation (XPD)     Final result by Logan Gomez MD (09/22/24 23:43:18)                   Impression:      Single intrauterine pregnancy which corresponds to a 9 weeks 2 days gestation by crown rump length. Fetal heart rate is 172 BPM.  YUNIEL by ultrasound 04/25/2025.      Electronically signed by: Logan Gomez MD  Date:    09/22/2024  Time:    23:43               Narrative:    EXAMINATION:  US OB <14 WEEKS TRANSABDOM, SINGLE GESTATION    CLINICAL HISTORY:  Vag Bleeding;    TECHNIQUE:  Transabdominal sonography of the pelvis was performed.    COMPARISON:  None.    FINDINGS:  The uterus measures 9 x 6 x 8 cm. Uterine parenchyma is heterogenous in echotexture. In the uterine cavity there is a gestational sac with a mean diameter of 3.6 cm. The fetus measures 2.5 cm by crown rump length and corresponds to a 9 weeks 2 days gestation. Fetal heart rate is 172 BPM. The yolk sac measures 0.5 cm.    The right ovary measures 3 x 3 x 3 cm. The left ovary measures 2 x 2 x 2 cm. Arterial and venous flow are preserved  bilaterally. A suspected corpus luteum cyst measuring 2 cm is seen in the right ovary. No significant free fluid is seen.                                                 Attending Attestation:     Physician Attestation Statement for NP/PA:   I personally made/approved the management plan and take responsibility for the patient management.    Other NP/PA Attestation Additions:    History of Present Illness: 25 yo with vaginal spotting post coitus, currently pregnant   Physical Exam: Well appearing.  Benign abdomen exam   Medical Decision Making: US shows IUP with cardiac activity.  Soft belly  Plan for OP followup and return in case of worsening of symptoms.  Hemodynamically stable           Diagnostic Impression:    1. Threatened miscarriage    2. 9 weeks gestation of pregnancy    3. Right upper quadrant abdominal pain         ED Disposition Condition    Discharge Stable            ED Prescriptions    None       Follow-up Information       Follow up With Specialties Details Why Contact Info    Alysia Lazo MD Obstetrics and Gynecology Schedule an appointment as soon as possible for a visit  As needed, For a follow up visit about today [address] [place] [id number]  [phone]               Ravinder Atkinson MD  09/23/24 [id number]

## 2024-09-23 NOTE — ED TRIAGE NOTES
"Pt reports to ED with vaginal bleeding described as "light" that started yesterday. Pt states she is about 9 weeks pregnant. Reports the bleeding is pink today. Also c/o right sided lower abdominal cramping and nausea that started today. VSS.   "

## 2024-09-24 LAB
C TRACH DNA SPEC QL NAA+PROBE: NOT DETECTED
N GONORRHOEA DNA SPEC QL NAA+PROBE: NOT DETECTED

## 2024-10-18 ENCOUNTER — OFFICE VISIT (OUTPATIENT)
Dept: OBSTETRICS AND GYNECOLOGY | Facility: CLINIC | Age: 24
End: 2024-10-18
Payer: MEDICAID

## 2024-10-18 ENCOUNTER — LAB VISIT (OUTPATIENT)
Dept: LAB | Facility: OTHER | Age: 24
End: 2024-10-18
Attending: OBSTETRICS & GYNECOLOGY
Payer: MEDICAID

## 2024-10-18 VITALS
DIASTOLIC BLOOD PRESSURE: 70 MMHG | HEIGHT: 64 IN | BODY MASS INDEX: 21.83 KG/M2 | WEIGHT: 127.88 LBS | SYSTOLIC BLOOD PRESSURE: 112 MMHG

## 2024-10-18 DIAGNOSIS — Z34.90 ENCOUNTER FOR SUPERVISION OF LOW-RISK PREGNANCY, ANTEPARTUM: ICD-10-CM

## 2024-10-18 DIAGNOSIS — Z3A.13 13 WEEKS GESTATION OF PREGNANCY: ICD-10-CM

## 2024-10-18 DIAGNOSIS — N91.2 AMENORRHEA: ICD-10-CM

## 2024-10-18 DIAGNOSIS — Z72.51 HIGH RISK HETEROSEXUAL BEHAVIOR: ICD-10-CM

## 2024-10-18 DIAGNOSIS — N91.2 AMENORRHEA: Primary | ICD-10-CM

## 2024-10-18 LAB
ABO + RH BLD: NORMAL
B-HCG UR QL: POSITIVE
BASOPHILS # BLD AUTO: 0.04 K/UL (ref 0–0.2)
BASOPHILS NFR BLD: 0.4 % (ref 0–1.9)
BLD GP AB SCN CELLS X3 SERPL QL: NORMAL
CTP QC/QA: YES
DIFFERENTIAL METHOD BLD: ABNORMAL
EOSINOPHIL # BLD AUTO: 0 K/UL (ref 0–0.5)
EOSINOPHIL NFR BLD: 0.2 % (ref 0–8)
ERYTHROCYTE [DISTWIDTH] IN BLOOD BY AUTOMATED COUNT: 12.9 % (ref 11.5–14.5)
HBV SURFACE AG SERPL QL IA: NORMAL
HCT VFR BLD AUTO: 34.8 % (ref 37–48.5)
HCV AB SERPL QL IA: NEGATIVE
HGB BLD-MCNC: 11.7 G/DL (ref 12–16)
HIV 1+2 AB+HIV1 P24 AG SERPL QL IA: NEGATIVE
IMM GRANULOCYTES # BLD AUTO: 0.03 K/UL (ref 0–0.04)
IMM GRANULOCYTES NFR BLD AUTO: 0.3 % (ref 0–0.5)
LYMPHOCYTES # BLD AUTO: 1.7 K/UL (ref 1–4.8)
LYMPHOCYTES NFR BLD: 18.6 % (ref 18–48)
MCH RBC QN AUTO: 33.1 PG (ref 27–31)
MCHC RBC AUTO-ENTMCNC: 33.6 G/DL (ref 32–36)
MCV RBC AUTO: 99 FL (ref 82–98)
MONOCYTES # BLD AUTO: 0.5 K/UL (ref 0.3–1)
MONOCYTES NFR BLD: 5.8 % (ref 4–15)
NEUTROPHILS # BLD AUTO: 6.7 K/UL (ref 1.8–7.7)
NEUTROPHILS NFR BLD: 74.7 % (ref 38–73)
NRBC BLD-RTO: 0 /100 WBC
PLATELET # BLD AUTO: 256 K/UL (ref 150–450)
PMV BLD AUTO: 10.2 FL (ref 9.2–12.9)
RBC # BLD AUTO: 3.53 M/UL (ref 4–5.4)
SPECIMEN OUTDATE: NORMAL
TREPONEMA PALLIDUM IGG+IGM AB [PRESENCE] IN SERUM OR PLASMA BY IMMUNOASSAY: NONREACTIVE
WBC # BLD AUTO: 9 K/UL (ref 3.9–12.7)

## 2024-10-18 PROCEDURE — 83020 HEMOGLOBIN ELECTROPHORESIS: CPT | Performed by: OBSTETRICS & GYNECOLOGY

## 2024-10-18 PROCEDURE — 86593 SYPHILIS TEST NON-TREP QUANT: CPT | Performed by: OBSTETRICS & GYNECOLOGY

## 2024-10-18 PROCEDURE — 36415 COLL VENOUS BLD VENIPUNCTURE: CPT | Performed by: OBSTETRICS & GYNECOLOGY

## 2024-10-18 PROCEDURE — 86762 RUBELLA ANTIBODY: CPT | Performed by: OBSTETRICS & GYNECOLOGY

## 2024-10-18 PROCEDURE — 87591 N.GONORRHOEAE DNA AMP PROB: CPT | Performed by: OBSTETRICS & GYNECOLOGY

## 2024-10-18 PROCEDURE — 85025 COMPLETE CBC W/AUTO DIFF WBC: CPT | Performed by: OBSTETRICS & GYNECOLOGY

## 2024-10-18 PROCEDURE — 86787 VARICELLA-ZOSTER ANTIBODY: CPT | Performed by: OBSTETRICS & GYNECOLOGY

## 2024-10-18 PROCEDURE — 83021 HEMOGLOBIN CHROMOTOGRAPHY: CPT | Performed by: OBSTETRICS & GYNECOLOGY

## 2024-10-18 PROCEDURE — 87389 HIV-1 AG W/HIV-1&-2 AB AG IA: CPT | Performed by: OBSTETRICS & GYNECOLOGY

## 2024-10-18 PROCEDURE — 86803 HEPATITIS C AB TEST: CPT | Performed by: OBSTETRICS & GYNECOLOGY

## 2024-10-18 PROCEDURE — 87340 HEPATITIS B SURFACE AG IA: CPT | Performed by: OBSTETRICS & GYNECOLOGY

## 2024-10-18 PROCEDURE — 99999 PR PBB SHADOW E&M-EST. PATIENT-LVL II: CPT | Mod: PBBFAC,,, | Performed by: OBSTETRICS & GYNECOLOGY

## 2024-10-18 PROCEDURE — 86901 BLOOD TYPING SEROLOGIC RH(D): CPT | Performed by: OBSTETRICS & GYNECOLOGY

## 2024-10-18 PROCEDURE — 87491 CHLMYD TRACH DNA AMP PROBE: CPT | Performed by: OBSTETRICS & GYNECOLOGY

## 2024-10-18 PROCEDURE — 86900 BLOOD TYPING SEROLOGIC ABO: CPT | Performed by: OBSTETRICS & GYNECOLOGY

## 2024-10-18 PROCEDURE — 0352U VAGINOSIS SCREEN BY DNA PROBE: CPT | Performed by: OBSTETRICS & GYNECOLOGY

## 2024-10-18 PROCEDURE — 86850 RBC ANTIBODY SCREEN: CPT | Performed by: OBSTETRICS & GYNECOLOGY

## 2024-10-18 PROCEDURE — 87086 URINE CULTURE/COLONY COUNT: CPT | Performed by: OBSTETRICS & GYNECOLOGY

## 2024-10-18 PROCEDURE — 99212 OFFICE O/P EST SF 10 MIN: CPT | Mod: PBBFAC,TH,25 | Performed by: OBSTETRICS & GYNECOLOGY

## 2024-10-18 RX ORDER — SWAB
1 SWAB, NON-MEDICATED MISCELLANEOUS DAILY
Qty: 30 TABLET | Refills: 9 | Status: SHIPPED | OUTPATIENT
Start: 2024-10-18 | End: 2025-08-14

## 2024-10-18 NOTE — PROGRESS NOTES
"Past medical, surgical, social, family, and obstetric histories; medications; prior records and results; and available outside records were reviewed and updated in the EMR.  Pertinent findings were noted below.    Reason for Visit   No chief complaint on file.    HPI   24 y.o. female     New patient: No. This pregnancy is unplanned. This pregnancy is desired. FOB minimally involved.  Seen in ER end of Sept for threatened miscarriage with VB - this has resolved    No LMP recorded. Patient is pregnant.    Nausea:  No  Vomiting: No  Cramping: No  Bleeding:  No    Family history of bleeding disorders, birth defects, or mental disability: DENIES  Complications with prior pregnancy: N/A    No Hx HSV  On no meds    Pap: 10/18/2022, NILM  Mammogram: N/A  Allergies: Patient has no known allergies.  OB History    Para Term  AB Living   1 0 0 0 0 0   SAB IAB Ectopic Multiple Live Births   0 0 0 0 0      # Outcome Date GA Lbr Mark/2nd Weight Sex Type Anes PTL Lv   1 Current                Exam   /70   Ht 5' 4" (1.626 m)   Wt 58 kg (127 lb 13.9 oz)   BMI 21.95 kg/m²     Physical Exam  Constitutional:       General: She is not in acute distress.     Appearance: Normal appearance. She is normal weight. She is not ill-appearing.     Genitourinary:    Vulva normal.   There is vaginal discharge in the vagina. Cervix is normal. HENT:      Head: Normocephalic and atraumatic.   Pulmonary:      Effort: Pulmonary effort is normal.   Musculoskeletal:         General: Tenderness: copious, homogenous. Normal range of motion.   Neurological:      General: No focal deficit present.      Mental Status: She is alert and oriented to person, place, and time.   Psychiatric:         Mood and Affect: Mood normal.         Behavior: Behavior normal.         Thought Content: Thought content normal.         Judgment: Judgment normal.   Vitals reviewed.     +FHTs 150s  Assessment and Plan   Amenorrhea  -     POCT urine " pregnancy  -     Urine culture  -     Type & Screen; Future; Expected date: 10/18/2024  -     CBC Auto Differential; Future; Expected date: 10/18/2024  -     Hepatitis B Surface Antigen; Future; Expected date: 10/18/2024  -     HIV 1/2 Ag/Ab (4th Gen); Future; Expected date: 10/18/2024  -     Hepatitis C Antibody; Future; Expected date: 10/18/2024  -     Varicella Zoster Antibody, IgG; Future; Expected date: 10/18/2024  -     Rubella Antibody, IgG; Future; Expected date: 10/18/2024  -     Hemoglobin Electrophoresis,Hgb A2 Danny.; Future; Expected date: 10/18/2024  -     Treponema Pallidium Antibodies IgG, IgM; Future; Expected date: 10/18/2024    High risk heterosexual behavior  -     C. trachomatis/N. gonorrhoeae by AMP DNA  -     Vaginosis Screen by DNA Probe    13 weeks gestation of pregnancy  -     [id number] Plus W/ Sca* T21, T18, T13 & Y + X; Future; Expected date: 10/18/2024    Encounter for supervision of low-risk pregnancy, antepartum  -     [id number] Plus W/ Sca* T21, T18, T13 & Y + X; Future; Expected date: 10/18/2024  -     prenatal vit-iron fum-folic ac 28 mg iron- 800 mcg Tab; Take 1 tablet by mouth once daily.  Dispense: 30 tablet; Refill: 9        UPT positive  Dating  LMP=? --> YUNIEL=4/25/2025 --> EGA=13w0d  Dating u/s already done in ER at 9wk  Initial prenatal labs ordered  Aneuploidy screening: desires Mat 21  PNV Rx sent  Dos and Donts discussed    NEW PREGNANCY COUNSELING  Patient was counseled today on:  - Routine prenatal blood tests including HIV and anticipated course of prenatal care  - Prenatal vitamins and folic acid  - Weight gain, nutrition, and exercise  - Seafood and mercury  - Properly heating deli and prepared meats and avoiding unrefrigerated deli  meats, cheeses, and milk products,   - Avoiding cat litter and raw meats due to risk of Toxoplasmosis precautions   - Accuracy of the LMP-based YUNIEL and the value of an early TV-u/s  - Aneuploidy and neural tube screening -- cffDNA,  sequential screening, and AFP screen at 15 weeks  - OTC medication in the first trimester  - Harmful effects of smoking, etOH, and recreational drugs  - MFM u/s  at 18-20 weeks.  - Common complaints of pregnancy  - Seat belt use  - Childbirth classes and hospital facilities  - All questions were answered    - Pain and bleeding precautions given    - Return to clinic in 4 weeks    Total time of 35 minutes, including face-to-face time and non-face-to-face time preparing to see the patient (eg, review of tests), obtaining and/or reviewing separately obtained history, documenting clinical information in the electronic or other health record, independently interpreting results, communicating results to the patient/family/caregiver, or care coordination.

## 2024-10-19 LAB
VARICELLA INTERPRETATION: NORMAL
VARICELLA ZOSTER IGG: 146

## 2024-10-21 DIAGNOSIS — N76.0 BV (BACTERIAL VAGINOSIS): Primary | ICD-10-CM

## 2024-10-21 DIAGNOSIS — B96.89 BV (BACTERIAL VAGINOSIS): Primary | ICD-10-CM

## 2024-10-21 LAB
HGB A2 MFR BLD HPLC: 2.5 % (ref 2.2–3.2)
HGB FRACT BLD ELPH-IMP: NORMAL
HGB FRACT BLD ELPH-IMP: NORMAL
RUBV IGG SER-ACNC: 50 IU/ML
RUBV IGG SER-IMP: REACTIVE

## 2024-10-21 RX ORDER — METRONIDAZOLE 500 MG/1
500 TABLET ORAL EVERY 12 HOURS
Qty: 14 TABLET | Refills: 0 | Status: SHIPPED | OUTPATIENT
Start: 2024-10-21 | End: 2024-10-28

## 2024-10-25 ENCOUNTER — TELEPHONE (OUTPATIENT)
Dept: OBSTETRICS AND GYNECOLOGY | Facility: CLINIC | Age: 24
End: 2024-10-25
Payer: MEDICAID

## 2024-10-25 LAB
ABOUT THE TEST: NORMAL
APPROVED BY: NORMAL
FETAL FRACTION: NORMAL
FETAL SEX RESULT: NORMAL
GESTATIONAL AGE > 9: YES
GESTATIONAL AGE: NORMAL
LAB DIRECTOR COMMENTS: NORMAL
LIMITATIONS:: NORMAL
MONOSOMY X RESULT: NOT DETECTED
NEGATIVE PREDICTIVE VALUE: NORMAL
NOTE: NORMAL
PERFORMANCE CHARACTERISTICS: NORMAL
PERFORMANCE: NORMAL
POSITIVE PREDICTIVE VALUE: NORMAL
RESULT: NEGATIVE
SERVICE CMNT 04-IMP: NORMAL
TEST METHODOLOGY:: NORMAL
TRISOMY 13 (T13): NEGATIVE
TRISOMY 18: NEGATIVE
TRISOMY 21 (T21): NEGATIVE
XXX (TRIPLE X SYNDROME): NOT DETECTED
XXY (KLINEFELTER SYNDROME): NOT DETECTED
XYY (JACOBS SYNDROME): NOT DETECTED

## 2024-11-08 ENCOUNTER — HOSPITAL ENCOUNTER (EMERGENCY)
Facility: OTHER | Age: 24
Discharge: HOME | End: 2024-11-08
Attending: OBSTETRICS & GYNECOLOGY
Payer: MEDICAID

## 2024-11-08 VITALS
BODY MASS INDEX: 22.71 KG/M2 | RESPIRATION RATE: 16 BRPM | SYSTOLIC BLOOD PRESSURE: 135 MMHG | DIASTOLIC BLOOD PRESSURE: 76 MMHG | OXYGEN SATURATION: 100 % | HEIGHT: 64 IN | HEART RATE: 87 BPM | WEIGHT: 133 LBS | TEMPERATURE: 98 F

## 2024-11-08 DIAGNOSIS — B37.9 YEAST INFECTION: ICD-10-CM

## 2024-11-08 DIAGNOSIS — K59.00 CONSTIPATION, UNSPECIFIED CONSTIPATION TYPE: Primary | ICD-10-CM

## 2024-11-08 DIAGNOSIS — Z3A.16 16 WEEKS GESTATION OF PREGNANCY: ICD-10-CM

## 2024-11-08 LAB
BACTERIA HYPHAE, POC: POSITIVE
BILIRUBIN, POC UA: NEGATIVE
BLOOD, POC UA: NEGATIVE
CLARITY, UA: CLEAR
COLOR, UA: YELLOW
GARDNERELLA VAGINALIS: NEGATIVE
GLUCOSE, POC UA: NEGATIVE
KETONES, POC UA: NEGATIVE
LEUKOCYTE EST, POC UA: ABNORMAL
NITRITE, POC UA: NEGATIVE
OTHER MICROSC. OBSERVATIONS: ABNORMAL
PH UR STRIP: 7 [PH] (ref 5–8)
POC BACTERIAL VAGINOSIS: NEGATIVE
POC CLUE CELLS: NEGATIVE
PROTEIN, POC UA: NEGATIVE
SPECIFIC GRAVITY, POC UA: 1.02 (ref 1–1.03)
TRICHOMONAS, POC: NEGATIVE
UROBILINOGEN, POC UA: 0.2 E.U./DL
YEAST WET PREP: POSITIVE
YEAST, POC: POSITIVE

## 2024-11-08 PROCEDURE — 99284 EMERGENCY DEPT VISIT MOD MDM: CPT

## 2024-11-08 PROCEDURE — 99284 EMERGENCY DEPT VISIT MOD MDM: CPT | Mod: ,,, | Performed by: OBSTETRICS & GYNECOLOGY

## 2024-11-08 PROCEDURE — 25000003 PHARM REV CODE 250

## 2024-11-08 RX ORDER — ACETAMINOPHEN 500 MG
1000 TABLET ORAL ONCE
Status: COMPLETED | OUTPATIENT
Start: 2024-11-08 | End: 2024-11-08

## 2024-11-08 RX ORDER — TERCONAZOLE 4 MG/G
1 CREAM VAGINAL NIGHTLY
Qty: 7 G | Refills: 0 | Status: SHIPPED | OUTPATIENT
Start: 2024-11-08

## 2024-11-08 RX ADMIN — Medication 1 ENEMA: at 10:11

## 2024-11-08 RX ADMIN — ACETAMINOPHEN 1000 MG: 500 TABLET ORAL at 10:11

## 2024-11-08 NOTE — ED PROVIDER NOTES
Encounter Date: 2024       History     Chief Complaint   Patient presents with    Back Pain     Pt reports may back pain with urinary retention. She states she has not been able to urinate since 0500 this am and has not had a BM in two days.     Abdominal Pain     Pt reports lower abd pain since this am. Denies bleeding.      23 yo  at 16w0d presents c/o LLQ abdominal pain since yesterday. Pain described as 10/10 cramping. Denies n/v/d. States she has not urinated since last night at 2200 and experiences pain when she tries to urinate or defecate. Last BM 2 days ago and was normal. She has been passing gas normally. Patient denies abnormal discharge. She was treated for BV at the end of October and reports that she took the full treatment of Flagyl.      This IUP is uncomplicated.  Patient denies contractions, denies vaginal bleeding, denies LOF.   Fetal Movement: normal         Review of patient's allergies indicates:  No Known Allergies  No past medical history on file.  Past Surgical History:   Procedure Laterality Date    LAPAROSCOPIC SURGICAL REMOVAL OF CYST OF OVARY N/A 2022    Procedure: EXCISION, CYST, OVARY, LAPAROSCOPIC;  Surgeon: Alysia Lazo MD;  Location: [facility];  Service: OB/GYN;  Laterality: N/A;    MOUTH SURGERY       Family History   Problem Relation Name Age of Onset    No Known Problems Paternal Grandmother      No Known Problems Maternal Grandfather      No Known Problems Mother      No Known Problems Sister      Miscarriages / Stillbirths Neg Hx       labor Neg Hx      Stroke Neg Hx      Colon cancer Neg Hx       Social History     Tobacco Use    Smoking status: Never    Smokeless tobacco: Never   Substance Use Topics    Alcohol use: No    Drug use: Yes     Types: Marijuana     Review of Systems   Constitutional:  Negative for chills and fever.   HENT:  Negative for sore throat.    Eyes:  Negative for visual disturbance.   Respiratory:  Negative for shortness of  breath.    Cardiovascular:  Negative for chest pain and leg swelling.   Gastrointestinal:  Positive for abdominal pain (cramping 10/10). Negative for nausea and vomiting.   Genitourinary:  Positive for difficulty urinating. Negative for dysuria and vaginal bleeding.   Musculoskeletal:  Negative for back pain.   Skin:  Negative for rash.   Neurological:  Negative for weakness and headaches.   Hematological:  Does not bruise/bleed easily.   Psychiatric/Behavioral: Negative.         Physical Exam     Initial Vitals [11/08/24 0921]   BP Pulse Resp Temp SpO2   129/72 100 18 98.1 °F (36.7 °C) 100 %      MAP       --         Physical Exam    Vitals reviewed.  Constitutional: She appears well-developed and well-nourished. She is not diaphoretic. No distress.   HENT:   Head: Normocephalic and atraumatic.   Eyes: EOM are normal.   Cardiovascular:  Normal rate.           Pulmonary/Chest: No respiratory distress.   Abdominal: Abdomen is soft. She exhibits no distension. There is no abdominal tenderness.   Gravid habitus, LLQ discomfort to palpation There is no rebound.   Genitourinary:    Vaginal discharge present.     Musculoskeletal:         General: Normal range of motion.     Neurological: She is alert and oriented to person, place, and time. No cranial nerve deficit.   Skin: Skin is warm and dry.   Psychiatric: She has a normal mood and affect. Her behavior is normal. Judgment and thought content normal.     OB LABOR EXAM:     Membranes ruptured: No.   Method: Sterile vaginal exam per MD and Sterile speculum exam per MD.   Vaginal Bleeding: none present.     Dilatation: 0.       Amniotic Fluid Color: no fluid.   Amniotic Fluid Amount: none noted.         ED Course   Procedures  Labs Reviewed   POCT URINALYSIS W/O SCOPE - Abnormal       Result Value    Spec Grav UA 1.025      PH, UA 7.0      Protein, UA Negative      Glucose, UA Negative      Ketones, UA Negative      Bilirubin, UA Negative      Blood, UA Negative       Leukocytes, UA Trace (*)     Nitrite, UA Negative      Urobilinogen, UA 0.2      Color, UA POC Yellow      Clarity, UA, POC Clear     POCT WET PREP - Abnormal    Yeast Wet Prep Positive (*)     Gardnerella vaginalis Negative      Trichomonas, UA Negative      Other Microsc. Observations        Clue Cells, POC Negative      POC Bacterial Vaginosis Negative     POCT KOH - Abnormal    Yeast, UA Positive (*)     Bacteria Hyphae, POC Positive (*)           Imaging Results    None          Medications   acetaminophen tablet 1,000 mg (1,000 mg Oral Given 24 1022)   sodium phosphates 19-7 gram/118 mL enema 1 enema (1 enema Rectal Given 24 1029)     Medical Decision Making  23 yo  at 16w0d presents c/o LLQ abdominal pain and constipation.     FHT: 155  [place]: no irritability/contractions  Temp:  [98.1 °F (36.7 °C)-98.4 °F (36.9 °C)] 98.4 °F (36.9 °C)  Pulse:  [] 87  Resp:  [16-18] 16  SpO2:  [100 %] 100 %  BP: (120-135)/(72-78) 135/76     Abdominal Pain/Constipation  - Patient reports cramping abdominal pain since yesterday and constipation  - Last BM was 2 days ago and she feels as though there is hard stool in rectum that she is unable to pass with straining  - VSS  -   - [place] without irritability/CTX  - Discomfort to palpation LLQ  - SSE: cervix visually closed, thick white discharge   - SVE: cervix closed, posterior firm mass palpated in rectum, consistent with constipation  - Udip WNL  - 1g Tylenol given for analgesia  - Enema given in [facility] and patient had bowel movement with significant relief in abdominal pain  - Recommended Miralax OTC for constipation    Yeast Infection  - SSE w/ thick white discharge   - Denies any itching/discomfort  - Yeast on wet prep/KOH  - No clue cells visualized on wet prep  - Terconazole sent to pharmacy for yeast infection treatment    Monet Burnette MD  Obstetrics & Gynecology, PGY-1    Risk  OTC drugs.  Prescription drug management.              Attending  Attestation:   Physician Attestation Statement for Resident:  As the supervising MD   Physician Attestation Statement: I have personally seen and examined this patient.   I agree with the above history.  -:   As the supervising MD I agree with the above PE.     As the supervising MD I agree with the above treatment, course, plan, and disposition.   -:     FHTs normal, VSS.  No contractions noted on toco.  Exam not c/w labor or incompetent cervix.  Constipation noted on exam.  Enema given with relief in symptoms.  Yeast infection noted on wet prep.  Rx sent to pharmacy.  Agree with discharge to home.  F/U with OB in 1 week.  Miralax at home to prevent constipation.    Lyubov Haq MD,BLANE  Date and Time: 11/09/2024 5:35 AM  OB Hospitalist        I was personally present during the critical portions of the procedure(s) performed by the resident and was immediately available in the ED to provide services and assistance as needed during the entire procedure.   I have reviewed the following: old records at this facility.                ED Course as of 11/09/24 0535   Sat Nov 09, 2024 0528 Leukocytes, UA(!): Trace [CD]   0528 BP: 129/72 [CD]      ED Course User Index  [CD] Lyubov Haq MD                           Clinical Impression:  Final diagnoses:  [K59.00] Constipation, unspecified constipation type (Primary)  [B37.9] Yeast infection  [Z3A.16] 16 weeks gestation of pregnancy          ED Disposition Condition    Discharge Stable          ED Prescriptions       Medication Sig Dispense Start Date End Date Auth. Provider    terconazole (TERAZOL 7) 0.4 % Crea Place 1 applicator vaginally every evening. 7 g 11/8/2024 -- Monet Burnette MD          Follow-up Information    None          Monet Burnette MD  Resident  11/08/24 1656       Monet Burnette MD  Resident  11/08/24 1657       Monet Burnette MD  Resident  11/08/24 1703       Lyubov Haq MD  11/09/24 0520

## 2024-11-08 NOTE — DISCHARGE INSTRUCTIONS
Call clinic [phone] or L & D after hours at [phone] for vaginal bleeding, leakage of fluids, contractions 4-5 in one hour, decreased fetal movements ( 10 kicks in 2 hours), headache not relieved by Tylenol, blurry vision, or temp of 100.4 or greater.  Begin doing fetal kick counts, at least 10 movements in 2 hours starting at 28 weeks gestation.  Keep next clinic appointment

## 2024-11-18 ENCOUNTER — PATIENT MESSAGE (OUTPATIENT)
Dept: ADMINISTRATIVE | Facility: OTHER | Age: 24
End: 2024-11-18
Payer: MEDICAID

## 2024-11-18 ENCOUNTER — ROUTINE PRENATAL (OUTPATIENT)
Dept: OBSTETRICS AND GYNECOLOGY | Facility: CLINIC | Age: 24
End: 2024-11-18
Attending: OBSTETRICS & GYNECOLOGY
Payer: MEDICAID

## 2024-11-18 ENCOUNTER — PATIENT MESSAGE (OUTPATIENT)
Dept: MATERNAL FETAL MEDICINE | Facility: CLINIC | Age: 24
End: 2024-11-18
Payer: MEDICAID

## 2024-11-18 VITALS — WEIGHT: 134.5 LBS | BODY MASS INDEX: 23.08 KG/M2 | DIASTOLIC BLOOD PRESSURE: 64 MMHG | SYSTOLIC BLOOD PRESSURE: 110 MMHG

## 2024-11-18 DIAGNOSIS — Z34.90 ENCOUNTER FOR SUPERVISION OF LOW-RISK PREGNANCY, ANTEPARTUM: Primary | ICD-10-CM

## 2024-11-18 PROCEDURE — 99212 OFFICE O/P EST SF 10 MIN: CPT | Mod: PBBFAC,TH | Performed by: OBSTETRICS & GYNECOLOGY

## 2024-11-18 PROCEDURE — 99213 OFFICE O/P EST LOW 20 MIN: CPT | Mod: TH,S$PBB,, | Performed by: OBSTETRICS & GYNECOLOGY

## 2024-11-18 PROCEDURE — 99999 PR PBB SHADOW E&M-EST. PATIENT-LVL II: CPT | Mod: PBBFAC,,, | Performed by: OBSTETRICS & GYNECOLOGY

## 2024-11-18 NOTE — PROGRESS NOTES
Pregnancy dating, labs, ultrasound reports, prenatal testing, and problem list; prior records and results; and available outside records were reviewed and updated in EMR.  Pertinent findings were noted below.    Reason for Visit   Routine Prenatal Visit    HPI   24 y.o., at 17w3d by Estimated Date of Delivery: 4/25/25    Went to [facility] with constipation and yeast infection (treated for both)    Cramping: No   Bleeding: No   Vaginal discharge: Almost done with cream, sxs have improved   Fetal movement: Yes   Nausea: No   Vomiting: No   Headache: Sometimes, resolves on own     Exam   /64   Wt 61 kg (134 lb 7.7 oz)   BMI 23.08 kg/m²     GENERAL: No acute distress  ABD: Gravid < umbilicus    Assessment and Plan   Encounter for supervision of low-risk pregnancy, antepartum  -     Connected MOM Enrollment  -     Assign Connected MOM Program Consent Questionnaire  -     US MFM Procedure ([facility]); Future; Expected date: 11/25/2024      Anatomy US ordered   Conn mom d/w patient    SAB precautions given  Follow-up: 4 weeks

## 2024-12-02 ENCOUNTER — PROCEDURE VISIT (OUTPATIENT)
Dept: MATERNAL FETAL MEDICINE | Facility: CLINIC | Age: 24
End: 2024-12-02
Payer: MEDICAID

## 2024-12-02 DIAGNOSIS — Z34.90 ENCOUNTER FOR SUPERVISION OF LOW-RISK PREGNANCY, ANTEPARTUM: ICD-10-CM

## 2024-12-02 PROCEDURE — 76811 OB US DETAILED SNGL FETUS: CPT | Mod: PBBFAC | Performed by: OBSTETRICS & GYNECOLOGY

## 2024-12-06 ENCOUNTER — PATIENT MESSAGE (OUTPATIENT)
Dept: OTHER | Facility: OTHER | Age: 24
End: 2024-12-06
Payer: MEDICAID

## 2024-12-16 ENCOUNTER — ROUTINE PRENATAL (OUTPATIENT)
Dept: OBSTETRICS AND GYNECOLOGY | Facility: CLINIC | Age: 24
End: 2024-12-16
Payer: MEDICAID

## 2024-12-16 VITALS
BODY MASS INDEX: 25.09 KG/M2 | SYSTOLIC BLOOD PRESSURE: 102 MMHG | WEIGHT: 146.19 LBS | DIASTOLIC BLOOD PRESSURE: 60 MMHG

## 2024-12-16 DIAGNOSIS — Z3A.21 21 WEEKS GESTATION OF PREGNANCY: Primary | ICD-10-CM

## 2024-12-16 DIAGNOSIS — Z34.90 ENCOUNTER FOR SUPERVISION OF LOW-RISK PREGNANCY, ANTEPARTUM: ICD-10-CM

## 2024-12-16 PROCEDURE — 99999 PR PBB SHADOW E&M-EST. PATIENT-LVL III: CPT | Mod: PBBFAC,,, | Performed by: FAMILY MEDICINE

## 2024-12-16 PROCEDURE — 99213 OFFICE O/P EST LOW 20 MIN: CPT | Mod: PBBFAC,TH | Performed by: FAMILY MEDICINE

## 2024-12-16 PROCEDURE — 99213 OFFICE O/P EST LOW 20 MIN: CPT | Mod: TH,S$PBB,, | Performed by: FAMILY MEDICINE

## 2024-12-16 NOTE — PROGRESS NOTES
Reason for visit: Routine Prenatal Visit      HPI:   24 y.o., at 21w3d by Estimated Date of Delivery: 25    Will do glucose with next visit    - Contractions: none  - Bleeding: none  - Loss of fluid: none  - Fetal movement: present  - Nausea: none  - Vomiting: none  - Headache: mild int asx with it, bp normal. Resolve on their own      Reviewed:    Past medical, surgical, social, family, and obstetric history: Reviewed and updated in EMR.  Medications: Reviewed and updated in EMR.  Allergies: Patient has no known allergies.    Pregnancy dating, labs, ultrasound reports, prenatal testing, and problem list: Reviewed and updated in EMR.  Outside records: na  Independent interpretation of tests: na  Discussion with another healthcare professional: na      Vitals: /60   Wt 66.3 kg (146 lb 2.6 oz)   BMI 25.09 kg/m²     Physical exam:  GENERAL: No acute distress  ABD: Gravid      Assessment and Plan:    21 weeks gestation of pregnancy    Encounter for supervision of low-risk pregnancy, antepartum  -     CBC Auto Differential; Future; Expected date: 2024  -     OB Glucose Screen; Future; Expected date: 2024           labor precautions given  Follow-up: 4 weeks      I spent a total of 20 minutes on the day of the visit. This includes face to face time and non-face to face time preparing to see the patient (eg, review of tests), Obtaining and/or reviewing separately obtained history, Documenting clinical information in the electronic or other health record, Independently interpreting results and communicating results to the patient/family/caregiver, or Care coordination.

## 2024-12-16 NOTE — PATIENT INSTRUCTIONS
LABOR AND DELIVERY PHONE NUMBER, [phone] (OPEN 24/7, LOCATED ON 6TH FLOOR OF HOSPITAL)  [address] PHONE NUMBER, [phone] (OPEN MON-FRI, 8a-5p)

## 2025-01-03 ENCOUNTER — PATIENT MESSAGE (OUTPATIENT)
Dept: OTHER | Facility: OTHER | Age: 25
End: 2025-01-03
Payer: MEDICAID

## 2025-01-10 ENCOUNTER — PATIENT MESSAGE (OUTPATIENT)
Dept: OBSTETRICS AND GYNECOLOGY | Facility: CLINIC | Age: 25
End: 2025-01-10
Payer: MEDICAID

## 2025-01-13 ENCOUNTER — PROCEDURE VISIT (OUTPATIENT)
Dept: OBSTETRICS AND GYNECOLOGY | Facility: CLINIC | Age: 25
End: 2025-01-13
Payer: MEDICAID

## 2025-01-13 ENCOUNTER — ROUTINE PRENATAL (OUTPATIENT)
Dept: OBSTETRICS AND GYNECOLOGY | Facility: CLINIC | Age: 25
End: 2025-01-13
Payer: MEDICAID

## 2025-01-13 VITALS
WEIGHT: 155.19 LBS | SYSTOLIC BLOOD PRESSURE: 102 MMHG | DIASTOLIC BLOOD PRESSURE: 62 MMHG | BODY MASS INDEX: 26.64 KG/M2

## 2025-01-13 DIAGNOSIS — Z34.90 ENCOUNTER FOR SUPERVISION OF LOW-RISK PREGNANCY, ANTEPARTUM: ICD-10-CM

## 2025-01-13 DIAGNOSIS — Z34.80 SUPERVISION OF OTHER NORMAL PREGNANCY, ANTEPARTUM: Primary | ICD-10-CM

## 2025-01-13 LAB
BASOPHILS # BLD AUTO: 0.03 K/UL (ref 0–0.2)
BASOPHILS NFR BLD: 0.4 % (ref 0–1.9)
DIFFERENTIAL METHOD BLD: ABNORMAL
EOSINOPHIL # BLD AUTO: 0 K/UL (ref 0–0.5)
EOSINOPHIL NFR BLD: 0.5 % (ref 0–8)
ERYTHROCYTE [DISTWIDTH] IN BLOOD BY AUTOMATED COUNT: 12.2 % (ref 11.5–14.5)
GLUCOSE SERPL-MCNC: 125 MG/DL (ref 70–140)
HCT VFR BLD AUTO: 34.9 % (ref 37–48.5)
HGB BLD-MCNC: 11.4 G/DL (ref 12–16)
IMM GRANULOCYTES # BLD AUTO: 0.05 K/UL (ref 0–0.04)
IMM GRANULOCYTES NFR BLD AUTO: 0.6 % (ref 0–0.5)
LYMPHOCYTES # BLD AUTO: 1.5 K/UL (ref 1–4.8)
LYMPHOCYTES NFR BLD: 18.8 % (ref 18–48)
MCH RBC QN AUTO: 32.3 PG (ref 27–31)
MCHC RBC AUTO-ENTMCNC: 32.7 G/DL (ref 32–36)
MCV RBC AUTO: 99 FL (ref 82–98)
MONOCYTES # BLD AUTO: 0.6 K/UL (ref 0.3–1)
MONOCYTES NFR BLD: 7.5 % (ref 4–15)
NEUTROPHILS # BLD AUTO: 5.7 K/UL (ref 1.8–7.7)
NEUTROPHILS NFR BLD: 72.2 % (ref 38–73)
NRBC BLD-RTO: 0 /100 WBC
PLATELET # BLD AUTO: 249 K/UL (ref 150–450)
PMV BLD AUTO: 10.9 FL (ref 9.2–12.9)
RBC # BLD AUTO: 3.53 M/UL (ref 4–5.4)
WBC # BLD AUTO: 7.88 K/UL (ref 3.9–12.7)

## 2025-01-13 PROCEDURE — 99999 PR PBB SHADOW E&M-EST. PATIENT-LVL II: CPT | Mod: PBBFAC,,, | Performed by: ADVANCED PRACTICE MIDWIFE

## 2025-01-13 PROCEDURE — 82950 GLUCOSE TEST: CPT | Performed by: FAMILY MEDICINE

## 2025-01-13 PROCEDURE — 85025 COMPLETE CBC W/AUTO DIFF WBC: CPT | Performed by: FAMILY MEDICINE

## 2025-01-13 PROCEDURE — 99213 OFFICE O/P EST LOW 20 MIN: CPT | Mod: TH,S$PBB,UC, | Performed by: ADVANCED PRACTICE MIDWIFE

## 2025-01-13 PROCEDURE — 99212 OFFICE O/P EST SF 10 MIN: CPT | Mod: PBBFAC,TH | Performed by: ADVANCED PRACTICE MIDWIFE

## 2025-01-13 NOTE — PROGRESS NOTES
Reason for visit: Routine Prenatal Visit      HPI:   24 y.o., at 25w3d by Estimated Date of Delivery: 25    In with no c/o    - Contractions: denies  - Bleeding: denies  - Loss of fluid: denies  - Fetal movement: reports good FM, reinforced BID  - Nausea: no  - Vomiting: no  - Headache: no      Reviewed:    Past medical, surgical, social, family, and obstetric history: Reviewed and updated in EMR.  Medications: Reviewed and updated in EMR.  Allergies: Patient has no known allergies.    Pregnancy dating, labs, ultrasound reports, prenatal testing, and problem list: Reviewed and updated in EMR.  Outside records: na  Independent interpretation of tests: na  Discussion with another healthcare professional: na      Vitals: /62   Wt 70.4 kg (155 lb 3.3 oz)   BMI 26.64 kg/m²     Physical exam:  GENERAL: No acute distress  ABD: Gravid      Assessment and Plan:    Supervision of other normal pregnancy, antepartum        Declines flu vaccine, info r/t RSV and TDAP provided  DM screen done today     labor precautions given  Follow-up: 4 weeks      I spent a total of 20 minutes on the day of the visit. This includes face to face time and non-face to face time preparing to see the patient (eg, review of tests), Obtaining and/or reviewing separately obtained history, Documenting clinical information in the electronic or other health record, Independently interpreting results and communicating results to the patient/family/caregiver, or Care coordination.

## 2025-01-17 ENCOUNTER — PATIENT MESSAGE (OUTPATIENT)
Dept: OTHER | Facility: OTHER | Age: 25
End: 2025-01-17
Payer: MEDICAID

## 2025-01-31 ENCOUNTER — PATIENT MESSAGE (OUTPATIENT)
Dept: OTHER | Facility: OTHER | Age: 25
End: 2025-01-31
Payer: MEDICAID

## 2025-02-09 NOTE — PROGRESS NOTES
Pregnancy dating, labs, ultrasound reports, prenatal testing, and problem list; prior records and results; and available outside records were reviewed and updated in EMR.  Pertinent findings were noted below.    Reason for Visit   Routine Prenatal Visit    HPI   24 y.o., at 29w3d by Estimated Date of Delivery: 25    Contractions: No   Bleeding: No   Loss of fluid: No   Fetal movement: Yes   Nausea: No   Vomiting: No   Headache: Sometimes, resolves on own     Exam   /68   Pulse 88   Wt 74 kg (163 lb 2.3 oz)   BMI 28.00 kg/m²     TW#  GENERAL: No acute distress  ABD: Gravid    Assessment and Plan   Encounter for supervision of low-risk pregnancy, antepartum  -     Tdap (BOOSTRIX) vaccine injection 0.5 mL  -     HIV 1/2 Ag/Ab (4th Gen); Future; Expected date: 02/10/2025  -     CBC Auto Differential; Future; Expected date: 02/10/2025  -     Treponema Pallidium Antibodies IgG, IgM; Future; Expected date: 02/10/2025    Excessive weight gain during pregnancy, antepartum    Birth control counseling      3T labs today  Tdap administered today  Discussed weight gain - given dietary recommendations and walking 5x/week for 30 minutes  Given written information on birth control options, considering ring vs IUD     labor, Pain and bleeding, and fetal movement count precautions given  Follow-up: 2 weeks

## 2025-02-10 ENCOUNTER — LAB VISIT (OUTPATIENT)
Dept: LAB | Facility: OTHER | Age: 25
End: 2025-02-10
Attending: OBSTETRICS & GYNECOLOGY
Payer: MEDICAID

## 2025-02-10 ENCOUNTER — ROUTINE PRENATAL (OUTPATIENT)
Dept: OBSTETRICS AND GYNECOLOGY | Facility: CLINIC | Age: 25
End: 2025-02-10
Payer: MEDICAID

## 2025-02-10 VITALS
BODY MASS INDEX: 28 KG/M2 | DIASTOLIC BLOOD PRESSURE: 68 MMHG | SYSTOLIC BLOOD PRESSURE: 106 MMHG | WEIGHT: 163.13 LBS | HEART RATE: 88 BPM

## 2025-02-10 DIAGNOSIS — O26.00 EXCESSIVE WEIGHT GAIN DURING PREGNANCY, ANTEPARTUM: ICD-10-CM

## 2025-02-10 DIAGNOSIS — Z30.09 BIRTH CONTROL COUNSELING: ICD-10-CM

## 2025-02-10 DIAGNOSIS — Z34.90 ENCOUNTER FOR SUPERVISION OF LOW-RISK PREGNANCY, ANTEPARTUM: Primary | ICD-10-CM

## 2025-02-10 DIAGNOSIS — Z34.90 ENCOUNTER FOR SUPERVISION OF LOW-RISK PREGNANCY, ANTEPARTUM: ICD-10-CM

## 2025-02-10 LAB
BASOPHILS # BLD AUTO: 0.02 K/UL (ref 0–0.2)
BASOPHILS NFR BLD: 0.2 % (ref 0–1.9)
DIFFERENTIAL METHOD BLD: ABNORMAL
EOSINOPHIL # BLD AUTO: 0 K/UL (ref 0–0.5)
EOSINOPHIL NFR BLD: 0.2 % (ref 0–8)
ERYTHROCYTE [DISTWIDTH] IN BLOOD BY AUTOMATED COUNT: 12.6 % (ref 11.5–14.5)
HCT VFR BLD AUTO: 33.3 % (ref 37–48.5)
HGB BLD-MCNC: 11.1 G/DL (ref 12–16)
HIV 1+2 AB+HIV1 P24 AG SERPL QL IA: NEGATIVE
IMM GRANULOCYTES # BLD AUTO: 0.05 K/UL (ref 0–0.04)
IMM GRANULOCYTES NFR BLD AUTO: 0.5 % (ref 0–0.5)
LYMPHOCYTES # BLD AUTO: 1.8 K/UL (ref 1–4.8)
LYMPHOCYTES NFR BLD: 19.1 % (ref 18–48)
MCH RBC QN AUTO: 31.8 PG (ref 27–31)
MCHC RBC AUTO-ENTMCNC: 33.3 G/DL (ref 32–36)
MCV RBC AUTO: 95 FL (ref 82–98)
MONOCYTES # BLD AUTO: 0.5 K/UL (ref 0.3–1)
MONOCYTES NFR BLD: 5.4 % (ref 4–15)
NEUTROPHILS # BLD AUTO: 7 K/UL (ref 1.8–7.7)
NEUTROPHILS NFR BLD: 74.6 % (ref 38–73)
NRBC BLD-RTO: 0 /100 WBC
PLATELET # BLD AUTO: 289 K/UL (ref 150–450)
PMV BLD AUTO: 10.5 FL (ref 9.2–12.9)
RBC # BLD AUTO: 3.49 M/UL (ref 4–5.4)
TREPONEMA PALLIDUM IGG+IGM AB [PRESENCE] IN SERUM OR PLASMA BY IMMUNOASSAY: NONREACTIVE
WBC # BLD AUTO: 9.32 K/UL (ref 3.9–12.7)

## 2025-02-10 PROCEDURE — 90471 IMMUNIZATION ADMIN: CPT | Mod: PBBFAC

## 2025-02-10 PROCEDURE — 99999PBSHW PR PBB SHADOW TECHNICAL ONLY FILED TO HB: Mod: PBBFAC,,,

## 2025-02-10 PROCEDURE — 99213 OFFICE O/P EST LOW 20 MIN: CPT | Mod: PBBFAC,TH,25 | Performed by: OBSTETRICS & GYNECOLOGY

## 2025-02-10 PROCEDURE — 86593 SYPHILIS TEST NON-TREP QUANT: CPT | Performed by: OBSTETRICS & GYNECOLOGY

## 2025-02-10 PROCEDURE — 99999 PR PBB SHADOW E&M-EST. PATIENT-LVL III: CPT | Mod: PBBFAC,,, | Performed by: OBSTETRICS & GYNECOLOGY

## 2025-02-10 PROCEDURE — 85025 COMPLETE CBC W/AUTO DIFF WBC: CPT | Performed by: OBSTETRICS & GYNECOLOGY

## 2025-02-10 PROCEDURE — 87389 HIV-1 AG W/HIV-1&-2 AB AG IA: CPT | Performed by: OBSTETRICS & GYNECOLOGY

## 2025-02-10 PROCEDURE — 90715 TDAP VACCINE 7 YRS/> IM: CPT | Mod: PBBFAC

## 2025-02-10 PROCEDURE — 36415 COLL VENOUS BLD VENIPUNCTURE: CPT | Performed by: OBSTETRICS & GYNECOLOGY

## 2025-02-10 RX ADMIN — TETANUS TOXOID, REDUCED DIPHTHERIA TOXOID AND ACELLULAR PERTUSSIS VACCINE, ADSORBED 0.5 ML: 5; 2.5; 8; 8; 2.5 SUSPENSION INTRAMUSCULAR at 11:02

## 2025-02-10 NOTE — PATIENT INSTRUCTIONS
The following signs may be a warning that you may need medical care.  Severe headache not relieved by tylenol.  Blurry vision or seeing spots.  Sudden swelling in your face or hands.  Severe stomach pains or cramps.  Vomiting lasting more than 24 hours.  Fever greater than 100.4 degrees.  Vaginal bleeding that is more than just spotting.  A gush or flow of watery fluid from your vagina.  Decrease or absence of baby's movement (starting at 28 weeks).   (less than 37 weeks) labor: more than 6 contractions in an hour for 2 hours.  Term (greater than 37 weeks) labor: contractions every 5 minutes for 2 hours.    Speak to an L&D nurse on-call: [phone]

## 2025-02-14 ENCOUNTER — PATIENT MESSAGE (OUTPATIENT)
Dept: OTHER | Facility: OTHER | Age: 25
End: 2025-02-14
Payer: MEDICAID

## 2025-02-15 ENCOUNTER — HOSPITAL ENCOUNTER (EMERGENCY)
Facility: OTHER | Age: 25
Discharge: HOME | End: 2025-02-15
Attending: OBSTETRICS & GYNECOLOGY
Payer: MEDICAID

## 2025-02-15 VITALS
DIASTOLIC BLOOD PRESSURE: 77 MMHG | WEIGHT: 164 LBS | OXYGEN SATURATION: 99 % | BODY MASS INDEX: 28 KG/M2 | TEMPERATURE: 98 F | HEART RATE: 71 BPM | RESPIRATION RATE: 16 BRPM | HEIGHT: 64 IN | SYSTOLIC BLOOD PRESSURE: 116 MMHG

## 2025-02-15 DIAGNOSIS — R03.0 ELEVATED BLOOD PRESSURE READING: Primary | ICD-10-CM

## 2025-02-15 DIAGNOSIS — Z3A.30 30 WEEKS GESTATION OF PREGNANCY: ICD-10-CM

## 2025-02-15 LAB
BILIRUBIN, POC UA: NEGATIVE
BLOOD, POC UA: ABNORMAL
CLARITY, UA: CLEAR
COLOR, UA: YELLOW
GLUCOSE, POC UA: NEGATIVE
KETONES, POC UA: ABNORMAL
LEUKOCYTE EST, POC UA: NEGATIVE
NITRITE, POC UA: NEGATIVE
PH UR STRIP: 6 [PH] (ref 5–8)
PROTEIN, POC UA: 30 MG/DL
SPECIFIC GRAVITY, POC UA: >=1.03 (ref 1–1.03)
UROBILINOGEN, POC UA: 0.2 E.U./DL

## 2025-02-15 PROCEDURE — 99284 EMERGENCY DEPT VISIT MOD MDM: CPT

## 2025-02-15 PROCEDURE — 59025 FETAL NON-STRESS TEST: CPT

## 2025-02-16 NOTE — ED PROVIDER NOTES
Encounter Date: 2/15/2025       History     Chief Complaint   Patient presents with    Nausea     C/O Intermittent nausea over the past 12 hours  PMH of hypertension (denies any vision changes)    Headache     Patient is a 24-year-old female with no significant past medical history , 30 weeks 1 day that presents to emergency department with concern for high blood pressure.  Patient states that she had 2 blood pressure readings home that were elevated came for evaluation.    Patient states that her pregnancy has been associated with nausea currently well controlled.  State that she intermittently has exertional shortness of breath however this resolves with rest.    The history is provided by the patient and medical records.     Review of patient's allergies indicates:  No Known Allergies  No past medical history on file.  Past Surgical History:   Procedure Laterality Date    LAPAROSCOPIC SURGICAL REMOVAL OF CYST OF OVARY N/A 2022    Procedure: EXCISION, CYST, OVARY, LAPAROSCOPIC;  Surgeon: Alysia Lazo MD;  Location: [facility];  Service: OB/GYN;  Laterality: N/A;    MOUTH SURGERY       Family History   Problem Relation Name Age of Onset    No Known Problems Paternal Grandmother      No Known Problems Maternal Grandfather      No Known Problems Mother      No Known Problems Sister      Miscarriages / Stillbirths Neg Hx       labor Neg Hx      Stroke Neg Hx      Colon cancer Neg Hx       Social History[1]  Review of Systems   HENT:  Negative for congestion.    Respiratory:  Negative for shortness of breath.    Cardiovascular:  Negative for chest pain.   Gastrointestinal:  Positive for nausea. Negative for abdominal pain and vomiting.   Genitourinary:  Negative for vaginal bleeding and vaginal discharge.       Physical Exam     Initial Vitals [02/15/25 2122]   BP Pulse Resp Temp SpO2   (!) 154/92 85 16 98.2 °F (36.8 °C) 100 %      MAP       --       Temp:  [98.1 °F (36.7 °C)-98.2 °F (36.8 °C)]  No   Pulse:  []   Resp:  [16]   BP: (111-154)/(73-92)   SpO2:  [99 %-100 %]    Physical Exam    Nursing note and vitals reviewed.  Constitutional: She is not diaphoretic. No distress.   Eyes: Conjunctivae are normal.   Cardiovascular:  Normal rate, regular rhythm and normal heart sounds.           Pulmonary/Chest: No respiratory distress. She has no rhonchi. She has no rales.   Abdominal: Abdomen is soft. There is no abdominal tenderness.   Gravid abdomen   Musculoskeletal:         General: Edema (trace) present.     Neurological: She is alert and oriented to person, place, and time. She has normal strength.         ED Course   Fetal non-stress test    Date/Time: 2/15/2025 9:50 PM    Performed by: Luisito Groves DO  Authorized by: Luisito Groves DO    Nonstress Test:     Variability:  6-25 BPM    Decelerations:  None    Accelerations:  15 bpm    Baseline:  150    Contractions:  Not present  Biophysical Profile:     Nonstress Test Interpretation: reactive      Overall Impression:  Reassuring    Labs Reviewed   POCT URINALYSIS W/O SCOPE - Abnormal       Result Value    Spec Grav UA >=1.030 (*)     PH, UA 6.0      Protein, UA 30 (*)     Glucose, UA Negative      Ketones, UA Trace (*)     Bilirubin, UA Negative      Blood, UA Trace-intact (*)     Leukocytes, UA Negative      Nitrite, UA Negative      Urobilinogen, UA 0.2      Color, UA POC Yellow      Clarity, UA, POC Clear            Imaging Results    None          Medications - No data to display  Medical Decision Making  Patient is a 24-year-old female with no significant past medical history , 30 weeks 1 day that presents to emergency department with concern for high blood pressure.    Patient has no other symptom at this time. Screen for 1 hour with q15 minute blood pressure checks. All were within normal limits and no other work up indicated at this time. OB ED return precautions provided and she expressed understanding.               Attending  Attestation:   Physician Attestation Statement for Resident:  As the supervising MD   Physician Attestation Statement: I have personally seen and examined this patient.   I agree with the above history.  -:   As the supervising MD I agree with the above PE.     As the supervising MD I agree with the above treatment, course, plan, and disposition.   -:     See ED course.  Agree with exam.  No signs of severe disease in the [facility].  Agree with discharge to home.  I was personally present during the critical portions of the procedure(s) performed by the resident and was immediately available in the ED to provide services and assistance as needed during the entire procedure.   I have reviewed the following: old records at this facility.                ED Course as of 02/17/25 1207   Sat Feb 15, 2025   2148 BP(!): 154/92 [CD]   2151 BP: 116/74 [CD]   2226 BP: 113/74 [CD]   2226 Patient is a 24 y.o. G1 at 30 1/7 weeks with elevated BP at home.  No Pre-E sxs.  No contractions, SROM or VB.  She reports good FM. 1 elevated BP in ED but no abnormal BP in [facility]. [CD]      ED Course User Index  [CD] Lyubov Haq MD                           Clinical Impression:  Final diagnoses:  [Z3A.30] 30 weeks gestation of pregnancy  [R03.0] Elevated blood pressure reading (Primary)          ED Disposition Condition    Discharge Stable          ED Prescriptions    None       Follow-up Information    None            Luisito Groves DO  Resident  02/15/25 1471         [1]   Social History  Tobacco Use    Smoking status: Never    Smokeless tobacco: Never   Substance Use Topics    Alcohol use: No    Drug use: Yes     Types: Marijuana        Lyubov Haq MD  02/17/25 [id number]

## 2025-02-16 NOTE — DISCHARGE INSTRUCTIONS
Keep previously scheduled Clinic appointment.     Return or call if you have any of the following symptoms: blurred vision, headache, vaginal bleeding, nausea/ vomiting, leaking of fluid, contractions that are 4-5 in one hour (before 36 weeks), decreased fetal movements ( 10 kicks in 2 hours), headache not relieved by Tylenol, or temp of 100.4 and greater.  Begin doing fetal kick counts, (at least 10 movements in 2 hours) starting at 28 weeks gestation. Term Labor: We want your contractions 5 min apart for 2 hours.    Any questions or concerns call Clinic or LD desk for assistance.     For any questions or concerns call [facility] Labor [facility] Delivery [phone].

## 2025-02-24 ENCOUNTER — HOSPITAL ENCOUNTER (EMERGENCY)
Facility: OTHER | Age: 25
Discharge: HOME | End: 2025-02-24
Attending: OBSTETRICS & GYNECOLOGY
Payer: MEDICAID

## 2025-02-24 ENCOUNTER — ROUTINE PRENATAL (OUTPATIENT)
Dept: OBSTETRICS AND GYNECOLOGY | Facility: CLINIC | Age: 25
End: 2025-02-24
Payer: MEDICAID

## 2025-02-24 VITALS
BODY MASS INDEX: 28 KG/M2 | SYSTOLIC BLOOD PRESSURE: 126 MMHG | WEIGHT: 164 LBS | OXYGEN SATURATION: 99 % | HEART RATE: 91 BPM | TEMPERATURE: 98 F | HEIGHT: 64 IN | DIASTOLIC BLOOD PRESSURE: 81 MMHG | RESPIRATION RATE: 16 BRPM

## 2025-02-24 VITALS
WEIGHT: 173.75 LBS | BODY MASS INDEX: 29.82 KG/M2 | SYSTOLIC BLOOD PRESSURE: 126 MMHG | DIASTOLIC BLOOD PRESSURE: 84 MMHG

## 2025-02-24 DIAGNOSIS — Z3A.31 31 WEEKS GESTATION OF PREGNANCY: ICD-10-CM

## 2025-02-24 DIAGNOSIS — K52.9 GASTROENTERITIS: Primary | ICD-10-CM

## 2025-02-24 DIAGNOSIS — E87.6 HYPOKALEMIA: ICD-10-CM

## 2025-02-24 DIAGNOSIS — E83.42 HYPOMAGNESEMIA: ICD-10-CM

## 2025-02-24 DIAGNOSIS — Z34.00 SUPERVISION OF NORMAL FIRST PREGNANCY, ANTEPARTUM: Primary | ICD-10-CM

## 2025-02-24 LAB
ALBUMIN SERPL BCP-MCNC: 2.8 G/DL (ref 3.5–5.2)
ALP SERPL-CCNC: 163 U/L (ref 40–150)
ALT SERPL W/O P-5'-P-CCNC: 17 U/L (ref 10–44)
ANION GAP SERPL CALC-SCNC: 9 MMOL/L (ref 8–16)
AST SERPL-CCNC: 18 U/L (ref 10–40)
BASOPHILS # BLD AUTO: 0.01 K/UL (ref 0–0.2)
BASOPHILS NFR BLD: 0.1 % (ref 0–1.9)
BILIRUB SERPL-MCNC: 0.5 MG/DL (ref 0.1–1)
BILIRUBIN, POC UA: ABNORMAL
BILIRUBIN, POC UA: NEGATIVE
BLOOD, POC UA: ABNORMAL
BLOOD, POC UA: NEGATIVE
BUN SERPL-MCNC: 5 MG/DL (ref 6–20)
CALCIUM SERPL-MCNC: 9 MG/DL (ref 8.7–10.5)
CHLORIDE SERPL-SCNC: 109 MMOL/L (ref 95–110)
CLARITY, UA: ABNORMAL
CLARITY, UA: CLEAR
CO2 SERPL-SCNC: 18 MMOL/L (ref 23–29)
COLOR, UA: ABNORMAL
COLOR, UA: YELLOW
CREAT SERPL-MCNC: 0.6 MG/DL (ref 0.5–1.4)
CREAT UR-MCNC: 101 MG/DL (ref 15–325)
CREAT UR-MCNC: 232.9 MG/DL (ref 15–325)
DIFFERENTIAL METHOD BLD: ABNORMAL
EOSINOPHIL # BLD AUTO: 0 K/UL (ref 0–0.5)
EOSINOPHIL NFR BLD: 0 % (ref 0–8)
ERYTHROCYTE [DISTWIDTH] IN BLOOD BY AUTOMATED COUNT: 12.7 % (ref 11.5–14.5)
EST. GFR  (NO RACE VARIABLE): >60 ML/MIN/1.73 M^2
GLUCOSE SERPL-MCNC: 93 MG/DL (ref 70–110)
GLUCOSE, POC UA: 500 MG/DL
GLUCOSE, POC UA: NEGATIVE
HCT VFR BLD AUTO: 32.1 % (ref 37–48.5)
HGB BLD-MCNC: 10.8 G/DL (ref 12–16)
IMM GRANULOCYTES # BLD AUTO: 0.04 K/UL (ref 0–0.04)
IMM GRANULOCYTES NFR BLD AUTO: 0.5 % (ref 0–0.5)
KETONES, POC UA: >=160 MG/DL
KETONES, POC UA: NEGATIVE
LEUKOCYTE EST, POC UA: NEGATIVE
LEUKOCYTE EST, POC UA: NEGATIVE
LYMPHOCYTES # BLD AUTO: 0.8 K/UL (ref 1–4.8)
LYMPHOCYTES NFR BLD: 8.9 % (ref 18–48)
MAGNESIUM SERPL-MCNC: 1.5 MG/DL (ref 1.6–2.6)
MCH RBC QN AUTO: 31.2 PG (ref 27–31)
MCHC RBC AUTO-ENTMCNC: 33.6 G/DL (ref 32–36)
MCV RBC AUTO: 93 FL (ref 82–98)
MONOCYTES # BLD AUTO: 0.3 K/UL (ref 0.3–1)
MONOCYTES NFR BLD: 3.7 % (ref 4–15)
NEUTROPHILS # BLD AUTO: 7.4 K/UL (ref 1.8–7.7)
NEUTROPHILS NFR BLD: 86.8 % (ref 38–73)
NITRITE, POC UA: NEGATIVE
NITRITE, POC UA: NEGATIVE
NRBC BLD-RTO: 0 /100 WBC
PH UR STRIP: 6 [PH] (ref 5–8)
PH UR STRIP: 6 [PH] (ref 5–8)
PHOSPHATE SERPL-MCNC: 3.6 MG/DL (ref 2.7–4.5)
PLATELET # BLD AUTO: 237 K/UL (ref 150–450)
PMV BLD AUTO: 10.4 FL (ref 9.2–12.9)
POTASSIUM SERPL-SCNC: 3.4 MMOL/L (ref 3.5–5.1)
PROT SERPL-MCNC: 6.7 G/DL (ref 6–8.4)
PROT UR-MCNC: 37 MG/DL (ref 0–15)
PROT UR-MCNC: 96 MG/DL (ref 0–15)
PROT/CREAT UR: 0.37 MG/G{CREAT} (ref 0–0.2)
PROT/CREAT UR: 0.41 MG/G{CREAT} (ref 0–0.2)
PROTEIN, POC UA: 100 MG/DL
PROTEIN, POC UA: >=300 MG/DL
RBC # BLD AUTO: 3.46 M/UL (ref 4–5.4)
SODIUM SERPL-SCNC: 136 MMOL/L (ref 136–145)
SPECIFIC GRAVITY, POC UA: >=1.03 (ref 1–1.03)
SPECIFIC GRAVITY, POC UA: >=1.03 (ref 1–1.03)
UROBILINOGEN, POC UA: 1 E.U./DL
UROBILINOGEN, POC UA: 1 E.U./DL
WBC # BLD AUTO: 8.54 K/UL (ref 3.9–12.7)

## 2025-02-24 PROCEDURE — 96365 THER/PROPH/DIAG IV INF INIT: CPT

## 2025-02-24 PROCEDURE — 25000003 PHARM REV CODE 250

## 2025-02-24 PROCEDURE — 96375 TX/PRO/DX INJ NEW DRUG ADDON: CPT

## 2025-02-24 PROCEDURE — 85025 COMPLETE CBC W/AUTO DIFF WBC: CPT

## 2025-02-24 PROCEDURE — 96361 HYDRATE IV INFUSION ADD-ON: CPT

## 2025-02-24 PROCEDURE — 99213 OFFICE O/P EST LOW 20 MIN: CPT | Mod: PBBFAC,TH,25 | Performed by: ADVANCED PRACTICE MIDWIFE

## 2025-02-24 PROCEDURE — 99213 OFFICE O/P EST LOW 20 MIN: CPT | Mod: TH,S$PBB,UC, | Performed by: ADVANCED PRACTICE MIDWIFE

## 2025-02-24 PROCEDURE — 83735 ASSAY OF MAGNESIUM: CPT

## 2025-02-24 PROCEDURE — 84100 ASSAY OF PHOSPHORUS: CPT

## 2025-02-24 PROCEDURE — 80053 COMPREHEN METABOLIC PANEL: CPT

## 2025-02-24 PROCEDURE — 99999 PR PBB SHADOW E&M-EST. PATIENT-LVL III: CPT | Mod: PBBFAC,,, | Performed by: ADVANCED PRACTICE MIDWIFE

## 2025-02-24 PROCEDURE — 59025 FETAL NON-STRESS TEST: CPT

## 2025-02-24 PROCEDURE — 99284 EMERGENCY DEPT VISIT MOD MDM: CPT | Mod: 25,27

## 2025-02-24 PROCEDURE — 63600175 PHARM REV CODE 636 W HCPCS

## 2025-02-24 PROCEDURE — 84156 ASSAY OF PROTEIN URINE: CPT | Mod: 91

## 2025-02-24 RX ORDER — POTASSIUM CHLORIDE 20 MEQ/1
40 TABLET, EXTENDED RELEASE ORAL ONCE
Status: COMPLETED | OUTPATIENT
Start: 2025-02-24 | End: 2025-02-24

## 2025-02-24 RX ORDER — ONDANSETRON HYDROCHLORIDE 2 MG/ML
8 INJECTION, SOLUTION INTRAVENOUS ONCE
Status: COMPLETED | OUTPATIENT
Start: 2025-02-24 | End: 2025-02-24

## 2025-02-24 RX ORDER — POTASSIUM CHLORIDE 20 MEQ/1
40 TABLET, EXTENDED RELEASE ORAL ONCE
Qty: 2 TABLET | Refills: 0 | Status: SHIPPED | OUTPATIENT
Start: 2025-02-24 | End: 2025-02-24

## 2025-02-24 RX ORDER — MAGNESIUM SULFATE HEPTAHYDRATE 40 MG/ML
2 INJECTION, SOLUTION INTRAVENOUS ONCE
Status: COMPLETED | OUTPATIENT
Start: 2025-02-24 | End: 2025-02-24

## 2025-02-24 RX ORDER — ACETAMINOPHEN 500 MG
1000 TABLET ORAL ONCE
Status: COMPLETED | OUTPATIENT
Start: 2025-02-24 | End: 2025-02-24

## 2025-02-24 RX ADMIN — ACETAMINOPHEN 1000 MG: 500 TABLET, FILM COATED ORAL at 02:02

## 2025-02-24 RX ADMIN — ONDANSETRON 8 MG: 2 INJECTION INTRAMUSCULAR; INTRAVENOUS at 02:02

## 2025-02-24 RX ADMIN — POTASSIUM CHLORIDE 40 MEQ: 1500 TABLET, EXTENDED RELEASE ORAL at 03:02

## 2025-02-24 RX ADMIN — SODIUM CHLORIDE, SODIUM LACTATE, POTASSIUM CHLORIDE, CALCIUM CHLORIDE AND DEXTROSE MONOHYDRATE 1000 ML: 5; 600; 310; 30; 20 INJECTION, SOLUTION INTRAVENOUS at 02:02

## 2025-02-24 RX ADMIN — MAGNESIUM SULFATE HEPTAHYDRATE 2 G: 40 INJECTION, SOLUTION INTRAVENOUS at 03:02

## 2025-02-24 NOTE — PROGRESS NOTES
Reason for visit: No chief complaint on file.      HPI:   24 y.o., at 31w3d by Estimated Date of Delivery: 25    Pt in wioth c/o vomiting/ diarrhea over last 12 hrs. Pt was seen in the [facility]- see note.     - Contractions: denies  - Bleeding: denies  - Loss of fluid: denies  - Fetal movement: reports good FM, reinforced BId  - Nausea: no  - Vomiting: no  - Headache: no      Reviewed:    Past medical, surgical, social, family, and obstetric history: Reviewed and updated in EMR.  Medications: Reviewed and updated in EMR.  Allergies: Patient has no known allergies.    Pregnancy dating, labs, ultrasound reports, prenatal testing, and problem list: Reviewed and updated in EMR.  Outside records: na  Independent interpretation of tests: na  Discussion with another healthcare professional: na      Vitals: /84   Wt 78.8 kg (173 lb 11.6 oz)   BMI 29.82 kg/m²     Physical exam:  GENERAL: No acute distress  ABD: Gravid      Assessment and Plan:    Supervision of normal first pregnancy, antepartum        BP stable at office visit  Pt reports feeling a little better, tolerating po fluids- discussed probable GI virus- importance of staying hydrated.      labor precautions given  Follow-up: 2 weeks      I spent a total of 20 minutes on the day of the visit. This includes face to face time and non-face to face time preparing to see the patient (eg, review of tests), Obtaining and/or reviewing separately obtained history, Documenting clinical information in the electronic or other health record, Independently interpreting results and communicating results to the patient/family/caregiver, or Care coordination.

## 2025-02-24 NOTE — ED PROVIDER NOTES
Encounter Date: 2025     History     Chief Complaint   Patient presents with    Abdominal Pain     Generalized abd pain w/ NVD over the past 10 hours    Currently 31 weeks pregnant   Denies an discharge nor hemorrhaging of any kind     31 Weeks Pregnant (DD 2025)     Julius Peguero is a 24 y.o. [id number] at 31w3d who presents complaining of nausea, vomiting, diarrhea x10 hours associated with generalized abdominal pain. Patient reports onset of N/V/D at 4 pm with inability to tolerate PO since. Her last meal was a [employer] cheeseburger with fries around 3 pm yesterday. She has attempted to drink water since but begins gagging and is unable to keep fluids down. She attempted to treat at home with Zofran and Tylenol but vomited the pills up shortly after intake. Patient reports feelings of general malaise but denies fevers. She additionally reports generalized abdominal pain associated with her symptoms, denies VB, LOF. Reports normal FM. Denies blood in the vomitus or stool, dysuria, hematuria. Reports +sick contacts, niece and little brother with recent stomach bug. Patient additionally reports a headache but denies vision changes/scotomas, CP, SOB, RUQ pain.    Of note, patient with mild range BP on arrival. Prior mild range BP noted on 2/15/2025 therefore meeting criteria for dx of gHTN.     This IUP is complicated by newly diagnosed gHTN.      Review of patient's allergies indicates:  No Known Allergies  No past medical history on file.  Past Surgical History:   Procedure Laterality Date    LAPAROSCOPIC SURGICAL REMOVAL OF CYST OF OVARY N/A 2022    Procedure: EXCISION, CYST, OVARY, LAPAROSCOPIC;  Surgeon: Alyisa Lazo MD;  Location: [facility];  Service: OB/GYN;  Laterality: N/A;    MOUTH SURGERY       Family History   Problem Relation Name Age of Onset    No Known Problems Paternal Grandmother      No Known Problems Maternal Grandfather      No Known Problems Mother      No Known Problems  Sister      Miscarriages / Stillbirths Neg Hx       labor Neg Hx      Stroke Neg Hx      Colon cancer Neg Hx       Social History[1]  Review of Systems   Constitutional:  Positive for fatigue (malaise). Negative for chills and fever.   HENT:  Negative for congestion and sore throat.    Eyes:  Negative for visual disturbance.   Respiratory:  Negative for cough and shortness of breath.    Cardiovascular:  Negative for chest pain and palpitations.   Gastrointestinal:  Positive for abdominal pain (generalized, mild), diarrhea, nausea and vomiting. Negative for blood in stool.   Genitourinary:  Negative for dysuria and vaginal bleeding.   Neurological:  Negative for seizures and syncope.       Physical Exam     Initial Vitals [25 0141]   BP Pulse Resp Temp SpO2   (!) 141/85 (!) 112 17 99.5 °F (37.5 °C) 98 %      MAP       --         Physical Exam    Nursing note and vitals reviewed.  Constitutional: She is not diaphoretic. No distress.   ill/fatigued appearing   HENT:   Head: Normocephalic and atraumatic.   Eyes: EOM are normal.   Neck:   Normal range of motion.  Cardiovascular:            Low grade tachycardia   Pulmonary/Chest: No respiratory distress.   Abdominal: Abdomen is soft. There is no abdominal tenderness.   Gravid but soft/NT There is no rebound and no guarding.   Musculoskeletal:         General: No tenderness or edema. Normal range of motion.      Cervical back: Normal range of motion.     Neurological: She is alert and oriented to person, place, and time.   Skin: Skin is warm and dry.   Psychiatric: She has a normal mood and affect. Her behavior is normal.         ED Course   Obtain Fetal nonstress test (NST)    Date/Time: 2025 2:15 AM    Performed by: Tess Azul MD  Authorized by: Tess Azul MD    Nonstress Test:     Variability:  6-25 BPM    Decelerations:  None    Accelerations:  10 bpm    Baseline:  150    Uterine Irritability: No      Contractions:  Not  present  Biophysical Profile:     Nonstress Test Interpretation: reactive      Overall Impression:  Reassuring  Post-procedure:     Patient tolerance:  Patient tolerated the procedure well with no immediate complications    Labs Reviewed   CBC W/ AUTO DIFFERENTIAL - Abnormal       Result Value    WBC 8.54      RBC 3.46 (*)     Hemoglobin 10.8 (*)     Hematocrit 32.1 (*)     MCV 93      MCH 31.2 (*)     MCHC 33.6      RDW 12.7      Platelets 237      MPV 10.4      Immature Granulocytes 0.5      Gran # (ANC) 7.4      Immature Grans (Abs) 0.04      Lymph # 0.8 (*)     Mono # 0.3      Eos # 0.0      Baso # 0.01      nRBC 0      Gran % 86.8 (*)     Lymph % 8.9 (*)     Mono % 3.7 (*)     Eosinophil % 0.0      Basophil % 0.1      Differential Method Automated     COMPREHENSIVE METABOLIC PANEL - Abnormal    Sodium 136      Potassium 3.4 (*)     Chloride 109      CO2 18 (*)     Glucose 93      BUN 5 (*)     Creatinine 0.6      Calcium 9.0      Total Protein 6.7      Albumin 2.8 (*)     Total Bilirubin 0.5      Alkaline Phosphatase 163 (*)     AST 18      ALT 17      eGFR >60      Anion Gap 9     MAGNESIUM - Abnormal    Magnesium 1.5 (*)    PROTEIN / CREATININE RATIO, URINE - Abnormal    Protein, Urine Random 96 (*)     Creatinine, Urine 232.9      Prot/Creat Ratio, Urine 0.41 (*)     Narrative:     Specimen Source->Urine   PROTEIN / CREATININE RATIO, URINE - Abnormal    Protein, Urine Random 37 (*)     Creatinine, Urine 101.0      Prot/Creat Ratio, Urine 0.37 (*)     Narrative:     Specimen Source->Urine   POCT URINALYSIS W/O SCOPE - Abnormal    Spec Grav UA >=1.030 (*)     PH, UA 6.0      Protein, UA >=300 (*)     Glucose, UA Negative      Ketones, UA >=160 (*)     Bilirubin, UA Small (*)     Blood, UA Negative      Leukocytes, UA Negative      Nitrite, UA Negative      Urobilinogen, UA 1.0      Color, UA POC Dark yellow (*)     Clarity, UA, POC Cloudy (*)    POCT URINALYSIS W/O SCOPE - Abnormal    Spec Grav UA >=1.030 (*)      PH, UA 6.0      Protein,  (*)     Glucose,  (*)     Ketones, UA Negative      Bilirubin, UA Negative      Blood, UA Trace-intact (*)     Leukocytes, UA Negative      Nitrite, UA Negative      Urobilinogen, UA 1.0      Color, UA POC Yellow      Clarity, UA, POC Clear     PHOSPHORUS    Phosphorus 3.6            Imaging Results    None          Medications   ondansetron injection 8 mg (8 mg Intravenous Given 25 0231)   acetaminophen tablet 1,000 mg (1,000 mg Oral Given 25 0231)   dextrose 5% lactated ringers bolus 1,000 mL (0 mLs Intravenous Stopped 25 0424)   magnesium sulfate 2g in water 50mL IVPB (premix) (0 g Intravenous Stopped 25 0344)   potassium chloride SA CR tablet 40 mEq (40 mEq Oral Given 25 0320)     Medical Decision Making  Julius Peguero is a 24 y.o. [id number] at 31w3d who presents complaining of nausea, vomiting, diarrhea x10 hours associated with generalized abdominal pain.    Temp:  [98 °F (36.7 °C)-99.5 °F (37.5 °C)] 98 °F (36.7 °C)  Pulse:  [101-112] 101  Resp:  [16-17] 16  SpO2:  [98 %-100 %] 100 %  BP: (135-141)/(85-93) 135/93    NST: reactive and reassuring  [place]: quiet    Nausea/Vomiting/Diarrhea/Headache  - VS: afebrile, tachycardic, mild range BP  - Labs: CBC, CMP, mag, phos        - K 3.4 > replaced with 40 mEq PO x1 in OB ED, additional 40 mEq PO sent to pharmacy        - Mg 1.5 > replaced with 2g IV mag bolus  - Urine dip concentrated, +++ketones  - Tx: IV Zofran 8 mg, Tylenol 1g, 1L D5 LR  - Tolerated PO challenge s/p treatment, patient reports improvement in symptoms    Elevated BP -- PreE w/o SF  - CBC, CMP wnl as above  - P:C ratio elevated at 0.41 however likely concentrated due to appearance of initial urine sample. Additional urine sample collected s/p fluid resuscitation improved with less protein, sent for repeat P:C ratio to ensure elevation is significant enough for diagnosis >> repeat remains elevated at 0.37. In combination with elevated  blood pressures, now meets criteria for PreE w/o SF  - Headache improved with Tylenol  - Counseled on BP checks at home  - Follow up as scheduled for routine OB  10:15 am    - Patient stable for discharge at this time  - ED return precautions given  - She voiced understanding and is in agreement with the plan    Tess Azul MD  OB/GYN PGY-1    Amount and/or Complexity of Data Reviewed  Labs: ordered.    Risk  OTC drugs.  Prescription drug management.              Attending Attestation:   Physician Attestation Statement for Resident:  As the supervising MD   Physician Attestation Statement: I have personally seen and examined this patient.   I agree with the above history.  -:   As the supervising MD I agree with the above PE.     As the supervising MD I agree with the above treatment, course, plan, and disposition.   -:     NST reactive and reassuring, toco with no contractions.  Mild range BP with GHTN.  UA with large ketones cleared with D5LR bolus.  Magnesium and potassium replaced.  Nausea improved with zofran.  Passed PO challenge.  Agree with discharge to home.  I was personally present during the critical portions of the procedure(s) performed by the resident and was immediately available in the ED to provide services and assistance as needed during the entire procedure.   I have reviewed the following: old records at this facility.                                       Clinical Impression:  Final diagnoses:  [K52.9] Gastroenteritis (Primary)  [Z3A.31] 31 weeks gestation of pregnancy  [E87.6] Hypokalemia  [E83.42] Hypomagnesemia          ED Disposition Condition    Discharge Stable          ED Prescriptions       Medication Sig Dispense Start Date End Date Auth. Provider    potassium chloride SA (K-DUR,KLOR-CON) 20 MEQ tablet () Take 2 tablets (40 mEq total) by mouth once. for 1 dose 2 tablet 2025 Tess Azul MD          Follow-up Information    None            Jorge Alberto  MD Tess  Resident  02/24/25 0715         [1]   Social History  Tobacco Use    Smoking status: Never    Smokeless tobacco: Never   Substance Use Topics    Alcohol use: No    Drug use: Yes     Types: Marijuana        Lyubov Hqa MD  02/27/25 1008

## 2025-02-24 NOTE — DISCHARGE INSTRUCTIONS
Keep previously scheduled clinic appointment  Return to L&D if you experience contractions every 2-5 minutes for two consecutive hours  Vaginal Bleeding  Decreased fetal movement  Leaking of fluid  Headache, dizziness or blurred vision  Temperature 100.4 or greater  Call the clinic ([phone]) during the day time or L&D [phone]) after hours for any questions/concerns.  Drink 8-10 glasses of water a day

## 2025-02-28 ENCOUNTER — PATIENT MESSAGE (OUTPATIENT)
Dept: OTHER | Facility: OTHER | Age: 25
End: 2025-02-28
Payer: MEDICAID

## 2025-03-09 PROBLEM — O13.3 GESTATIONAL HYPERTENSION, THIRD TRIMESTER: Status: ACTIVE | Noted: 2025-03-09

## 2025-03-09 PROBLEM — O14.93 PREECLAMPSIA, THIRD TRIMESTER: Status: ACTIVE | Noted: 2025-03-09

## 2025-03-10 ENCOUNTER — HOSPITAL ENCOUNTER (OUTPATIENT)
Dept: PERINATAL CARE | Facility: OTHER | Age: 25
Discharge: HOME | End: 2025-03-10
Attending: OBSTETRICS & GYNECOLOGY
Payer: MEDICAID

## 2025-03-10 ENCOUNTER — ROUTINE PRENATAL (OUTPATIENT)
Dept: OBSTETRICS AND GYNECOLOGY | Facility: CLINIC | Age: 25
End: 2025-03-10
Payer: MEDICAID

## 2025-03-10 VITALS
BODY MASS INDEX: 30.54 KG/M2 | WEIGHT: 177.94 LBS | SYSTOLIC BLOOD PRESSURE: 132 MMHG | DIASTOLIC BLOOD PRESSURE: 90 MMHG

## 2025-03-10 DIAGNOSIS — O14.93 PREECLAMPSIA, THIRD TRIMESTER: ICD-10-CM

## 2025-03-10 DIAGNOSIS — O26.849 UTERINE SIZE-DATE DISCREPANCY, ANTEPARTUM: ICD-10-CM

## 2025-03-10 DIAGNOSIS — O09.90 SUPERVISION OF HIGH RISK PREGNANCY, ANTEPARTUM: Primary | ICD-10-CM

## 2025-03-10 PROCEDURE — 59025 FETAL NON-STRESS TEST: CPT | Mod: 26,,, | Performed by: STUDENT IN AN ORGANIZED HEALTH CARE EDUCATION/TRAINING PROGRAM

## 2025-03-10 PROCEDURE — 99214 OFFICE O/P EST MOD 30 MIN: CPT | Mod: TH,S$PBB,, | Performed by: OBSTETRICS & GYNECOLOGY

## 2025-03-10 PROCEDURE — 99212 OFFICE O/P EST SF 10 MIN: CPT | Mod: PBBFAC,25,TH | Performed by: OBSTETRICS & GYNECOLOGY

## 2025-03-10 PROCEDURE — 99999 PR PBB SHADOW E&M-EST. PATIENT-LVL II: CPT | Mod: PBBFAC,,, | Performed by: OBSTETRICS & GYNECOLOGY

## 2025-03-10 PROCEDURE — 59025 FETAL NON-STRESS TEST: CPT

## 2025-03-10 NOTE — PROGRESS NOTES
Pregnancy dating, labs, ultrasound reports, prenatal testing, and problem list; prior records and results; and available outside records were reviewed and updated in EMR.  Pertinent findings were noted below.    Reason for Visit   Routine Prenatal Visit    HPI   24 y.o., at 33w3d by Estimated Date of Delivery: 25    Recently diagnosed with PreE in [facility]  Bps have been elevated on conn mom    Contractions: No   Bleeding: No   Loss of fluid: No   Fetal movement: Yes   Nausea: Rare   Vomiting: No   Headache: Yes, currently 5 out of 10, did not take anything, denies RUQ pain, denies visual changes     Exam   BP (!) 132/90   Wt 80.7 kg (177 lb 14.6 oz)   BMI 30.54 kg/m²     TW#  GENERAL: No acute distress  ABD: Gravid  S<<D    Assessment and Plan   Supervision of high risk pregnancy, antepartum  -     US MFM Procedure ([facility]); Future; Expected date: 2025    Preeclampsia, third trimester  -     Prenatal Testing- [facility]; Standing  -     IP OB Labor Induction; Future; Expected date: 2025  -     US MFM Procedure ([facility]); Future; Expected date: 2025  -     Comprehensive Metabolic Panel; Future; Expected date: 03/10/2025  -     CBC Auto Differential; Future; Expected date: 03/10/2025    Uterine size-date discrepancy, antepartum  -     US MFM Procedure ([facility]); Future; Expected date: 2025      Diagnosed with PreE based on 2 mild range Bps on 2 separate occasions and p/c ratio 0.37  IOL at 37wk d/w patient - ordered  PreE/[facility] precautions given  Needs weekly appts/labs, start 2x/wk PNT ASAP  Growth US ordered for S<D and new dx PreE  Patient instructed to take tylenol for HA and if doesn't improve to go to [facility] without fail. Patient expressed understanding  GBS/consents next visit     labor, Pain and bleeding, and fetal movement count precautions given  Follow-up: 2 weeks    Total time of 35 minutes, including face-to-face time and non-face-to-face time preparing to see  the patient (eg, review of tests), obtaining and/or reviewing separately obtained history, documenting clinical information in the electronic or other health record, independently interpreting results, communicating results to the patient/family/caregiver, or care coordination

## 2025-03-10 NOTE — LETTER
March 10, 2025      [facility]  [address]  [facility] [address]  [facility] [address]  Phone: [phone]  Fax: [phone]       Patient: Julius Peguero   YOB: 2000  Date of Visit: 03/10/2025    To Whom It May Concern:    Dewey Peguero receives her prenatal care at [facility] on 03/10/2025. She was recently diagnosed with a high risk pregnancy. She needs to come into the office twice a week for monitoring. She will be delivered April 4th at the latest. If you have any questions or concerns, or if I can be of further assistance, please do not hesitate to contact me.    Sincerely,      Alysia Lazo MD

## 2025-03-11 ENCOUNTER — OFFICE VISIT (OUTPATIENT)
Dept: MATERNAL FETAL MEDICINE | Facility: CLINIC | Age: 25
End: 2025-03-11
Payer: MEDICAID

## 2025-03-11 ENCOUNTER — PROCEDURE VISIT (OUTPATIENT)
Dept: MATERNAL FETAL MEDICINE | Facility: CLINIC | Age: 25
End: 2025-03-11
Payer: MEDICAID

## 2025-03-11 VITALS — DIASTOLIC BLOOD PRESSURE: 97 MMHG | SYSTOLIC BLOOD PRESSURE: 139 MMHG

## 2025-03-11 DIAGNOSIS — O26.849 UTERINE SIZE-DATE DISCREPANCY, ANTEPARTUM: ICD-10-CM

## 2025-03-11 DIAGNOSIS — O36.5990 PREGNANCY AFFECTED BY FETAL GROWTH RESTRICTION: ICD-10-CM

## 2025-03-11 DIAGNOSIS — O14.93 PREECLAMPSIA, THIRD TRIMESTER: ICD-10-CM

## 2025-03-11 DIAGNOSIS — O09.90 SUPERVISION OF HIGH RISK PREGNANCY, ANTEPARTUM: ICD-10-CM

## 2025-03-11 DIAGNOSIS — O36.5990 FETAL GROWTH RESTRICTION ANTEPARTUM: Primary | ICD-10-CM

## 2025-03-11 DIAGNOSIS — Z36.89 ENCOUNTER FOR ULTRASOUND TO ASSESS FETAL GROWTH: Primary | ICD-10-CM

## 2025-03-11 PROCEDURE — 76819 FETAL BIOPHYS PROFIL W/O NST: CPT | Mod: 26,S$PBB,, | Performed by: OBSTETRICS & GYNECOLOGY

## 2025-03-11 PROCEDURE — 3075F SYST BP GE 130 - 139MM HG: CPT | Mod: CPTII,,, | Performed by: OBSTETRICS & GYNECOLOGY

## 2025-03-11 PROCEDURE — 3080F DIAST BP >= 90 MM HG: CPT | Mod: CPTII,,, | Performed by: OBSTETRICS & GYNECOLOGY

## 2025-03-11 PROCEDURE — 99999 PR PBB SHADOW E&M-EST. PATIENT-LVL I: CPT | Mod: PBBFAC,,, | Performed by: OBSTETRICS & GYNECOLOGY

## 2025-03-11 PROCEDURE — 1159F MED LIST DOCD IN RCRD: CPT | Mod: CPTII,,, | Performed by: OBSTETRICS & GYNECOLOGY

## 2025-03-11 PROCEDURE — 1160F RVW MEDS BY RX/DR IN RCRD: CPT | Mod: CPTII,,, | Performed by: OBSTETRICS & GYNECOLOGY

## 2025-03-11 PROCEDURE — 76816 OB US FOLLOW-UP PER FETUS: CPT | Mod: 26,S$PBB,, | Performed by: OBSTETRICS & GYNECOLOGY

## 2025-03-11 PROCEDURE — 99211 OFF/OP EST MAY X REQ PHY/QHP: CPT | Mod: PBBFAC,TH | Performed by: OBSTETRICS & GYNECOLOGY

## 2025-03-11 PROCEDURE — 76820 UMBILICAL ARTERY ECHO: CPT | Mod: PBBFAC | Performed by: OBSTETRICS & GYNECOLOGY

## 2025-03-11 PROCEDURE — 99214 OFFICE O/P EST MOD 30 MIN: CPT | Mod: S$PBB,TH,, | Performed by: OBSTETRICS & GYNECOLOGY

## 2025-03-11 NOTE — ASSESSMENT & PLAN NOTE
Today fetal growth restriction was diagnosed on growth ultrasound, with EFW 5% and AC 2%. No fetal structural abnormalities are identified, and the amniotic fluid volume is normal. A biophysical profile was performed, and the score was reassuring at 8/8. Umbilical artery Doppler studies were also performed, and the S/D ratio is normal.   Defined as either EFW or AC <10%, fetal growth restriction is associated with increased  morbidity, including  ICU admission and stillbirth. A variety of etiologies can contribute to suboptimal fetal growth in the third trimester. The majority of growth restriction is attributable to constitutional make-up. However, chromosomal and genetic anomalies, infectious causes and uteroplacental dysfunction can also cause lagging fetal growth. Underlying maternal comorbidities, such as hypertensive disease or autoimmune disorders, increase the risk of restricted fetal growth. Unfortunately, our ability to discern constitutional from pathological is limited, meaning we cast a wide net to hopefully identify all at-risk pregnancies.   Pregnancies diagnosed with fetal growth restriction stratify into an intensive antepartum fetal surveillance that includes twice weekly antepartum fetal surveillance (typically BPP once weekly and NST on another day) and weekly umbilical artery doppler interrogation (typically paired with the BPP). Based on those assessments and degree of growth restriction, delivery is recommended:  - 38w0d - 39w0d: AC <9% or EFW 3-9% with normal UA Doppler S/D ratio, no co-morbidities,  surveillance is reassuring   - 37w0d - 37w6d: EFW <3% -or- EFW 3-9% with UA Doppler S/D > 95%  - 33w0d - 34w0d: UA Dopplers with Absent End Diastolic Flow  - 30w0d - 32w0d: UA Dopplers with Reversed End Diastolic Flow      Recommendations:  1. Twice weekly antepartum fetal surveillance (typically BPP+UA Dopplers alternating with NST)  2. Weekly umbilical artery doppler  interrogation  3. Follow up fetal growth assessment in 3 weeks  4. Delivery timing based on current findings would be tentatively for 37 weeks in the context of preeclampsia without severe features, though the battery of testing may speak otherwise

## 2025-03-11 NOTE — ASSESSMENT & PLAN NOTE
Without severe features at this junction  BP non-severe today, pt remains without symptoms     As pregnancy associated hypertension can evolve in severity, she remains at risk to develop severe features in the coming weeks. So long as BPs remain < 160/< 110 and lab/exam/symptoms remain stable, GHTN & preeclampsia without severe features can be managed expectantly with weekly prenatal visits and twice weekly prenatal testing (BPPs and/or NSTs with weekly fluid checks). We recommend against initiation of antihypertensives, particularly in the outpatient setting; if antihypertensives are needed to maintain BP <160/< 110, she meets criteria for severe disease and should be managed inpatient through delivery. For non-severe pregnancy associated hypertension, delivery timing is 37 weeks. Severe features warrant inpatient management, with planned delivery at 34 weeks or at time of diagnosis if later. Hypertensive disorders of pregnancy increase risks for poor outcomes, in current/future pregnancies, as well as for long term cardiac related mortality. Recurrence can be prevented in future pregnancy with initiation of ASA 81 mg daily at 12 weeks gestation. Prevention of long term morbidities, including chronic hypertension and coronary artery disease, will depend on establishing regular care with a primary care provider.    Recommendations:  1. Can continue expectant outpatient management through 37w unless patient develops:  - Severe HA non-responsive to acetaminophen, visual disturbances, acute onset shortness of breath or RUQ pain  - Persistent SBP > 160 or DBP > 110  - Laboratory evidence of end organ failure, including thrombocytopenia < 100K, elevated Cr (2x baseline levels or > 1.1), elevated transaminases (2x upper limit of normal)  - Other findings suggestive of severe disease such as elevated lactate dehydrogenase or evidence of pulmonary edema  - In such scenarios the target delivery date is 34w  2. Recommend  against the outpatient use of antihypertensives for pregnancy associated hypertension  - If inpatient medications are required, we generally recommend titration of blood pressure medications after 32w once patient is through the steroid window  3. While expert recommendations call for weekly serum assessment, our system data have demonstrated no benefit to serial lab assessment in the absence of new onset signs/symptoms of escalating blood pressures for patients with PIH managed outpatient  4. For future pregnancies, recommend initiation of ASA 81 mg daily at 12 weeks  5. Recommend patient establish primary care provider if she has not already done so

## 2025-03-11 NOTE — PROGRESS NOTES
MATERNAL-FETAL MEDICINE   CONSULT NOTE    Provider requesting consultation: Dr. Lazo    SUBJECTIVE:     Ms. Julius Peguero is a 24 y.o.  female with IUP at 33w4d who is seen in consultation by MFM for evaluation and management of:  Problem   Fetal Growth Restriction Antepartum   Preeclampsia, Third Trimester            Medication List with Changes/Refills   Current Medications    PRENATAL VIT-IRON FUM-FOLIC AC 28 MG IRON- 800 MCG TAB    Take 1 tablet by mouth once daily.   Discontinued Medications    ACETAMINOPHEN (TYLENOL) 500 MG TABLET    Take 2 tablets (1,000 mg total) by mouth every 6 (six) hours as needed.    ONDANSETRON (ZOFRAN-ODT) 4 MG TBDL    Take 1 tablet (4 mg total) by mouth every 8 (eight) hours as needed (nausea).    ONDANSETRON (ZOFRAN-ODT) 8 MG TBDL    Take 1 tablet (8 mg total) by mouth every 6 (six) hours as needed (nausea).    TERCONAZOLE (TERAZOL 7) 0.4 % CREA    Place 1 applicator vaginally every evening.       Review of patient's allergies indicates:  No Known Allergies    PMH:History reviewed. No pertinent past medical history.    PObHx:  OB History    Para Term  AB Living   1 0 0 0 0 0   SAB IAB Ectopic Multiple Live Births   0 0 0 0 0      # Outcome Date GA Lbr Mark/2nd Weight Sex Type Anes PTL Lv   1 Current                PSH:  Past Surgical History:   Procedure Laterality Date    LAPAROSCOPIC SURGICAL REMOVAL OF CYST OF OVARY N/A 2022    Procedure: EXCISION, CYST, OVARY, LAPAROSCOPIC;  Surgeon: Alysia Lazo MD;  Location: [facility];  Service: OB/GYN;  Laterality: N/A;    MOUTH SURGERY         Family history:family history includes No Known Problems in her maternal grandfather, mother, paternal grandmother, and sister.    Social history: reports that she has never smoked. She has never used smokeless tobacco. She reports current drug use. Drug: Marijuana. She reports that she does not drink alcohol.    Genetic history:  The patient denies any inherited  genetic diseases or birth defects in herself or her partner's personal history or family.    Objective:   BP (!) 139/97 (BP Location: Left arm, Patient Position: Sitting)     Ultrasound performed. See viewpoint for full ultrasound report.  1. FGR is identified on today's study. The EFW measures 1656 g, plotting at the 5%, and the AC plots at the 2%.  2. Biophysical profile is normal at 8/8 with normal amount of amniotic fluid.  3. Umbilical artery Doppler S/D ratios are normal at 83% with persistent forward flow.     Significant labs/imaging:  Cell free DNA: low risk    Lab Results   Component Value Date     03/10/2025    AST 18 03/10/2025    ALT 14 03/10/2025    CREATININE 0.6 03/10/2025     Prot/Creat Ratio, Urine   Date Value Ref Range Status   2025 0.37 (H) 0.00 - 0.20 Final   2025 0.41 (H) 0.00 - 0.20 Final       ASSESSMENT/PLAN:     24 y.o.  female with IUP at 33w4d     Fetal growth restriction antepartum   Today fetal growth restriction was diagnosed on growth ultrasound, with EFW 5% and AC 2%. No fetal structural abnormalities are identified, and the amniotic fluid volume is normal. A biophysical profile was performed, and the score was reassuring at 8/8. Umbilical artery Doppler studies were also performed, and the S/D ratio is normal.   Defined as either EFW or AC <10%, fetal growth restriction is associated with increased  morbidity, including  ICU admission and stillbirth. A variety of etiologies can contribute to suboptimal fetal growth in the third trimester. The majority of growth restriction is attributable to constitutional make-up. However, chromosomal and genetic anomalies, infectious causes and uteroplacental dysfunction can also cause lagging fetal growth. Underlying maternal comorbidities, such as hypertensive disease or autoimmune disorders, increase the risk of restricted fetal growth. Unfortunately, our ability to discern constitutional from  pathological is limited, meaning we cast a wide net to hopefully identify all at-risk pregnancies.   Pregnancies diagnosed with fetal growth restriction stratify into an intensive antepartum fetal surveillance that includes twice weekly antepartum fetal surveillance (typically BPP once weekly and NST on another day) and weekly umbilical artery doppler interrogation (typically paired with the BPP). Based on those assessments and degree of growth restriction, delivery is recommended:  - 38w0d - 39w0d: AC <9% or EFW 3-9% with normal UA Doppler S/D ratio, no co-morbidities,  surveillance is reassuring   - 37w0d - 37w6d: EFW <3% -or- EFW 3-9% with UA Doppler S/D > 95%  - 33w0d - 34w0d: UA Dopplers with Absent End Diastolic Flow  - 30w0d - 32w0d: UA Dopplers with Reversed End Diastolic Flow      Recommendations:  1. Twice weekly antepartum fetal surveillance (typically BPP+UA Dopplers alternating with NST)  2. Weekly umbilical artery doppler interrogation  3. Follow up fetal growth assessment in 3 weeks  4. Delivery timing based on current findings would be tentatively for 37 weeks in the context of preeclampsia without severe features, though the battery of testing may speak otherwise    Preeclampsia, third trimester  Without severe features at this junction  BP non-severe today, pt remains without symptoms     As pregnancy associated hypertension can evolve in severity, she remains at risk to develop severe features in the coming weeks. So long as BPs remain < 160/< 110 and lab/exam/symptoms remain stable, GHTN & preeclampsia without severe features can be managed expectantly with weekly prenatal visits and twice weekly prenatal testing (BPPs and/or NSTs with weekly fluid checks). We recommend against initiation of antihypertensives, particularly in the outpatient setting; if antihypertensives are needed to maintain BP <160/< 110, she meets criteria for severe disease and should be managed inpatient through  delivery. For non-severe pregnancy associated hypertension, delivery timing is 37 weeks. Severe features warrant inpatient management, with planned delivery at 34 weeks or at time of diagnosis if later. Hypertensive disorders of pregnancy increase risks for poor outcomes, in current/future pregnancies, as well as for long term cardiac related mortality. Recurrence can be prevented in future pregnancy with initiation of ASA 81 mg daily at 12 weeks gestation. Prevention of long term morbidities, including chronic hypertension and coronary artery disease, will depend on establishing regular care with a primary care provider.    Recommendations:  1. Can continue expectant outpatient management through 37w unless patient develops:  - Severe HA non-responsive to acetaminophen, visual disturbances, acute onset shortness of breath or RUQ pain  - Persistent SBP > 160 or DBP > 110  - Laboratory evidence of end organ failure, including thrombocytopenia < 100K, elevated Cr (2x baseline levels or > 1.1), elevated transaminases (2x upper limit of normal)  - Other findings suggestive of severe disease such as elevated lactate dehydrogenase or evidence of pulmonary edema  - In such scenarios the target delivery date is 34w  2. Recommend against the outpatient use of antihypertensives for pregnancy associated hypertension  - If inpatient medications are required, we generally recommend titration of blood pressure medications after 32w once patient is through the steroid window  3. While expert recommendations call for weekly serum assessment, our system data have demonstrated no benefit to serial lab assessment in the absence of new onset signs/symptoms of escalating blood pressures for patients with PIH managed outpatient  4. For future pregnancies, recommend initiation of ASA 81 mg daily at 12 weeks  5. Recommend patient establish primary care provider if she has not already done so      FOLLOW UP:   F/u in 3 weeks for US/MFM  visit          Av Azul III  Maternal-Fetal Medicine    Electronically Signed by Av Azul III March 11, 2025

## 2025-03-16 ENCOUNTER — HOSPITAL ENCOUNTER (EMERGENCY)
Facility: OTHER | Age: 25
Discharge: HOME | End: 2025-03-16
Attending: OBSTETRICS & GYNECOLOGY
Payer: MEDICAID

## 2025-03-16 VITALS
DIASTOLIC BLOOD PRESSURE: 91 MMHG | SYSTOLIC BLOOD PRESSURE: 132 MMHG | OXYGEN SATURATION: 99 % | TEMPERATURE: 98 F | RESPIRATION RATE: 18 BRPM | HEART RATE: 81 BPM

## 2025-03-16 DIAGNOSIS — Z3A.34 34 WEEKS GESTATION OF PREGNANCY: ICD-10-CM

## 2025-03-16 DIAGNOSIS — I10 HYPERTENSION, UNSPECIFIED TYPE: Primary | ICD-10-CM

## 2025-03-16 DIAGNOSIS — O14.93 PRE-ECLAMPSIA IN THIRD TRIMESTER: ICD-10-CM

## 2025-03-16 LAB
BILIRUBIN, POC UA: NEGATIVE
BLOOD, POC UA: ABNORMAL
CLARITY, UA: CLEAR
COLOR, UA: YELLOW
GLUCOSE, POC UA: NEGATIVE
KETONES, POC UA: ABNORMAL
LEUKOCYTE EST, POC UA: NEGATIVE
NITRITE, POC UA: NEGATIVE
PH UR STRIP: 6.5 [PH] (ref 5–8)
PROTEIN, POC UA: >=300 MG/DL
SPECIFIC GRAVITY, POC UA: >=1.03 (ref 1–1.03)
UROBILINOGEN, POC UA: 1 E.U./DL

## 2025-03-16 PROCEDURE — 59025 FETAL NON-STRESS TEST: CPT | Mod: 26,,, | Performed by: OBSTETRICS & GYNECOLOGY

## 2025-03-16 PROCEDURE — 59025 FETAL NON-STRESS TEST: CPT

## 2025-03-16 PROCEDURE — 99284 EMERGENCY DEPT VISIT MOD MDM: CPT | Mod: 25

## 2025-03-16 PROCEDURE — 99284 EMERGENCY DEPT VISIT MOD MDM: CPT | Mod: 25,,, | Performed by: OBSTETRICS & GYNECOLOGY

## 2025-03-16 NOTE — ED PROVIDER NOTES
Encounter Date: 3/16/2025       History     Chief Complaint   Patient presents with    Hypertension     Julius Peguero is a 24 y.o.  at 34w2d who presents to the OB ED today (2025) with CC of elevated bp on connected moms.      Pt reports to ed after repeated elevated BPS on connected moms in the 170s, prompting her to come to ED for further evaluation. Pt also endorses headache, 7/10 prior to coming to ED, however she took 1gr tylenol and the headache completely resolved on appearing to [facility]. Otherwise no other complaints. Denies current headache, dizziness, SOB, palpitations, scotomas, vision changes, GI distress, or leakage of fluids       She reports - vaginal bleeding, - leakage of fluid, and - contractions.   She reports good fetal movement.  This pregnancy is complicated by preE w/o SF, FGR .            Review of patient's allergies indicates:  No Known Allergies  No past medical history on file.  Past Surgical History:   Procedure Laterality Date    LAPAROSCOPIC SURGICAL REMOVAL OF CYST OF OVARY N/A 2022    Procedure: EXCISION, CYST, OVARY, LAPAROSCOPIC;  Surgeon: Alysia Lazo MD;  Location: [facility];  Service: OB/GYN;  Laterality: N/A;    MOUTH SURGERY       Family History   Problem Relation Name Age of Onset    No Known Problems Paternal Grandmother      No Known Problems Maternal Grandfather      No Known Problems Mother      No Known Problems Sister      Miscarriages / Stillbirths Neg Hx       labor Neg Hx      Stroke Neg Hx      Colon cancer Neg Hx       Social History[1]  Review of Systems   Constitutional:  Negative for chills and fatigue.   Respiratory:  Negative for shortness of breath.    Cardiovascular:  Negative for chest pain.   Gastrointestinal:  Negative for constipation, diarrhea, nausea and vomiting.   Genitourinary:  Negative for dysuria.   Neurological:  Positive for headaches. Negative for light-headedness.       Physical Exam     Initial Vitals   BP Pulse  Resp Temp SpO2   25 0312 25 0303 25 0307 25 0307 25 0303   (!) 136/91 91 18 97.7 °F (36.5 °C) 100 %      MAP       --                  Physical Exam    Constitutional: She appears well-developed and well-nourished. No distress.   HENT:   Head: Normocephalic and atraumatic.   Eyes: Conjunctivae and EOM are normal.   Neck:   Normal range of motion.  Cardiovascular:  Normal rate and regular rhythm.           Pulmonary/Chest: Breath sounds normal. No respiratory distress.   Abdominal: Abdomen is soft. She exhibits no distension. There is no abdominal tenderness.   Musculoskeletal:         General: Normal range of motion.      Cervical back: Normal range of motion.     Neurological: She is alert and oriented to person, place, and time.   Skin: Skin is warm and dry.   Psychiatric: She has a normal mood and affect. Thought content normal.         ED Course   Obtain Fetal nonstress test (NST)    Date/Time: 3/16/2025 3:18 AM    Performed by: Ofelia Denson MD  Authorized by: Ofelia Denson MD    Nonstress Test:     Variability:  6-25 BPM    Decelerations:  None    Accelerations:  15 bpm    Baseline:  135    Contractions:  Not present  Biophysical Profile:     Overall Impression:  Reassuring    Labs Reviewed   POCT URINALYSIS W/O SCOPE - Abnormal       Result Value    Spec Grav UA >=1.030 (*)     PH, UA 6.5      Protein, UA >=300 (*)     Glucose, UA Negative      Ketones, UA Trace (*)     Bilirubin, UA Negative      Blood, UA Trace-intact (*)     Leukocytes, UA Negative      Nitrite, UA Negative      Urobilinogen, UA 1.0      Color, UA POC Yellow      Clarity, UA, POC Clear            Imaging Results    None          Medications - No data to display  Medical Decision Making  Julius Peguero is a 24 y.o.  at 34w2d who presents to the OB ED today (2025) with CC of elevated bp on connected moms.      preE w/o SF  -pt reports to [facility] with complaints of SBP in 170s at home and headache,  previouslu 7/10, completely resolved by tylenol prior to admission into ED  -VSS as above   -NST reactive and reassuring   -toco quiet   -1 hr BP monitoring WNL as above   -Denies preE symptoms       PT deemed stable for discharge with strict return to ED precautions discussed at bedside     Fara Denson MD (Mary)   Obstetrics and Gynecology, PGY1                Attending Attestation:   Physician Attestation Statement for Resident:  As the supervising MD   Physician Attestation Statement: I have personally seen and examined this patient.   I agree with the above history.  -:   As the supervising MD I agree with the above PE.     As the supervising MD I agree with the above treatment, course, plan, and disposition.   I was personally present during the critical portions of the procedure(s) performed by the resident and was immediately available in the ED to provide services and assistance as needed during the entire procedure.              Attending ED Notes:   I have personally seen and examined the patient. I agree with the resident's note . Questions welcomed and answered to patient satisfaction.      @ 34w2d presenting for headache and elevated BP.   Headache resolved with medication prior to presentation  Bps in [facility] nomotensive with one diastolic 91. Otherwise 120-130/80s  NST: reactive and reassuring     Agree with discharge to home, and given the following instructions: Monitor blood pressures and symptoms at home. Return immediately to [facility] if blood pressure greater than 160/110 on two consecutive readings or if experiencing the following symptoms: headache not relieved with tylenol, dizziness, visual changes, chest pain, shortness of breath, nausea/ vomiting, abdominal pain or vaginal bleeding.     Return to clinic  as scheduled .     Jessica Delgado MD  3/17/2025 9:41 AM                                 Clinical Impression:  Final diagnoses:  [I10] Hypertension, unspecified type  (Primary)  [O14.93] Pre-eclampsia in third trimester  [Z3A.34] 34 weeks gestation of pregnancy          ED Disposition Condition    Discharge Stable          ED Prescriptions    None       Follow-up Information       Follow up With Specialties Details Why Contact Info    Alysia Lazo MD Obstetrics and Gynecology On 3/24/2025  [address] [facility] [address]  [phone]      [facility] - Emergency Dept (Obstetrics) Emergency Medicine Go to  If symptoms worsen [address] [place] [address] [phone]               Ofelia Denson MD  Resident  03/16/25 0639         [1]   Social History  Tobacco Use    Smoking status: Never    Smokeless tobacco: Never   Substance Use Topics    Alcohol use: No    Drug use: Yes     Types: Marijuana        Jessica Granados MD  03/17/25 [id number]

## 2025-03-16 NOTE — DISCHARGE INSTRUCTIONS
Call clinic [phone] or L & D after hours at [phone] for vaginal bleeding, leakage of fluids, regular contractions every 5 mins for 2 hours, decreased fetal movements ( 10 kicks in 2 hours), headache not relieved by Tylenol, blurry vision, or temp of 100.4 or greater.  Begin doing fetal kick counts, at least 10 movements in 2 hours starting at 28 weeks gestation.  Keep next clinic appointment

## 2025-03-17 ENCOUNTER — PATIENT MESSAGE (OUTPATIENT)
Dept: OBSTETRICS AND GYNECOLOGY | Facility: CLINIC | Age: 25
End: 2025-03-17
Payer: MEDICAID

## 2025-03-17 ENCOUNTER — HOSPITAL ENCOUNTER (OUTPATIENT)
Dept: PERINATAL CARE | Facility: OTHER | Age: 25
Discharge: HOME | End: 2025-03-17
Attending: OBSTETRICS & GYNECOLOGY
Payer: MEDICAID

## 2025-03-17 DIAGNOSIS — O36.5990 PREGNANCY AFFECTED BY FETAL GROWTH RESTRICTION: ICD-10-CM

## 2025-03-17 PROCEDURE — 76820 UMBILICAL ARTERY ECHO: CPT | Mod: 26,,, | Performed by: OBSTETRICS & GYNECOLOGY

## 2025-03-17 PROCEDURE — 76820 UMBILICAL ARTERY ECHO: CPT

## 2025-03-17 PROCEDURE — 76819 FETAL BIOPHYS PROFIL W/O NST: CPT | Mod: 26,,, | Performed by: OBSTETRICS & GYNECOLOGY

## 2025-03-17 PROCEDURE — 76819 FETAL BIOPHYS PROFIL W/O NST: CPT

## 2025-03-20 ENCOUNTER — HOSPITAL ENCOUNTER (OUTPATIENT)
Dept: PERINATAL CARE | Facility: OTHER | Age: 25
Discharge: HOME | End: 2025-03-20
Attending: OBSTETRICS & GYNECOLOGY
Payer: MEDICAID

## 2025-03-20 ENCOUNTER — HOSPITAL ENCOUNTER (EMERGENCY)
Facility: OTHER | Age: 25
Discharge: HOME | End: 2025-03-20
Attending: OBSTETRICS & GYNECOLOGY
Payer: MEDICAID

## 2025-03-20 VITALS
DIASTOLIC BLOOD PRESSURE: 97 MMHG | HEART RATE: 76 BPM | RESPIRATION RATE: 18 BRPM | OXYGEN SATURATION: 100 % | SYSTOLIC BLOOD PRESSURE: 136 MMHG | TEMPERATURE: 98 F

## 2025-03-20 DIAGNOSIS — Z3A.34 34 WEEKS GESTATION OF PREGNANCY: ICD-10-CM

## 2025-03-20 DIAGNOSIS — O14.93 PRE-ECLAMPSIA IN THIRD TRIMESTER: Primary | ICD-10-CM

## 2025-03-20 DIAGNOSIS — O14.93 PREECLAMPSIA, THIRD TRIMESTER: ICD-10-CM

## 2025-03-20 LAB
ALBUMIN SERPL BCP-MCNC: 2.6 G/DL (ref 3.5–5.2)
ALP SERPL-CCNC: 195 U/L (ref 40–150)
ALT SERPL W/O P-5'-P-CCNC: 13 U/L (ref 10–44)
ANION GAP SERPL CALC-SCNC: 8 MMOL/L (ref 8–16)
AST SERPL-CCNC: 15 U/L (ref 10–40)
BASOPHILS # BLD AUTO: 0.03 K/UL (ref 0–0.2)
BASOPHILS NFR BLD: 0.3 % (ref 0–1.9)
BILIRUB SERPL-MCNC: 0.2 MG/DL (ref 0.1–1)
BILIRUBIN, POC UA: NEGATIVE
BLOOD, POC UA: ABNORMAL
BUN SERPL-MCNC: 7 MG/DL (ref 6–20)
CALCIUM SERPL-MCNC: 9 MG/DL (ref 8.7–10.5)
CHLORIDE SERPL-SCNC: 109 MMOL/L (ref 95–110)
CLARITY, UA: CLEAR
CO2 SERPL-SCNC: 21 MMOL/L (ref 23–29)
COLOR, UA: YELLOW
CREAT SERPL-MCNC: 0.6 MG/DL (ref 0.5–1.4)
DIFFERENTIAL METHOD BLD: ABNORMAL
EOSINOPHIL # BLD AUTO: 0 K/UL (ref 0–0.5)
EOSINOPHIL NFR BLD: 0.4 % (ref 0–8)
ERYTHROCYTE [DISTWIDTH] IN BLOOD BY AUTOMATED COUNT: 14.2 % (ref 11.5–14.5)
EST. GFR  (NO RACE VARIABLE): >60 ML/MIN/1.73 M^2
GLUCOSE SERPL-MCNC: 79 MG/DL (ref 70–110)
GLUCOSE, POC UA: NEGATIVE
HCT VFR BLD AUTO: 31.5 % (ref 37–48.5)
HGB BLD-MCNC: 10.7 G/DL (ref 12–16)
IMM GRANULOCYTES # BLD AUTO: 0.04 K/UL (ref 0–0.04)
IMM GRANULOCYTES NFR BLD AUTO: 0.4 % (ref 0–0.5)
KETONES, POC UA: NEGATIVE
LEUKOCYTE EST, POC UA: NEGATIVE
LYMPHOCYTES # BLD AUTO: 1.7 K/UL (ref 1–4.8)
LYMPHOCYTES NFR BLD: 18.5 % (ref 18–48)
MCH RBC QN AUTO: 31 PG (ref 27–31)
MCHC RBC AUTO-ENTMCNC: 34 G/DL (ref 32–36)
MCV RBC AUTO: 91 FL (ref 82–98)
MONOCYTES # BLD AUTO: 0.7 K/UL (ref 0.3–1)
MONOCYTES NFR BLD: 8 % (ref 4–15)
NEUTROPHILS # BLD AUTO: 6.5 K/UL (ref 1.8–7.7)
NEUTROPHILS NFR BLD: 72.4 % (ref 38–73)
NITRITE, POC UA: NEGATIVE
NRBC BLD-RTO: 0 /100 WBC
PH UR STRIP: 6 [PH] (ref 5–8)
PLATELET # BLD AUTO: 248 K/UL (ref 150–450)
PMV BLD AUTO: 10.7 FL (ref 9.2–12.9)
POTASSIUM SERPL-SCNC: 4 MMOL/L (ref 3.5–5.1)
PROT SERPL-MCNC: 6.5 G/DL (ref 6–8.4)
PROTEIN, POC UA: >=300 MG/DL
RBC # BLD AUTO: 3.45 M/UL (ref 4–5.4)
SODIUM SERPL-SCNC: 138 MMOL/L (ref 136–145)
SPECIFIC GRAVITY, POC UA: 1.02 (ref 1–1.03)
UROBILINOGEN, POC UA: 0.2 E.U./DL
WBC # BLD AUTO: 8.99 K/UL (ref 3.9–12.7)

## 2025-03-20 PROCEDURE — 99284 EMERGENCY DEPT VISIT MOD MDM: CPT | Mod: 25

## 2025-03-20 PROCEDURE — 59025 FETAL NON-STRESS TEST: CPT | Mod: 26,,, | Performed by: OBSTETRICS & GYNECOLOGY

## 2025-03-20 PROCEDURE — 80053 COMPREHEN METABOLIC PANEL: CPT

## 2025-03-20 PROCEDURE — 85025 COMPLETE CBC W/AUTO DIFF WBC: CPT

## 2025-03-20 PROCEDURE — 25000003 PHARM REV CODE 250

## 2025-03-20 PROCEDURE — 59025 FETAL NON-STRESS TEST: CPT | Mod: 76

## 2025-03-20 PROCEDURE — 59025 FETAL NON-STRESS TEST: CPT

## 2025-03-20 RX ORDER — ACETAMINOPHEN 500 MG
1000 TABLET ORAL ONCE
Status: COMPLETED | OUTPATIENT
Start: 2025-03-20 | End: 2025-03-20

## 2025-03-20 RX ADMIN — ACETAMINOPHEN 1000 MG: 500 TABLET ORAL at 12:03

## 2025-03-20 NOTE — ED PROVIDER NOTES
Encounter Date: 3/20/2025       History     Chief Complaint   Patient presents with    Hypertension    Headache     HPI  Julius Peguero is a 24 y.o. [id number] at 34w6d presents complaining of headache and elevated blood pressures at home. Has not taken any medications for headache. Denies chest pain, shortness of breath, scotoma, RUQ pain, or swelling.      This IUP is complicated by PreE w/o SF, Anemia, FGR w/ normal dopplers.  Patient denies contractions, denies vaginal bleeding, denies LOF.   Fetal Movement: normal.      Review of patient's allergies indicates:  No Known Allergies  No past medical history on file.  Past Surgical History:   Procedure Laterality Date    LAPAROSCOPIC SURGICAL REMOVAL OF CYST OF OVARY N/A 2022    Procedure: EXCISION, CYST, OVARY, LAPAROSCOPIC;  Surgeon: Alysia Lazo MD;  Location: [facility];  Service: OB/GYN;  Laterality: N/A;    MOUTH SURGERY       Family History   Problem Relation Name Age of Onset    No Known Problems Paternal Grandmother      No Known Problems Maternal Grandfather      No Known Problems Mother      No Known Problems Sister      Miscarriages / Stillbirths Neg Hx       labor Neg Hx      Stroke Neg Hx      Colon cancer Neg Hx       Social History[1]  Review of Systems   Constitutional:  Negative for chills, fever and unexpected weight change.   Respiratory:  Negative for cough and shortness of breath.    Cardiovascular:  Negative for chest pain and palpitations.   Gastrointestinal:  Negative for abdominal distention, abdominal pain, constipation and diarrhea.   Endocrine: Negative for cold intolerance and heat intolerance.   Genitourinary:  Negative for difficulty urinating, dyspareunia, dysuria, genital sores, pelvic pain, urgency and vaginal pain.   Neurological:  Positive for headaches. Negative for dizziness, syncope and weakness.   Hematological:  Does not bruise/bleed easily.       Physical Exam     Initial Vitals   BP Pulse Resp Temp SpO2    03/20/25 1206 03/20/25 1201 03/20/25 1201 03/20/25 1201 03/20/25 1201   (!) 143/100 86 18 97.8 °F (36.6 °C) 100 %      MAP       --                Physical Exam    Vitals reviewed.  Constitutional: She appears well-developed and well-nourished.   HENT:   Head: Normocephalic.   Eyes: Conjunctivae are normal. Pupils are equal, round, and reactive to light.   Neck:   Normal range of motion.  Cardiovascular:  Normal rate.           Pulmonary/Chest: Effort normal. No respiratory distress.   Abdominal: Abdomen is soft. There is no abdominal tenderness.   No right CVA tenderness.  No left CVA tenderness. There is no rebound and no guarding.   Musculoskeletal:         General: Normal range of motion.      Cervical back: Normal range of motion.     Neurological: She is alert and oriented to person, place, and time.   Skin: Skin is warm and dry. Capillary refill takes less than 2 seconds.   Psychiatric: She has a normal mood and affect. Her behavior is normal. Judgment and thought content normal.         ED Course   Obtain Fetal nonstress test (NST)    Date/Time: 3/20/2025 12:15 PM    Performed by: Audrey Robb MD  Authorized by: Audrey Robb MD    Nonstress Test:     Variability:  6-25 BPM    Decelerations:  None    Baseline:  140    Uterine Irritability: No      Contractions:  Not present  Biophysical Profile:     Nonstress Test Interpretation: reactive      Overall Impression:  Reassuring    Labs Reviewed   CBC W/ AUTO DIFFERENTIAL - Abnormal       Result Value    WBC 8.99      RBC 3.45 (*)     Hemoglobin 10.7 (*)     Hematocrit 31.5 (*)     MCV 91      MCH 31.0      MCHC 34.0      RDW 14.2      Platelets 248      MPV 10.7      Immature Granulocytes 0.4      Gran # (ANC) 6.5      Immature Grans (Abs) 0.04      Lymph # 1.7      Mono # 0.7      Eos # 0.0      Baso # 0.03      nRBC 0      Gran % 72.4      Lymph % 18.5      Mono % 8.0      Eosinophil % 0.4      Basophil % 0.3      Differential Method Automated      COMPREHENSIVE METABOLIC PANEL - Abnormal    Sodium 138      Potassium 4.0      Chloride 109      CO2 21 (*)     Glucose 79      BUN 7      Creatinine 0.6      Calcium 9.0      Total Protein 6.5      Albumin 2.6 (*)     Total Bilirubin 0.2      Alkaline Phosphatase 195 (*)     AST 15      ALT 13      eGFR >60      Anion Gap 8     POCT URINALYSIS W/O SCOPE - Abnormal    Spec Grav UA 1.020      PH, UA 6.0      Protein, UA >=300 (*)     Glucose, UA Negative      Ketones, UA Negative      Bilirubin, UA Negative      Blood, UA Trace-lysed (*)     Leukocytes, UA Negative      Nitrite, UA Negative      Urobilinogen, UA 0.2      Color, UA POC Yellow      Clarity, UA, POC Clear            Imaging Results    None          Medications   acetaminophen tablet 1,000 mg (1,000 mg Oral Given 3/20/25 1224)     Medical Decision Making  Amount and/or Complexity of Data Reviewed  Labs: ordered.    Risk  OTC drugs.              Attending Attestation:   Physician Attestation Statement for Resident:  As the supervising MD   Physician Attestation Statement: I have personally seen and examined this patient.   I agree with the above history.  -:   As the supervising MD I agree with the above PE.     As the supervising MD I agree with the above treatment, course, plan, and disposition.   -:     See ED course.  Agree with discharge to home.    Lyubov Haq MD,BLANE  Date and Time: 03/23/2025 10:13 AM  OB Hospitalist    I was personally present during the critical portions of the procedure(s) performed by the resident and was immediately available in the ED to provide services and assistance as needed during the entire procedure.   I have reviewed the following: old records at this facility.                ED Course as of 03/23/25 1013   u Mar 20, 2025   1233 Patient is a 24 y.o. G1 at 34 6/7 weeks with pre-E w/o SF presents to the [facility] with elevated BP today and HA.  Tylenol given for HA and BP in mild range.  NST reactive and  reassuring, toco with no contractions.  Pre-E labs repeated.  Patient also with a 55# weight gain this pregnancy and FGR. [CD]      ED Course User Index  [CD] Lyubov Haq MD               Medical Decision Making:   Initial Assessment:   1. PreE without Severe Features:   - Mild range blood pressures while in ED; continue to use [employer] [facility] at home   - Headache 5/10; resolved with tylenol 1000 mg  - CBC/CMP WNL  - PCR has previously met criteria for PreE w/o SF; do not need to collect  - NST RR; TOCO no contractions noted   - In brief, patient with pregnancy complicated by pre eclampsia without severe features who presents for headache and elevated blood pressures. In OB ED, blood pressures mild range and headache resolved with tylenol. Discussed continuation of blood pressure monitoring at home and meds to take for headache. Patient with follow-up on Monday with primary provider. Stable for discharge. Return precautions given. Discussed with OB ED staff, patient to be discharge.                  Clinical Impression:  Final diagnoses:  [O14.93] Pre-eclampsia in third trimester (Primary)  [Z3A.34] 34 weeks gestation of pregnancy          ED Disposition Condition    Discharge Stable          ED Prescriptions    None       Follow-up Information    None            Audrey Robb MD  Resident  03/20/25 1321         [1]   Social History  Tobacco Use    Smoking status: Never    Smokeless tobacco: Never   Substance Use Topics    Alcohol use: No    Drug use: Yes     Types: Marijuana        Lyubov Haq MD  03/23/25 101

## 2025-03-20 NOTE — DISCHARGE INSTRUCTIONS
Contact your primary OB or after hours at [phone] if you experience any of the following:    Contractions every 3-5 minutes for 2 or more hours.   A sudden gush or constant leaking of fluid.  Heavy vaginal bleeding.   If you're not feeling your baby move as much or not at all.  If you experience a constant headache, blurry vision, pain underneath your right rib, or sudden swelling of your hands, feet, and face.     Remember to stay hydrated; drink 8-10 bottles of water a day.     Maintain all follow-up appointments.

## 2025-03-20 NOTE — ED NOTES
PC ratio order discontinued per Dr. Robb after specimen collection.  Pt has pre-existing proteinuria.

## 2025-03-21 ENCOUNTER — PATIENT MESSAGE (OUTPATIENT)
Dept: OTHER | Facility: OTHER | Age: 25
End: 2025-03-21
Payer: MEDICAID

## 2025-03-21 NOTE — PROGRESS NOTES
Pregnancy dating, labs, ultrasound reports, prenatal testing, and problem list; prior records and results; and available outside records were reviewed and updated in EMR.  Pertinent findings were noted below.    Reason for Visit   Routine Prenatal Visit    HPI   24 y.o., at 35w3d by Estimated Date of Delivery: 25    Contractions: No   Bleeding: No   Loss of fluid: No   Fetal movement: Yes   Nausea: No   Vomiting: No   Headache: No, No visual changes, no RUQ pain     Exam   /88   Wt 82 kg (180 lb 12.4 oz)   BMI 31.03 kg/m²     TW#  GENERAL: No acute distress  ABD: Gravid  S<D  SVE: 1.5, mid position, soft    Assessment and Plan   Preeclampsia, third trimester    Supervision of high risk pregnancy, antepartum  -     Group B Streptococcus, PCR  -     HIV 1/2 Ag/Ab (4th Gen); Future; Expected date: 2025  -     CBC Auto Differential; Future; Expected date: 2025  -     Treponema Pallidium Antibodies IgG, IgM; Future; Expected date: 2025  -     Comprehensive Metabolic Panel; Future; Expected date: 2025    Fetal growth restriction antepartum      GBS/consents today  3T labs w/ PreE labs this week  Continue PNT until delivery  MFM appt with repeat growth scheduled   IOL scheduled at 37.0 for FGR and PreE - 25 - instructions given     labor, Pain and bleeding, preE, and fetal movement count precautions given  Follow-up: 1 week

## 2025-03-24 ENCOUNTER — ROUTINE PRENATAL (OUTPATIENT)
Dept: OBSTETRICS AND GYNECOLOGY | Facility: CLINIC | Age: 25
End: 2025-03-24
Payer: MEDICAID

## 2025-03-24 ENCOUNTER — HOSPITAL ENCOUNTER (OUTPATIENT)
Dept: PERINATAL CARE | Facility: OTHER | Age: 25
Discharge: HOME | End: 2025-03-24
Attending: OBSTETRICS & GYNECOLOGY
Payer: MEDICAID

## 2025-03-24 VITALS
BODY MASS INDEX: 31.03 KG/M2 | DIASTOLIC BLOOD PRESSURE: 88 MMHG | SYSTOLIC BLOOD PRESSURE: 124 MMHG | WEIGHT: 180.75 LBS

## 2025-03-24 DIAGNOSIS — O36.5990 PREGNANCY AFFECTED BY FETAL GROWTH RESTRICTION: ICD-10-CM

## 2025-03-24 DIAGNOSIS — O14.93 PREECLAMPSIA, THIRD TRIMESTER: Primary | ICD-10-CM

## 2025-03-24 DIAGNOSIS — O36.5990 FETAL GROWTH RESTRICTION ANTEPARTUM: ICD-10-CM

## 2025-03-24 DIAGNOSIS — O09.90 SUPERVISION OF HIGH RISK PREGNANCY, ANTEPARTUM: ICD-10-CM

## 2025-03-24 PROCEDURE — 76819 FETAL BIOPHYS PROFIL W/O NST: CPT

## 2025-03-24 PROCEDURE — 76820 UMBILICAL ARTERY ECHO: CPT

## 2025-03-24 PROCEDURE — 99999 PR PBB SHADOW E&M-EST. PATIENT-LVL II: CPT | Mod: PBBFAC,,, | Performed by: OBSTETRICS & GYNECOLOGY

## 2025-03-24 PROCEDURE — 76820 UMBILICAL ARTERY ECHO: CPT | Mod: 26,,, | Performed by: OBSTETRICS & GYNECOLOGY

## 2025-03-24 PROCEDURE — 76819 FETAL BIOPHYS PROFIL W/O NST: CPT | Mod: 26,,, | Performed by: OBSTETRICS & GYNECOLOGY

## 2025-03-24 PROCEDURE — 99213 OFFICE O/P EST LOW 20 MIN: CPT | Mod: TH,S$PBB,, | Performed by: OBSTETRICS & GYNECOLOGY

## 2025-03-24 PROCEDURE — 99212 OFFICE O/P EST SF 10 MIN: CPT | Mod: PBBFAC,25,TH | Performed by: OBSTETRICS & GYNECOLOGY

## 2025-03-24 NOTE — H&P
HISTORY AND PHYSICAL                                                OBSTETRICS          Subjective:       Julius Peguero is a 24 y.o.  female with IUP with EDC of 2025 here for IOL for preE w/o SF and FGR     Patient denies contractions, denies vaginal bleeding, denies LOF.   Fetal Movement: normal.    This IUP is complicated by PreE w/o SF and FGR.    Review of Systems   Constitutional:  Negative for chills and fever.   Eyes:  Negative for visual disturbance.   Respiratory:  Negative for cough and shortness of breath.    Cardiovascular:  Negative for chest pain and leg swelling.   Gastrointestinal:  Negative for abdominal pain, nausea and vomiting.   Genitourinary:  Negative for dysuria, flank pain, frequency, urgency and vaginal bleeding.   Integumentary:  Negative for breast mass.   Neurological:  Negative for syncope and headaches.   Psychiatric/Behavioral:  Negative for depression. The patient is not nervous/anxious.    All other systems reviewed and are negative.  Breast: Negative for mass and mastodynia      PMHx: History reviewed. No pertinent past medical history.    PSHx:   Past Surgical History:   Procedure Laterality Date    LAPAROSCOPIC SURGICAL REMOVAL OF CYST OF OVARY N/A 2022    Procedure: EXCISION, CYST, OVARY, LAPAROSCOPIC;  Surgeon: Alysia Lazo MD;  Location: [facility];  Service: OB/GYN;  Laterality: N/A;    MOUTH SURGERY         All: Review of patient's allergies indicates:  No Known Allergies    Meds: Prescriptions Prior to Admission[1]    SH: Social History[2]    FH:   Family History   Problem Relation Name Age of Onset    No Known Problems Paternal Grandmother      No Known Problems Maternal Grandfather      No Known Problems Mother      No Known Problems Sister      Miscarriages / Stillbirths Neg Hx       labor Neg Hx      Stroke Neg Hx      Colon cancer Neg Hx         OBHx:   OB History    Para Term  AB Living   1 0 0 0 0 0   SAB IAB Ectopic  Multiple Live Births   0 0 0 0 0      # Outcome Date GA Lbr Mark/2nd Weight Sex Type Anes PTL Lv   1 Current                Objective:       /88   Wt 82 kg (180 lb 12.4 oz)   BMI 31.03 kg/m²     Vitals:    03/24/25 1044   BP: 124/88   Weight: 82 kg (180 lb 12.4 oz)       General:   alert, appears stated age and cooperative, no apparent distress   HENT:  normocephalic, atraumatic   Eyes:  extraocular movements and conjunctivae normal   Neck:  supple, range of motion normal, no thyromegaly   Lungs:   no respiratory distress   Heart:   regular rate   Abdomen:  soft, non-tender, non-distended but gravid, no rebound or guarding   Extremities negative edema, negative erythema   FHT: To be performed upon admission                 TOCO: To be performed upon admission   Presentations: To be performed upon admission   Cervix: To be performed upon admission   Sterile Speculum Exam: N/A    EFW by Leopold's: ~5#    Recent Growth Scan: pending at time of H&P    Lab Review  Blood Type B POS  GBBS: pending at time of H&P  Rubella: Immune  Treponemal Abs: NR  HIV: negative  HepB: negative  HepC: negative       Assessment:     IUP with EDC 4/25/2025 here for medically indicated early term IOL    Plan:      Risks, benefits, alternatives and possible complications have been discussed in detail with the patient.   - Consents signed and to chart  - Admit to Labor and Delivery unit  - Neuraxial/regional per Anesthesia  - Draw CBC, T&S  - IOL plan per L&D team  - EFW pending at time of H&P but leopold ~5#  - Maternal pelvis unproven  - MOC: nuvaring at 6wk, MOF: breast, has pump    PreE w/o SF  - PreE labs on admit  - MgSO4 not indicated at this time  - monitor for sxs of severe features    FGR  - Normal UA dopplers  - PNT has been reassuring  - Management per pediatrics after delivery    Post-Partum Hemorrhage risk - low         [1] (Not in a hospital admission)  [2]   Social History  Socioeconomic History    Marital status: Single    Tobacco Use    Smoking status: Never    Smokeless tobacco: Never   Substance and Sexual Activity    Alcohol use: No    Drug use: Yes     Types: Marijuana    Sexual activity: Yes     Partners: Male     Birth control/protection: None

## 2025-03-24 NOTE — H&P (VIEW-ONLY)
HISTORY AND PHYSICAL                                                OBSTETRICS          Subjective:       Julius Peguero is a 24 y.o.  female with IUP with EDC of 2025 here for IOL for preE w/o SF and FGR     Patient denies contractions, denies vaginal bleeding, denies LOF.   Fetal Movement: normal.    This IUP is complicated by PreE w/o SF and FGR.    Review of Systems   Constitutional:  Negative for chills and fever.   Eyes:  Negative for visual disturbance.   Respiratory:  Negative for cough and shortness of breath.    Cardiovascular:  Negative for chest pain and leg swelling.   Gastrointestinal:  Negative for abdominal pain, nausea and vomiting.   Genitourinary:  Negative for dysuria, flank pain, frequency, urgency and vaginal bleeding.   Integumentary:  Negative for breast mass.   Neurological:  Negative for syncope and headaches.   Psychiatric/Behavioral:  Negative for depression. The patient is not nervous/anxious.    All other systems reviewed and are negative.  Breast: Negative for mass and mastodynia      PMHx: History reviewed. No pertinent past medical history.    PSHx:   Past Surgical History:   Procedure Laterality Date    LAPAROSCOPIC SURGICAL REMOVAL OF CYST OF OVARY N/A 2022    Procedure: EXCISION, CYST, OVARY, LAPAROSCOPIC;  Surgeon: Alysia Lazo MD;  Location: [facility];  Service: OB/GYN;  Laterality: N/A;    MOUTH SURGERY         All: Review of patient's allergies indicates:  No Known Allergies    Meds: Prescriptions Prior to Admission[1]    SH: Social History[2]    FH:   Family History   Problem Relation Name Age of Onset    No Known Problems Paternal Grandmother      No Known Problems Maternal Grandfather      No Known Problems Mother      No Known Problems Sister      Miscarriages / Stillbirths Neg Hx       labor Neg Hx      Stroke Neg Hx      Colon cancer Neg Hx         OBHx:   OB History    Para Term  AB Living   1 0 0 0 0 0   SAB IAB Ectopic  Multiple Live Births   0 0 0 0 0      # Outcome Date GA Lbr Mark/2nd Weight Sex Type Anes PTL Lv   1 Current                Objective:       /88   Wt 82 kg (180 lb 12.4 oz)   BMI 31.03 kg/m²     Vitals:    03/24/25 1044   BP: 124/88   Weight: 82 kg (180 lb 12.4 oz)       General:   alert, appears stated age and cooperative, no apparent distress   HENT:  normocephalic, atraumatic   Eyes:  extraocular movements and conjunctivae normal   Neck:  supple, range of motion normal, no thyromegaly   Lungs:   no respiratory distress   Heart:   regular rate   Abdomen:  soft, non-tender, non-distended but gravid, no rebound or guarding   Extremities negative edema, negative erythema   FHT: To be performed upon admission                 TOCO: To be performed upon admission   Presentations: To be performed upon admission   Cervix: To be performed upon admission   Sterile Speculum Exam: N/A    EFW by Leopold's: ~5#    Recent Growth Scan: pending at time of H&P    Lab Review  Blood Type B POS  GBBS: pending at time of H&P  Rubella: Immune  Treponemal Abs: NR  HIV: negative  HepB: negative  HepC: negative       Assessment:     IUP with EDC 4/25/2025 here for medically indicated early term IOL    Plan:      Risks, benefits, alternatives and possible complications have been discussed in detail with the patient.   - Consents signed and to chart  - Admit to Labor and Delivery unit  - Neuraxial/regional per Anesthesia  - Draw CBC, T&S  - IOL plan per L&D team  - EFW pending at time of H&P but leopold ~5#  - Maternal pelvis unproven  - MOC: nuvaring at 6wk, MOF: breast, has pump    PreE w/o SF  - PreE labs on admit  - MgSO4 not indicated at this time  - monitor for sxs of severe features    FGR  - Normal UA dopplers  - PNT has been reassuring  - Management per pediatrics after delivery    Post-Partum Hemorrhage risk - low         [1] (Not in a hospital admission)  [2]   Social History  Socioeconomic History    Marital status: Single    Tobacco Use    Smoking status: Never    Smokeless tobacco: Never   Substance and Sexual Activity    Alcohol use: No    Drug use: Yes     Types: Marijuana    Sexual activity: Yes     Partners: Male     Birth control/protection: None

## 2025-03-27 ENCOUNTER — HOSPITAL ENCOUNTER (EMERGENCY)
Facility: OTHER | Age: 25
Discharge: HOME | End: 2025-03-27
Attending: OBSTETRICS & GYNECOLOGY
Payer: MEDICAID

## 2025-03-27 ENCOUNTER — HOSPITAL ENCOUNTER (OUTPATIENT)
Dept: PERINATAL CARE | Facility: OTHER | Age: 25
Discharge: HOME | End: 2025-03-27
Attending: OBSTETRICS & GYNECOLOGY
Payer: MEDICAID

## 2025-03-27 VITALS
OXYGEN SATURATION: 100 % | TEMPERATURE: 98 F | DIASTOLIC BLOOD PRESSURE: 97 MMHG | HEART RATE: 78 BPM | SYSTOLIC BLOOD PRESSURE: 150 MMHG | RESPIRATION RATE: 14 BRPM

## 2025-03-27 DIAGNOSIS — O14.93 PREECLAMPSIA, THIRD TRIMESTER: ICD-10-CM

## 2025-03-27 DIAGNOSIS — O14.93 PRE-ECLAMPSIA IN THIRD TRIMESTER: Primary | ICD-10-CM

## 2025-03-27 DIAGNOSIS — Z3A.35 35 WEEKS GESTATION OF PREGNANCY: ICD-10-CM

## 2025-03-27 DIAGNOSIS — O36.5990 FETAL GROWTH RESTRICTION ANTEPARTUM: ICD-10-CM

## 2025-03-27 LAB
ABSOLUTE EOSINOPHIL (OHS): 0.03 K/UL
ABSOLUTE MONOCYTE (OHS): 0.71 K/UL (ref 0.3–1)
ABSOLUTE NEUTROPHIL COUNT (OHS): 5.88 K/UL (ref 1.8–7.7)
ALBUMIN SERPL BCP-MCNC: 2.6 G/DL (ref 3.5–5.2)
ALP SERPL-CCNC: 219 UNIT/L (ref 40–150)
ALT SERPL W/O P-5'-P-CCNC: 16 UNIT/L (ref 10–44)
ANION GAP (OHS): 9 MMOL/L (ref 8–16)
AST SERPL-CCNC: 16 UNIT/L (ref 11–45)
BASOPHILS # BLD AUTO: 0.03 K/UL
BASOPHILS NFR BLD AUTO: 0.4 %
BILIRUB SERPL-MCNC: <0.5 MG/DL (ref 0.1–1)
BUN SERPL-MCNC: 6 MG/DL (ref 6–20)
CALCIUM SERPL-MCNC: 8.8 MG/DL (ref 8.7–10.5)
CHLORIDE SERPL-SCNC: 109 MMOL/L (ref 95–110)
CO2 SERPL-SCNC: 18 MMOL/L (ref 23–29)
CREAT SERPL-MCNC: 0.6 MG/DL (ref 0.5–1.4)
ERYTHROCYTE [DISTWIDTH] IN BLOOD BY AUTOMATED COUNT: 14.7 % (ref 11.5–14.5)
GFR SERPLBLD CREATININE-BSD FMLA CKD-EPI: >60 ML/MIN/1.73/M2
GLUCOSE SERPL-MCNC: 70 MG/DL (ref 70–110)
HCT VFR BLD AUTO: 33.8 % (ref 37–48.5)
HGB BLD-MCNC: 11 GM/DL (ref 12–16)
HIV 1+2 AB+HIV1 P24 AG SERPL QL IA: NEGATIVE
IMM GRANULOCYTES # BLD AUTO: 0.04 K/UL (ref 0–0.04)
IMM GRANULOCYTES NFR BLD AUTO: 0.5 % (ref 0–0.5)
LYMPHOCYTES # BLD AUTO: 1.85 K/UL (ref 1–4.8)
MCH RBC QN AUTO: 30 PG (ref 27–50)
MCHC RBC AUTO-ENTMCNC: 32.5 G/DL (ref 32–36)
MCV RBC AUTO: 92 FL (ref 82–98)
NUCLEATED RBC (/100WBC) (OHS): 0 /100 WBC
PLATELET # BLD AUTO: 293 K/UL (ref 150–450)
PMV BLD AUTO: 10.8 FL (ref 9.2–12.9)
POTASSIUM SERPL-SCNC: 4 MMOL/L (ref 3.5–5.1)
PROT SERPL-MCNC: 6.5 GM/DL (ref 6–8.4)
RBC # BLD AUTO: 3.67 M/UL (ref 4–5.4)
RELATIVE EOSINOPHIL (OHS): 0.4 %
RELATIVE LYMPHOCYTE (OHS): 21.7 % (ref 18–48)
RELATIVE MONOCYTE (OHS): 8.3 % (ref 4–15)
RELATIVE NEUTROPHIL (OHS): 68.7 % (ref 38–73)
SODIUM SERPL-SCNC: 136 MMOL/L (ref 136–145)
T PALLIDUM IGG+IGM SER QL: NEGATIVE
WBC # BLD AUTO: 8.54 K/UL (ref 3.9–12.7)

## 2025-03-27 PROCEDURE — 59025 FETAL NON-STRESS TEST: CPT

## 2025-03-27 PROCEDURE — 76815 OB US LIMITED FETUS(S): CPT | Mod: 26,,, | Performed by: OBSTETRICS & GYNECOLOGY

## 2025-03-27 PROCEDURE — 86593 SYPHILIS TEST NON-TREP QUANT: CPT

## 2025-03-27 PROCEDURE — 76818 FETAL BIOPHYS PROFILE W/NST: CPT

## 2025-03-27 PROCEDURE — 76818 FETAL BIOPHYS PROFILE W/NST: CPT | Mod: 26,,, | Performed by: OBSTETRICS & GYNECOLOGY

## 2025-03-27 PROCEDURE — 59025 FETAL NON-STRESS TEST: CPT | Mod: 26,59,, | Performed by: OBSTETRICS & GYNECOLOGY

## 2025-03-27 PROCEDURE — 99284 EMERGENCY DEPT VISIT MOD MDM: CPT | Mod: 25,,, | Performed by: OBSTETRICS & GYNECOLOGY

## 2025-03-27 PROCEDURE — 87389 HIV-1 AG W/HIV-1&-2 AB AG IA: CPT

## 2025-03-27 PROCEDURE — 99284 EMERGENCY DEPT VISIT MOD MDM: CPT | Mod: 25

## 2025-03-27 PROCEDURE — 85025 COMPLETE CBC W/AUTO DIFF WBC: CPT

## 2025-03-27 PROCEDURE — 82310 ASSAY OF CALCIUM: CPT

## 2025-03-27 NOTE — ED PROVIDER NOTES
Encounter Date: 3/27/2025       History     Chief Complaint   Patient presents with    Hypertension     Patient presented to PNT with severe blood pressures, sent up for labs     Julius Peguero is a 24 y.o. [id number] at 35w6d here for hypertension.  This IUP is complicated by Pre-E w/o SF, FGR.  Patient denies contractions, denies vaginal bleeding, denies LOF.   Fetal Movement: normal    Patient reports from PNT due to elevated BP. She has Pre-E w/o SF this pregnancy.   Patient denies headache, visual changes, RUQ pain, SOB, CP. She endorses LE edema that is stable.           Review of patient's allergies indicates:  No Known Allergies  No past medical history on file.  Past Surgical History:   Procedure Laterality Date    LAPAROSCOPIC SURGICAL REMOVAL OF CYST OF OVARY N/A 2022    Procedure: EXCISION, CYST, OVARY, LAPAROSCOPIC;  Surgeon: Alysia Lazo MD;  Location: [facility];  Service: OB/GYN;  Laterality: N/A;    MOUTH SURGERY       Family History   Problem Relation Name Age of Onset    No Known Problems Paternal Grandmother      No Known Problems Maternal Grandfather      No Known Problems Mother      No Known Problems Sister      Miscarriages / Stillbirths Neg Hx       labor Neg Hx      Stroke Neg Hx      Colon cancer Neg Hx       Social History[1]  Review of Systems   Constitutional:  Negative for chills and fatigue.   HENT:  Negative for congestion and sore throat.    Eyes:  Negative for visual disturbance.   Respiratory:  Negative for chest tightness and shortness of breath.    Cardiovascular:  Positive for leg swelling. Negative for chest pain.   Gastrointestinal:  Negative for abdominal pain.   Genitourinary:  Negative for dysuria, vaginal bleeding and vaginal discharge.   Neurological:  Negative for headaches.       Physical Exam     Initial Vitals [25 1145]   BP Pulse Resp Temp SpO2   (!) 149/95 72 14 98.1 °F (36.7 °C) 100 %      MAP       --         Physical Exam    Vitals  reviewed.  Constitutional: She appears well-developed and well-nourished. No distress.   HENT:   Head: Normocephalic and atraumatic.   Neck: Neck supple.   Normal range of motion.  Cardiovascular:  Normal rate and regular rhythm.           Pulmonary/Chest: Breath sounds normal. No respiratory distress.   Abdominal: There is no abdominal tenderness.   Gravid abdomen   Musculoskeletal:         General: Edema present.      Cervical back: Normal range of motion and neck supple.     Neurological: She is alert and oriented to person, place, and time.   Skin: Skin is warm and dry.   Psychiatric: She has a normal mood and affect.         ED Course   Fetal non-stress test    Date/Time: 3/27/2025 1:31 PM    Performed by: Gisel Huerta MD  Authorized by: Anna Palomares MD    Nonstress Test:     Variability:  6-25 BPM    Decelerations:  None    Accelerations:  15 bpm    Baseline:  140    Contractions:  Not present  Biophysical Profile:     Nonstress Test Interpretation: reactive      Overall Impression:  Reassuring    Labs Reviewed   COMPREHENSIVE METABOLIC PANEL - Abnormal       Result Value    Sodium 136      Potassium 4.0      Chloride 109      CO2 18 (*)     Glucose 70      BUN 6      Creatinine 0.6      Calcium 8.8      Protein Total 6.5      Albumin 2.6 (*)     Bilirubin Total <0.5       (*)     AST 16      ALT 16      Anion Gap 9      eGFR >60     CBC WITH DIFFERENTIAL - Abnormal    WBC 8.54      RBC 3.67 (*)     HGB 11.0 (*)     HCT 33.8 (*)     MCV 92      MCH 30.0      MCHC 32.5      RDW 14.7 (*)     Platelet Count 293      MPV 10.8      Nucleated RBC 0      Neut % 68.7      Lymph % 21.7      Mono % 8.3      Eos % 0.4      Basophil % 0.4      Imm Grans % 0.5      Neut # 5.88      Lymph # 1.85      Mono # 0.71      Eos # 0.03      Baso # 0.03      Imm Grans # 0.04     TREPONEMA PALLIDIUM ANTIBODIES IGG, IGM - Normal    Treponema Pallidum Antibodies IgG, IgM Negative     HIV 1 / 2 ANTIBODY - Normal    HIV   Ag/Ab Negative     CBC W/ AUTO DIFFERENTIAL    Narrative:     The following orders were created for panel order CBC auto differential.  Procedure                               Abnormality         Status                     ---------                               -----------         ------                     CBC with Differential[[id number]]       Abnormal            Final result                 Please view results for these tests on the individual orders.          Imaging Results    None          Medications - No data to display  Medical Decision Making  Julius Peguero is a 24 y.o. [id number] at 35w6d here for hypertension.  Temp:  [98.1 °F (36.7 °C)] 98.1 °F (36.7 °C)  Pulse:  [72-79] 78  Resp:  [14] 14  SpO2:  [100 %] 100 %  BP: (145-151)/() 150/97    NST reactive and reassuring   [place]: quiet    Pre-E w/o SF   -, Cr 0.6, AST/ALT 16/16  -Asymptomatic    Patient without any symptoms or lab abnormalities. Return to ED precautions given for headache, visual changes, RUQ pain, LLE, SOB.   She has follow-up with [facility] on  for  testing and growth US.  Patient stable for discharge at this time.     Gisel Huerta MD  Obstetrics & Gynecology, PGY-1       Amount and/or Complexity of Data Reviewed  Labs: ordered.              Attending Attestation:   Physician Attestation Statement for Resident:  As the supervising MD   Physician Attestation Statement: I have personally seen and examined this patient.   I agree with the above history.  -:   As the supervising MD I agree with the above PE.     As the supervising MD I agree with the above treatment, course, plan, and disposition.   I was personally present during the critical portions of the procedure(s) performed by the resident and was immediately available in the ED to provide services and assistance as needed during the entire procedure.  I have reviewed and agree with the residents interpretation of the following: lab data.  I have reviewed  the following: old records at this facility.            Attending ED Notes:   I saw and examined the patient myself along with the resident. I have personally reviewed pertinent prior records, labs, imaging, and procedures. I have reviewed the documentation and agree with the findings and plan as documented.      at  35w6d with pre-eclampsia without severe features and FGR presenting from PNT for elevated BP. Mild range BPs in OB ED. Asymptomatic. Pre-eclampsia labs without evidence of end organ damage. NST reactive and reassuring, tocometry with rare contractions. Normal MVP on US. No evidence of severe features of pre-eclampsia at this time. Discharge home in stable condition with home BP monitoring and strict return precautions.     Anna Palomares MD FACOG  OB Hospitalist                                 Clinical Impression:  Final diagnoses:  [O14.93] Pre-eclampsia in third trimester (Primary)  [O36.5990] Fetal growth restriction antepartum  [Z3A.35] 35 weeks gestation of pregnancy          ED Disposition Condition    Discharge Stable          ED Prescriptions    None       Follow-up Information       Follow up With Specialties Details Why Contact Info Additional Information    [facility] - Emergency Dept (Obstetrics) Emergency Medicine  If symptoms worsen [address] [facility] [address] [phone]     Alysia Lazo MD Obstetrics and Gynecology  As needed, As scheduled [address] [facility] [id number] [phone]       [facility] - Maternal Fetal Medicine Maternal and Fetal Medicine  As needed, As scheduled [address] [facility] 4th Floor  [facility]-maternal Fetal Medicine  [facility] [address] [phone] Turn at Entrance 1 on [facility] in [facility] and take elevators to Floor 2. Follow signs to Hospital. Take Hospital Elevators to Floor 4 for [address].               Gisel Huerta MD  Resident  25 [phone]         [1]   Social  History  Tobacco Use    Smoking status: Never    Smokeless tobacco: Never   Substance Use Topics    Alcohol use: No    Drug use: Yes     Types: Marijuana        Anna Palomares MD  03/27/25 1400

## 2025-03-27 NOTE — DISCHARGE INSTRUCTIONS
Call clinic [phone] or L & D after hours at [phone] for vaginal bleeding, leakage of fluids, regular contractions every 5 mins for 2 hours, decreased fetal movements ( 10 kicks in 2 hours), headache not relieved by Tylenol, blurry vision, or temp of 100.4 or greater.  Begin doing fetal kick counts, at least 10 movements in 2 hours starting at 28 weeks gestation.  Keep next clinic appointment

## 2025-04-01 ENCOUNTER — ANESTHESIA (OUTPATIENT)
Dept: OBSTETRICS AND GYNECOLOGY | Facility: OTHER | Age: 25
End: 2025-04-01
Payer: MEDICAID

## 2025-04-01 ENCOUNTER — HOSPITAL ENCOUNTER (INPATIENT)
Facility: OTHER | Age: 25
LOS: 2 days | Discharge: HOME | End: 2025-04-03
Attending: OBSTETRICS & GYNECOLOGY | Admitting: STUDENT IN AN ORGANIZED HEALTH CARE EDUCATION/TRAINING PROGRAM
Payer: MEDICAID

## 2025-04-01 ENCOUNTER — ANESTHESIA EVENT (OUTPATIENT)
Dept: OBSTETRICS AND GYNECOLOGY | Facility: OTHER | Age: 25
End: 2025-04-01
Payer: MEDICAID

## 2025-04-01 DIAGNOSIS — Z37.9 VACUUM-ASSISTED VAGINAL DELIVERY: ICD-10-CM

## 2025-04-01 DIAGNOSIS — Z3A.36 36 WEEKS GESTATION OF PREGNANCY: ICD-10-CM

## 2025-04-01 DIAGNOSIS — O14.93 PRE-ECLAMPSIA IN THIRD TRIMESTER: ICD-10-CM

## 2025-04-01 DIAGNOSIS — N93.9 VAGINAL BLEEDING: ICD-10-CM

## 2025-04-01 DIAGNOSIS — O47.9 UTERINE CONTRACTIONS DURING PREGNANCY: ICD-10-CM

## 2025-04-01 DIAGNOSIS — O14.13 SEVERE PRE-ECLAMPSIA IN THIRD TRIMESTER: ICD-10-CM

## 2025-04-01 PROBLEM — O36.5990 FETAL GROWTH RESTRICTION ANTEPARTUM: Status: RESOLVED | Noted: 2025-03-11 | Resolved: 2025-04-01

## 2025-04-01 LAB
ABSOLUTE EOSINOPHIL (OHS): 0.06 K/UL
ABSOLUTE MONOCYTE (OHS): 0.92 K/UL (ref 0.3–1)
ABSOLUTE NEUTROPHIL COUNT (OHS): 6.99 K/UL (ref 1.8–7.7)
ALBUMIN SERPL BCP-MCNC: 2.6 G/DL (ref 3.5–5.2)
ALLENS TEST: ABNORMAL
ALLENS TEST: ABNORMAL
ALP SERPL-CCNC: 223 UNIT/L (ref 40–150)
ALT SERPL W/O P-5'-P-CCNC: 17 UNIT/L (ref 10–44)
ANION GAP (OHS): 11 MMOL/L (ref 8–16)
AST SERPL-CCNC: 27 UNIT/L (ref 11–45)
BASOPHILS # BLD AUTO: 0.05 K/UL
BASOPHILS NFR BLD AUTO: 0.5 %
BILIRUB SERPL-MCNC: 0.2 MG/DL (ref 0.1–1)
BUN SERPL-MCNC: 9 MG/DL (ref 6–20)
CALCIUM SERPL-MCNC: 8.9 MG/DL (ref 8.7–10.5)
CHLORIDE SERPL-SCNC: 108 MMOL/L (ref 95–110)
CO2 SERPL-SCNC: 18 MMOL/L (ref 23–29)
CREAT SERPL-MCNC: 0.6 MG/DL (ref 0.5–1.4)
CREAT UR-MCNC: 101.7 MG/DL (ref 15–325)
ERYTHROCYTE [DISTWIDTH] IN BLOOD BY AUTOMATED COUNT: 15 % (ref 11.5–14.5)
GFR SERPLBLD CREATININE-BSD FMLA CKD-EPI: >60 ML/MIN/1.73/M2
GLUCOSE SERPL-MCNC: 86 MG/DL (ref 70–110)
GROUP & RH: NORMAL
HCO3 UR-SCNC: 22.7 MMOL/L (ref 12–29)
HCO3 UR-SCNC: 23.7 MMOL/L (ref 17–27)
HCT VFR BLD AUTO: 32.4 % (ref 37–48.5)
HCT VFR BLD CALC: 49 %PCV (ref 36–54)
HCT VFR BLD CALC: 49 %PCV (ref 36–54)
HGB BLD-MCNC: 10.9 GM/DL (ref 12–16)
HGB BLD-MCNC: 17 G/DL
HGB BLD-MCNC: 17 G/DL
IMM GRANULOCYTES # BLD AUTO: 0.05 K/UL (ref 0–0.04)
IMM GRANULOCYTES NFR BLD AUTO: 0.5 % (ref 0–0.5)
INDIRECT COOMBS: NORMAL
LYMPHOCYTES # BLD AUTO: 2.38 K/UL (ref 1–4.8)
MCH RBC QN AUTO: 30.5 PG (ref 27–31)
MCHC RBC AUTO-ENTMCNC: 33.6 G/DL (ref 32–36)
MCV RBC AUTO: 91 FL (ref 82–98)
NUCLEATED RBC (/100WBC) (OHS): 0 /100 WBC
PCO2 BLDA: 67.3 MMHG (ref 27–49)
PCO2 BLDA: 77.9 MMHG (ref 32–66)
PH SMN: 7.09 [PH] (ref 7.18–7.38)
PH SMN: 7.14 [PH] (ref 7.25–7.45)
PLATELET # BLD AUTO: 350 K/UL (ref 150–450)
PLATELET BLD QL SMEAR: NORMAL
PMV BLD AUTO: 10.9 FL (ref 9.2–12.9)
PO2 BLDA: 13 MMHG (ref 6–31)
PO2 BLDA: 16 MMHG (ref 17–41)
POC BE: -6 MMOL/L
POC BE: -6 MMOL/L
POC SATURATED O2: 13 %
POC SATURATED O2: 9 %
POC TCO2: 25 MMOL/L (ref 23–27)
POC TCO2: 26 MMOL/L (ref 23–27)
POTASSIUM SERPL-SCNC: 4.4 MMOL/L (ref 3.5–5.1)
PROT SERPL-MCNC: 7 GM/DL (ref 6–8.4)
PROT UR-MCNC: 667 MG/DL
PROT/CREAT UR: 6.56 MG/G{CREAT}
RBC # BLD AUTO: 3.57 M/UL (ref 4–5.4)
RELATIVE EOSINOPHIL (OHS): 0.6 %
RELATIVE LYMPHOCYTE (OHS): 22.8 % (ref 18–48)
RELATIVE MONOCYTE (OHS): 8.8 % (ref 4–15)
RELATIVE NEUTROPHIL (OHS): 66.8 % (ref 38–73)
SAMPLE: ABNORMAL
SAMPLE: ABNORMAL
SITE: ABNORMAL
SITE: ABNORMAL
SODIUM SERPL-SCNC: 137 MMOL/L (ref 136–145)
T PALLIDUM IGG+IGM SER QL: NEGATIVE
WBC # BLD AUTO: 10.45 K/UL (ref 3.9–12.7)

## 2025-04-01 PROCEDURE — 96375 TX/PRO/DX INJ NEW DRUG ADDON: CPT

## 2025-04-01 PROCEDURE — 51702 INSERT TEMP BLADDER CATH: CPT

## 2025-04-01 PROCEDURE — 25000003 PHARM REV CODE 250

## 2025-04-01 PROCEDURE — 88307 TISSUE EXAM BY PATHOLOGIST: CPT | Mod: 26,,, | Performed by: PATHOLOGY

## 2025-04-01 PROCEDURE — 63600175 PHARM REV CODE 636 W HCPCS

## 2025-04-01 PROCEDURE — 96374 THER/PROPH/DIAG INJ IV PUSH: CPT

## 2025-04-01 PROCEDURE — 59409 OBSTETRICAL CARE: CPT | Mod: AT,,, | Performed by: OBSTETRICS & GYNECOLOGY

## 2025-04-01 PROCEDURE — 59025 FETAL NON-STRESS TEST: CPT | Mod: 26,59,, | Performed by: OBSTETRICS & GYNECOLOGY

## 2025-04-01 PROCEDURE — 86593 SYPHILIS TEST NON-TREP QUANT: CPT

## 2025-04-01 PROCEDURE — 99285 EMERGENCY DEPT VISIT HI MDM: CPT | Mod: 25

## 2025-04-01 PROCEDURE — 99900035 HC TECH TIME PER 15 MIN (STAT)

## 2025-04-01 PROCEDURE — 59025 FETAL NON-STRESS TEST: CPT

## 2025-04-01 PROCEDURE — 88307 TISSUE EXAM BY PATHOLOGIST: CPT | Mod: TC | Performed by: OBSTETRICS & GYNECOLOGY

## 2025-04-01 PROCEDURE — 80053 COMPREHEN METABOLIC PANEL: CPT

## 2025-04-01 PROCEDURE — 76815 OB US LIMITED FETUS(S): CPT | Mod: 26,,, | Performed by: OBSTETRICS & GYNECOLOGY

## 2025-04-01 PROCEDURE — 27200710 HC EPIDURAL INFUSION PUMP SET: Performed by: ANESTHESIOLOGY

## 2025-04-01 PROCEDURE — 62326 NJX INTERLAMINAR LMBR/SAC: CPT

## 2025-04-01 PROCEDURE — 85025 COMPLETE CBC W/AUTO DIFF WBC: CPT

## 2025-04-01 PROCEDURE — 72200006 HC VAGINAL DELIVERY LEVEL III

## 2025-04-01 PROCEDURE — 99285 EMERGENCY DEPT VISIT HI MDM: CPT | Mod: 25,,, | Performed by: OBSTETRICS & GYNECOLOGY

## 2025-04-01 PROCEDURE — 11000001 HC ACUTE MED/SURG PRIVATE ROOM

## 2025-04-01 PROCEDURE — 36416 COLLJ CAPILLARY BLOOD SPEC: CPT

## 2025-04-01 PROCEDURE — 86900 BLOOD TYPING SEROLOGIC ABO: CPT

## 2025-04-01 PROCEDURE — C1751 CATH, INF, PER/CENT/MIDLINE: HCPCS | Performed by: ANESTHESIOLOGY

## 2025-04-01 PROCEDURE — 84156 ASSAY OF PROTEIN URINE: CPT

## 2025-04-01 PROCEDURE — 59200 INSERT CERVICAL DILATOR: CPT

## 2025-04-01 RX ORDER — ONDANSETRON 4 MG/1
4 TABLET, ORALLY DISINTEGRATING ORAL ONCE
Status: COMPLETED | OUTPATIENT
Start: 2025-04-01 | End: 2025-04-01

## 2025-04-01 RX ORDER — IBUPROFEN 600 MG/1
600 TABLET ORAL EVERY 6 HOURS
Status: DISCONTINUED | OUTPATIENT
Start: 2025-04-01 | End: 2025-04-03 | Stop reason: HOSPADM

## 2025-04-01 RX ORDER — CEFAZOLIN SODIUM 1 G/3ML
1 INJECTION, POWDER, FOR SOLUTION INTRAMUSCULAR; INTRAVENOUS ONCE
Status: COMPLETED | OUTPATIENT
Start: 2025-04-01 | End: 2025-04-01

## 2025-04-01 RX ORDER — LABETALOL HYDROCHLORIDE 5 MG/ML
20 INJECTION, SOLUTION INTRAVENOUS ONCE
Status: COMPLETED | OUTPATIENT
Start: 2025-04-01 | End: 2025-04-01

## 2025-04-01 RX ORDER — LABETALOL HYDROCHLORIDE 5 MG/ML
40 INJECTION, SOLUTION INTRAVENOUS ONCE
Status: COMPLETED | OUTPATIENT
Start: 2025-04-01 | End: 2025-04-01

## 2025-04-01 RX ORDER — MISOPROSTOL 200 UG/1
800 TABLET ORAL ONCE AS NEEDED
Status: DISCONTINUED | OUTPATIENT
Start: 2025-04-01 | End: 2025-04-01

## 2025-04-01 RX ORDER — OXYTOCIN-SODIUM CHLORIDE 0.9% IV SOLN 30 UNIT/500ML 30-0.9/5 UT/ML-%
10 SOLUTION INTRAVENOUS ONCE AS NEEDED
Status: COMPLETED | OUTPATIENT
Start: 2025-04-01 | End: 2025-04-01

## 2025-04-01 RX ORDER — OXYTOCIN-SODIUM CHLORIDE 0.9% IV SOLN 30 UNIT/500ML 30-0.9/5 UT/ML-%
95 SOLUTION INTRAVENOUS ONCE AS NEEDED
Status: DISCONTINUED | OUTPATIENT
Start: 2025-04-01 | End: 2025-04-03 | Stop reason: HOSPADM

## 2025-04-01 RX ORDER — ACETAMINOPHEN 325 MG/1
650 TABLET ORAL EVERY 6 HOURS PRN
Status: DISCONTINUED | OUTPATIENT
Start: 2025-04-01 | End: 2025-04-01

## 2025-04-01 RX ORDER — FENTANYL/BUPIVACAINE/NS/PF 2MCG/ML-.1
PLASTIC BAG, INJECTION (ML) INJECTION
Status: COMPLETED
Start: 2025-04-01 | End: 2025-04-01

## 2025-04-01 RX ORDER — LABETALOL HYDROCHLORIDE 5 MG/ML
INJECTION, SOLUTION INTRAVENOUS
Status: DISPENSED
Start: 2025-04-01 | End: 2025-04-01

## 2025-04-01 RX ORDER — OXYTOCIN-SODIUM CHLORIDE 0.9% IV SOLN 30 UNIT/500ML 30-0.9/5 UT/ML-%
95 SOLUTION INTRAVENOUS CONTINUOUS PRN
Status: DISCONTINUED | OUTPATIENT
Start: 2025-04-01 | End: 2025-04-01

## 2025-04-01 RX ORDER — IBUPROFEN 600 MG/1
600 TABLET ORAL EVERY 6 HOURS
Qty: 60 TABLET | Refills: 1 | Status: SHIPPED | OUTPATIENT
Start: 2025-04-01

## 2025-04-01 RX ORDER — NIFEDIPINE 30 MG/1
30 TABLET, EXTENDED RELEASE ORAL DAILY
Qty: 30 TABLET | Refills: 11 | Status: SHIPPED | OUTPATIENT
Start: 2025-04-02 | End: 2026-04-02

## 2025-04-01 RX ORDER — METHYLERGONOVINE MALEATE 0.2 MG/ML
200 INJECTION INTRAVENOUS ONCE AS NEEDED
Status: DISCONTINUED | OUTPATIENT
Start: 2025-04-01 | End: 2025-04-03 | Stop reason: HOSPADM

## 2025-04-01 RX ORDER — OXYTOCIN 10 [USP'U]/ML
10 INJECTION, SOLUTION INTRAMUSCULAR; INTRAVENOUS ONCE AS NEEDED
Status: DISCONTINUED | OUTPATIENT
Start: 2025-04-01 | End: 2025-04-01

## 2025-04-01 RX ORDER — MISOPROSTOL 200 UG/1
800 TABLET ORAL ONCE AS NEEDED
Status: DISCONTINUED | OUTPATIENT
Start: 2025-04-01 | End: 2025-04-03 | Stop reason: HOSPADM

## 2025-04-01 RX ORDER — OXYTOCIN-SODIUM CHLORIDE 0.9% IV SOLN 30 UNIT/500ML 30-0.9/5 UT/ML-%
0-32 SOLUTION INTRAVENOUS CONTINUOUS
Status: DISCONTINUED | OUTPATIENT
Start: 2025-04-01 | End: 2025-04-01

## 2025-04-01 RX ORDER — LIDOCAINE HYDROCHLORIDE 10 MG/ML
10 INJECTION, SOLUTION INFILTRATION; PERINEURAL ONCE AS NEEDED
Status: DISCONTINUED | OUTPATIENT
Start: 2025-04-01 | End: 2025-04-01

## 2025-04-01 RX ORDER — ONDANSETRON 8 MG/1
8 TABLET, ORALLY DISINTEGRATING ORAL EVERY 8 HOURS PRN
Status: DISCONTINUED | OUTPATIENT
Start: 2025-04-01 | End: 2025-04-03 | Stop reason: HOSPADM

## 2025-04-01 RX ORDER — FENTANYL/BUPIVACAINE/NS/PF 2MCG/ML-.1
PLASTIC BAG, INJECTION (ML) INJECTION CONTINUOUS PRN
Status: DISCONTINUED | OUTPATIENT
Start: 2025-04-01 | End: 2025-04-01

## 2025-04-01 RX ORDER — TERBUTALINE SULFATE 1 MG/ML
0.25 INJECTION SUBCUTANEOUS ONCE
Status: COMPLETED | OUTPATIENT
Start: 2025-04-01 | End: 2025-04-01

## 2025-04-01 RX ORDER — ACETAMINOPHEN 500 MG
1000 TABLET ORAL ONCE
Status: COMPLETED | OUTPATIENT
Start: 2025-04-01 | End: 2025-04-01

## 2025-04-01 RX ORDER — CARBOPROST TROMETHAMINE 250 UG/ML
250 INJECTION, SOLUTION INTRAMUSCULAR
Status: DISCONTINUED | OUTPATIENT
Start: 2025-04-01 | End: 2025-04-01

## 2025-04-01 RX ORDER — MAGNESIUM SULFATE HEPTAHYDRATE 40 MG/ML
2 INJECTION, SOLUTION INTRAVENOUS CONTINUOUS
Status: DISCONTINUED | OUTPATIENT
Start: 2025-04-01 | End: 2025-04-02

## 2025-04-01 RX ORDER — OXYCODONE HYDROCHLORIDE 10 MG/1
10 TABLET ORAL EVERY 4 HOURS PRN
Status: DISCONTINUED | OUTPATIENT
Start: 2025-04-01 | End: 2025-04-03 | Stop reason: HOSPADM

## 2025-04-01 RX ORDER — BUPIVACAINE HYDROCHLORIDE 2.5 MG/ML
INJECTION, SOLUTION EPIDURAL; INFILTRATION; INTRACAUDAL; PERINEURAL
Status: DISCONTINUED | OUTPATIENT
Start: 2025-04-01 | End: 2025-04-01

## 2025-04-01 RX ORDER — PRENATAL WITH FERROUS FUM AND FOLIC ACID 3080; 920; 120; 400; 22; 1.84; 3; 20; 10; 1; 12; 200; 27; 25; 2 [IU]/1; [IU]/1; MG/1; [IU]/1; MG/1; MG/1; MG/1; MG/1; MG/1; MG/1; UG/1; MG/1; MG/1; MG/1; MG/1
1 TABLET ORAL DAILY
Status: DISCONTINUED | OUTPATIENT
Start: 2025-04-01 | End: 2025-04-03 | Stop reason: HOSPADM

## 2025-04-01 RX ORDER — OXYTOCIN-SODIUM CHLORIDE 0.9% IV SOLN 30 UNIT/500ML 30-0.9/5 UT/ML-%
10 SOLUTION INTRAVENOUS ONCE AS NEEDED
Status: DISCONTINUED | OUTPATIENT
Start: 2025-04-01 | End: 2025-04-01

## 2025-04-01 RX ORDER — HYDROCORTISONE 25 MG/G
CREAM TOPICAL 3 TIMES DAILY PRN
Status: DISCONTINUED | OUTPATIENT
Start: 2025-04-01 | End: 2025-04-03 | Stop reason: HOSPADM

## 2025-04-01 RX ORDER — MAGNESIUM SULFATE HEPTAHYDRATE 40 MG/ML
4 INJECTION, SOLUTION INTRAVENOUS ONCE
Status: COMPLETED | OUTPATIENT
Start: 2025-04-01 | End: 2025-04-01

## 2025-04-01 RX ORDER — DIPHENHYDRAMINE HYDROCHLORIDE 50 MG/ML
25 INJECTION, SOLUTION INTRAMUSCULAR; INTRAVENOUS EVERY 4 HOURS PRN
Status: DISCONTINUED | OUTPATIENT
Start: 2025-04-01 | End: 2025-04-03 | Stop reason: HOSPADM

## 2025-04-01 RX ORDER — SODIUM CHLORIDE 9 MG/ML
INJECTION, SOLUTION INTRAVENOUS
Status: DISCONTINUED | OUTPATIENT
Start: 2025-04-01 | End: 2025-04-01

## 2025-04-01 RX ORDER — OXYTOCIN-SODIUM CHLORIDE 0.9% IV SOLN 30 UNIT/500ML 30-0.9/5 UT/ML-%
95 SOLUTION INTRAVENOUS CONTINUOUS PRN
Status: DISCONTINUED | OUTPATIENT
Start: 2025-04-01 | End: 2025-04-03 | Stop reason: HOSPADM

## 2025-04-01 RX ORDER — ACETAMINOPHEN 325 MG/1
650 TABLET ORAL EVERY 6 HOURS SCHEDULED
Status: DISCONTINUED | OUTPATIENT
Start: 2025-04-01 | End: 2025-04-03 | Stop reason: HOSPADM

## 2025-04-01 RX ORDER — CALCIUM GLUCONATE 98 MG/ML
1 INJECTION, SOLUTION INTRAVENOUS
Status: DISCONTINUED | OUTPATIENT
Start: 2025-04-01 | End: 2025-04-03 | Stop reason: HOSPADM

## 2025-04-01 RX ORDER — SIMETHICONE 80 MG
1 TABLET,CHEWABLE ORAL 4 TIMES DAILY PRN
Status: DISCONTINUED | OUTPATIENT
Start: 2025-04-01 | End: 2025-04-01

## 2025-04-01 RX ORDER — METHYLERGONOVINE MALEATE 0.2 MG/ML
200 INJECTION INTRAVENOUS ONCE AS NEEDED
Status: DISCONTINUED | OUTPATIENT
Start: 2025-04-01 | End: 2025-04-01

## 2025-04-01 RX ORDER — OXYTOCIN-SODIUM CHLORIDE 0.9% IV SOLN 30 UNIT/500ML 30-0.9/5 UT/ML-%
10 SOLUTION INTRAVENOUS ONCE AS NEEDED
Status: DISCONTINUED | OUTPATIENT
Start: 2025-04-01 | End: 2025-04-03 | Stop reason: HOSPADM

## 2025-04-01 RX ORDER — FENTANYL/BUPIVACAINE/NS/PF 2MCG/ML-.1
PLASTIC BAG, INJECTION (ML) INJECTION CONTINUOUS
Status: DISCONTINUED | OUTPATIENT
Start: 2025-04-01 | End: 2025-04-01

## 2025-04-01 RX ORDER — FAMOTIDINE 10 MG/ML
20 INJECTION, SOLUTION INTRAVENOUS ONCE
Status: DISCONTINUED | OUTPATIENT
Start: 2025-04-01 | End: 2025-04-01

## 2025-04-01 RX ORDER — OXYCODONE HYDROCHLORIDE 5 MG/1
5 TABLET ORAL EVERY 4 HOURS PRN
Status: DISCONTINUED | OUTPATIENT
Start: 2025-04-01 | End: 2025-04-03 | Stop reason: HOSPADM

## 2025-04-01 RX ORDER — DOCUSATE SODIUM 100 MG/1
200 CAPSULE, LIQUID FILLED ORAL 2 TIMES DAILY PRN
Status: DISCONTINUED | OUTPATIENT
Start: 2025-04-01 | End: 2025-04-03

## 2025-04-01 RX ORDER — ONDANSETRON 8 MG/1
8 TABLET, ORALLY DISINTEGRATING ORAL EVERY 8 HOURS PRN
Status: DISCONTINUED | OUTPATIENT
Start: 2025-04-01 | End: 2025-04-01

## 2025-04-01 RX ORDER — CALCIUM CARBONATE 200(500)MG
500 TABLET,CHEWABLE ORAL 3 TIMES DAILY PRN
Status: DISCONTINUED | OUTPATIENT
Start: 2025-04-01 | End: 2025-04-01

## 2025-04-01 RX ORDER — SIMETHICONE 80 MG
1 TABLET,CHEWABLE ORAL EVERY 6 HOURS PRN
Status: DISCONTINUED | OUTPATIENT
Start: 2025-04-01 | End: 2025-04-03 | Stop reason: HOSPADM

## 2025-04-01 RX ORDER — TERBUTALINE SULFATE 1 MG/ML
INJECTION SUBCUTANEOUS
Status: COMPLETED
Start: 2025-04-01 | End: 2025-04-01

## 2025-04-01 RX ORDER — OXYTOCIN-SODIUM CHLORIDE 0.9% IV SOLN 30 UNIT/500ML 30-0.9/5 UT/ML-%
95 SOLUTION INTRAVENOUS ONCE AS NEEDED
Status: DISCONTINUED | OUTPATIENT
Start: 2025-04-01 | End: 2025-04-01

## 2025-04-01 RX ORDER — TRANEXAMIC ACID 10 MG/ML
1000 INJECTION, SOLUTION INTRAVENOUS EVERY 30 MIN PRN
Status: DISCONTINUED | OUTPATIENT
Start: 2025-04-01 | End: 2025-04-01

## 2025-04-01 RX ORDER — SODIUM CITRATE AND CITRIC ACID MONOHYDRATE 334; 500 MG/5ML; MG/5ML
30 SOLUTION ORAL ONCE
Status: DISCONTINUED | OUTPATIENT
Start: 2025-04-01 | End: 2025-04-01

## 2025-04-01 RX ORDER — DIPHENOXYLATE HYDROCHLORIDE AND ATROPINE SULFATE 2.5; .025 MG/1; MG/1
2 TABLET ORAL EVERY 6 HOURS PRN
Status: DISCONTINUED | OUTPATIENT
Start: 2025-04-01 | End: 2025-04-01

## 2025-04-01 RX ORDER — DOCUSATE SODIUM 100 MG/1
100 CAPSULE, LIQUID FILLED ORAL 2 TIMES DAILY
Qty: 60 CAPSULE | Refills: 1 | Status: SHIPPED | OUTPATIENT
Start: 2025-04-01

## 2025-04-01 RX ORDER — OXYTOCIN 10 [USP'U]/ML
10 INJECTION, SOLUTION INTRAMUSCULAR; INTRAVENOUS ONCE AS NEEDED
Status: DISCONTINUED | OUTPATIENT
Start: 2025-04-01 | End: 2025-04-03 | Stop reason: HOSPADM

## 2025-04-01 RX ORDER — SODIUM CHLORIDE, SODIUM LACTATE, POTASSIUM CHLORIDE, CALCIUM CHLORIDE 600; 310; 30; 20 MG/100ML; MG/100ML; MG/100ML; MG/100ML
INJECTION, SOLUTION INTRAVENOUS CONTINUOUS
Status: DISCONTINUED | OUTPATIENT
Start: 2025-04-01 | End: 2025-04-01

## 2025-04-01 RX ORDER — OXYTOCIN 10 [USP'U]/ML
10 INJECTION, SOLUTION INTRAMUSCULAR; INTRAVENOUS
Status: DISCONTINUED | OUTPATIENT
Start: 2025-04-01 | End: 2025-04-01

## 2025-04-01 RX ORDER — CEFAZOLIN 2 G/1
2 INJECTION, POWDER, FOR SOLUTION INTRAMUSCULAR; INTRAVENOUS ONCE AS NEEDED
Status: DISCONTINUED | OUTPATIENT
Start: 2025-04-01 | End: 2025-04-01

## 2025-04-01 RX ORDER — DEXTROMETHORPHAN HYDROBROMIDE, GUAIFENESIN 5; 100 MG/5ML; MG/5ML
650 LIQUID ORAL EVERY 6 HOURS
Qty: 60 TABLET | Refills: 1 | Status: SHIPPED | OUTPATIENT
Start: 2025-04-01

## 2025-04-01 RX ORDER — SODIUM CHLORIDE 0.9 % (FLUSH) 0.9 %
10 SYRINGE (ML) INJECTION EVERY 6 HOURS PRN
Status: DISCONTINUED | OUTPATIENT
Start: 2025-04-01 | End: 2025-04-03 | Stop reason: HOSPADM

## 2025-04-01 RX ORDER — TRANEXAMIC ACID 10 MG/ML
1000 INJECTION, SOLUTION INTRAVENOUS EVERY 30 MIN PRN
Status: DISCONTINUED | OUTPATIENT
Start: 2025-04-01 | End: 2025-04-03 | Stop reason: HOSPADM

## 2025-04-01 RX ORDER — DIPHENHYDRAMINE HCL 25 MG
25 CAPSULE ORAL EVERY 4 HOURS PRN
Status: DISCONTINUED | OUTPATIENT
Start: 2025-04-01 | End: 2025-04-03 | Stop reason: HOSPADM

## 2025-04-01 RX ORDER — NIFEDIPINE 30 MG/1
30 TABLET, EXTENDED RELEASE ORAL DAILY
Status: DISCONTINUED | OUTPATIENT
Start: 2025-04-01 | End: 2025-04-03 | Stop reason: HOSPADM

## 2025-04-01 RX ORDER — SODIUM CHLORIDE, SODIUM LACTATE, POTASSIUM CHLORIDE, CALCIUM CHLORIDE 600; 310; 30; 20 MG/100ML; MG/100ML; MG/100ML; MG/100ML
INJECTION, SOLUTION INTRAVENOUS CONTINUOUS
Status: DISCONTINUED | OUTPATIENT
Start: 2025-04-01 | End: 2025-04-02

## 2025-04-01 RX ADMIN — IBUPROFEN 600 MG: 600 TABLET, FILM COATED ORAL at 11:04

## 2025-04-01 RX ADMIN — MAGNESIUM SULFATE HEPTAHYDRATE 4 G: 40 INJECTION, SOLUTION INTRAVENOUS at 05:04

## 2025-04-01 RX ADMIN — LABETALOL HYDROCHLORIDE 40 MG: 5 INJECTION INTRAVENOUS at 04:04

## 2025-04-01 RX ADMIN — LABETALOL HYDROCHLORIDE 40 MG: 5 INJECTION INTRAVENOUS at 07:04

## 2025-04-01 RX ADMIN — ACETAMINOPHEN 1000 MG: 500 TABLET ORAL at 11:04

## 2025-04-01 RX ADMIN — LABETALOL HYDROCHLORIDE 20 MG: 5 INJECTION INTRAVENOUS at 05:04

## 2025-04-01 RX ADMIN — LABETALOL HYDROCHLORIDE 20 MG: 5 INJECTION INTRAVENOUS at 07:04

## 2025-04-01 RX ADMIN — ONDANSETRON 4 MG: 4 TABLET, ORALLY DISINTEGRATING ORAL at 05:04

## 2025-04-01 RX ADMIN — NIFEDIPINE 30 MG: 30 TABLET, FILM COATED, EXTENDED RELEASE ORAL at 04:04

## 2025-04-01 RX ADMIN — TERBUTALINE SULFATE 0.25 MG: 1 INJECTION SUBCUTANEOUS at 10:04

## 2025-04-01 RX ADMIN — IBUPROFEN 600 MG: 600 TABLET, FILM COATED ORAL at 04:04

## 2025-04-01 RX ADMIN — Medication 4 MILLI-UNITS/MIN: at 07:04

## 2025-04-01 RX ADMIN — MAGNESIUM SULFATE HEPTAHYDRATE 2 G/HR: 40 INJECTION, SOLUTION INTRAVENOUS at 11:04

## 2025-04-01 RX ADMIN — ACETAMINOPHEN 650 MG: 325 TABLET, FILM COATED ORAL at 08:04

## 2025-04-01 RX ADMIN — OXYTOCIN-SODIUM CHLORIDE 0.9% IV SOLN 30 UNIT/500ML 10 UNITS: 30-0.9/5 SOLUTION at 02:04

## 2025-04-01 RX ADMIN — SODIUM CHLORIDE: 9 INJECTION, SOLUTION INTRAVENOUS at 12:04

## 2025-04-01 RX ADMIN — PENICILLIN G POTASSIUM 3 MILLION UNITS: 20000000 INJECTION, POWDER, FOR SOLUTION INTRAVENOUS at 10:04

## 2025-04-01 RX ADMIN — CEFAZOLIN 1 G: 330 INJECTION, POWDER, FOR SOLUTION INTRAMUSCULAR; INTRAVENOUS at 04:04

## 2025-04-01 RX ADMIN — BUPIVACAINE HYDROCHLORIDE 6 ML: 2.5 INJECTION, SOLUTION EPIDURAL; INFILTRATION; INTRACAUDAL; PERINEURAL at 12:04

## 2025-04-01 RX ADMIN — PENICILLIN G POTASSIUM 3 MILLION UNITS: 20000000 INJECTION, POWDER, FOR SOLUTION INTRAVENOUS at 02:04

## 2025-04-01 RX ADMIN — DOCUSATE SODIUM 200 MG: 100 CAPSULE, LIQUID FILLED ORAL at 08:04

## 2025-04-01 RX ADMIN — PENICILLIN G POTASSIUM 5 MILLION UNITS: 5000000 INJECTION, POWDER, FOR SOLUTION INTRAMUSCULAR; INTRAVENOUS at 06:04

## 2025-04-01 RX ADMIN — MAGNESIUM SULFATE HEPTAHYDRATE 2 G/HR: 40 INJECTION, SOLUTION INTRAVENOUS at 06:04

## 2025-04-01 RX ADMIN — LABETALOL HYDROCHLORIDE 40 MG: 5 INJECTION INTRAVENOUS at 09:04

## 2025-04-01 RX ADMIN — FENTANYL CITRATE 8 ML/HR: 50 INJECTION INTRAMUSCULAR; INTRAVENOUS at 08:04

## 2025-04-01 RX ADMIN — SODIUM CHLORIDE, POTASSIUM CHLORIDE, SODIUM LACTATE AND CALCIUM CHLORIDE: 600; 310; 30; 20 INJECTION, SOLUTION INTRAVENOUS at 06:04

## 2025-04-01 NOTE — ED PROVIDER NOTES
Encounter Date: 2025       History     Chief Complaint   Patient presents with    Contractions     Julius Peguero is a 24 y.o.  at 36w4d who presents to the OB ED today (2025) with CC of contractions and vaginal spotting.    Pt reports to [facility] with concerns of contractions, states that these contractions first woke her from her sleep approx 0200. She originally went back to sleep, but then was awoken again around 0330. Pt went to the bathroom at this time, and noted some red vaginal spotting in addition to contractions q 5 minutes, prompting her to report to [facility] for further evaluation.     Pt endorses nausea. Denies vomiting, fevers, headaches, vision changes, scotoma, RUQ pain, or SOB.       She reports + vaginal bleeding, - leakage of fluid, and + contractions.   She reports good fetal movement.  This pregnancy is complicated by PRE-E W/O SF & FGR (5%tile).            Review of patient's allergies indicates:  No Known Allergies  No past medical history on file.  Past Surgical History:   Procedure Laterality Date    LAPAROSCOPIC SURGICAL REMOVAL OF CYST OF OVARY N/A 2022    Procedure: EXCISION, CYST, OVARY, LAPAROSCOPIC;  Surgeon: Alysia Lazo MD;  Location: [facility];  Service: OB/GYN;  Laterality: N/A;    MOUTH SURGERY       Family History   Problem Relation Name Age of Onset    No Known Problems Paternal Grandmother      No Known Problems Maternal Grandfather      No Known Problems Mother      No Known Problems Sister      Miscarriages / Stillbirths Neg Hx       labor Neg Hx      Stroke Neg Hx      Colon cancer Neg Hx       Social History[1]  Review of Systems   Constitutional:  Negative for chills and fever.   HENT:  Negative for congestion.    Eyes:  Negative for visual disturbance.   Respiratory:  Negative for chest tightness.    Cardiovascular:  Negative for chest pain, palpitations and leg swelling.   Gastrointestinal:  Negative for abdominal pain, constipation, diarrhea,  nausea and vomiting.   Genitourinary:  Positive for pelvic pain and vaginal bleeding. Negative for dysuria, vaginal discharge and vaginal pain.   Neurological:  Negative for headaches.   All other systems reviewed and are negative.      Physical Exam     Initial Vitals [04/01/25 0446]   BP Pulse Resp Temp SpO2   (!) 180/119 96 19 97.5 °F (36.4 °C) 99 %      MAP       --         Physical Exam    Vitals reviewed.  Constitutional: She appears well-developed and well-nourished. No distress.   HENT:   Head: Normocephalic and atraumatic.   Nose: Nose normal.   Eyes: Conjunctivae and EOM are normal.   Neck:   Normal range of motion.  Cardiovascular:  Normal rate, regular rhythm and intact distal pulses.           Pulmonary/Chest: Breath sounds normal. No respiratory distress.   Abdominal: Abdomen is soft. She exhibits no distension. There is no abdominal tenderness.   Musculoskeletal:         General: Normal range of motion.      Cervical back: Normal range of motion.     Neurological: She is alert and oriented to person, place, and time.   Skin: Skin is warm and dry.   Psychiatric: She has a normal mood and affect. Thought content normal.     OB LABOR EXAM:   Pre-Term Labor: No.   Membranes ruptured: No.   Method: Sterile vaginal exam per MD and Sterile speculum exam per MD.   Vaginal Bleeding: other (see comments).   Engagement: engaged.   Dilatation: 2.   Station: -3.   Effacement: 40%.   Amniotic Fluid Color: no fluid.     Comments: SSE: scant blood mixed with vaginal discharge, no active bleeding from cervical os   SVE: 2/40/-3   Nitrazine negative        ED Course   Obtain Fetal nonstress test (NST)    Date/Time: 4/1/2025 5:05 AM    Performed by: Ofelia Denson MD  Authorized by: Ofelia Denson MD    Nonstress Test:     Variability:  6-25 BPM    Decelerations:  None    Accelerations:  15 bpm    Baseline:  145    Contractions:  Regular    Contraction Frequency:  5  Biophysical Profile:     Overall Impression:   Reassuring    Labs Reviewed   CBC W/ AUTO DIFFERENTIAL    Narrative:     The following orders were created for panel order CBC auto differential.  Procedure                               Abnormality         Status                     ---------                               -----------         ------                     CBC with Differential[[id number]]                           In process                   Please view results for these tests on the individual orders.   COMPREHENSIVE METABOLIC PANEL   PROTEIN / CREATININE RATIO, URINE   CBC WITH DIFFERENTIAL   TREPONEMA PALLIDIUM ANTIBODIES IGG, IGM   TYPE AND SCREEN LABOR & DELIVERY          Imaging Results    None          Medications   magnesium sulfate in water 40 gram/1,000 mL (4 %) bolus from bag 4 g 100 mL (has no administration in time range)     And   lactated ringers infusion (has no administration in time range)   magnesium sulfate in water 40 gram/1,000 mL (4 %) infusion (has no administration in time range)   calcium gluconate 100 mg/mL (10%) injection 1 g (has no administration in time range)   lactated ringers bolus 1,000 mL (has no administration in time range)   lactated ringers bolus 500 mL (has no administration in time range)   lactated ringers infusion (has no administration in time range)   0.9% NaCl infusion (has no administration in time range)   oxytocin injection 10 Units (has no administration in time range)   ceFAZolin 2 g (has no administration in time range)   azithromycin (ZITHROMAX) 500 mg in 0.9% NaCl 250 mL IVPB (admixture device) (has no administration in time range)   ondansetron disintegrating tablet 8 mg (has no administration in time range)   acetaminophen tablet 650 mg (has no administration in time range)   calcium carbonate 200 mg calcium (500 mg) chewable tablet 500 mg (has no administration in time range)   simethicone chewable tablet 80 mg (has no administration in time range)   penicillin G potassium 5 Million Units in  D5W 100 mL IVPB (MB+) (has no administration in time range)   penicillin G potassium 3 Million Units in dextrose 5 % 100 mL IVPB (has no administration in time range)   labetaloL injection 20 mg (20 mg Intravenous Given 25 4376)   ondansetron disintegrating tablet 4 mg (4 mg Oral Given 25 1683)     Medical Decision Making  Julius Peguero is a 24 y.o.  at 36w4d who presents to the OB ED today (2025) with CC of contractions and vaginal spotting.    Temp:  [97.5 °F (36.4 °C)] 97.5 °F (36.4 °C)  Pulse:  [96-99] 96  Resp:  [19] 19  SpO2:  [99 %] 99 %  BP: (178-180)/(119-121) 178/121    NST reassuring as above   [place] q5 minutes     Contractions/Vaginal bleeding   - pt reports to [facility] with complaints of regular contractions waking her from sleep at -0200 and vaginal spotting   - VS notable for BP as above, otherwise stable   - PE WNL   - NST reassuring as above   - [place] q 5 minutes   - SVE 2/50/-3, scant blood/mucoid discharge with no active bleeding from cervical os   - Nitrazine test negative   - Confirmed vertex on US     PreE w/ SF (BP)   - pt with severe range BP x2 in [facility]   - s/p labetalol 20mg IV   - preE labs drawn   - Pt counseled regarding diagnosis of PreE necessitating magnesium sulfate intervention and IOL. Pt in agreement with plan   -Please see full H&P for further detail       Fara Denson MD (Mary)   Obstetrics and Gynecology, PGY1        Amount and/or Complexity of Data Reviewed  Labs: ordered.    Risk  Prescription drug management.              Attending Attestation:   Physician Attestation Statement for Resident:  As the supervising MD   Physician Attestation Statement: I have personally seen and examined this patient.   I agree with the above history.  -:   As the supervising MD I agree with the above PE.     As the supervising MD I agree with the above treatment, course, plan, and disposition.   I was personally present during the critical portions of the procedure(s) performed  by the resident and was immediately available in the ED to provide services and assistance as needed during the entire procedure.   I have reviewed the following: old records at this facility.            Attending ED Notes:   I have personally seen and examined the patient. I agree with the resident's note . Questions welcomed and answered to patient satisfaction.      @ 36w4d  presenting for vaginal bleeding, diagnosed with preeclampsia with severe features  NST: reactive and reassuring   Admit for induction of labor due to Preeclampsia with SF  - Labtalol 20mg IV given with good response. Transfer to L&D with plan for magnesium sulfate.   - Counseled regarding recommendation for 36 week IOL and magnesium.       Jessica Delgado MD  2025 9:47 AM          ED Course as of 25 0542   e 2025   0524 BP(!): 180/119 [RF]   0524 BP(!): 178/121 [RF]      ED Course User Index  [RF] Jessica Granados MD                           Clinical Impression:  Final diagnoses:  [O47.9] Uterine contractions during pregnancy  [O14.93] Pre-eclampsia in third trimester  [Z3A.36] 36 weeks gestation of pregnancy  [N93.9] Vaginal bleeding          ED Disposition Condition    Send to L&D                   Ofelia Denson MD  Resident  25 0543         [1]   Social History  Tobacco Use    Smoking status: Never    Smokeless tobacco: Never   Substance Use Topics    Alcohol use: No    Drug use: Yes     Types: Marijuana        Jessica Granados MD  25 0970

## 2025-04-01 NOTE — PROGRESS NOTES
"LABOR NOTE    S:  Complaints: No.  Epidural Working:  Yes  Resident to bedside for recurrent late decelerations.     O: /89   Pulse 78   Temp 97.7 °F (36.5 °C) (Oral)   Resp 16   Ht 5' 4" (1.626 m)   Wt 82 kg (180 lb 12.4 oz)   SpO2 99%   Breastfeeding No   BMI 31.03 kg/m²     Temp:  [97.5 °F (36.4 °C)-97.7 °F (36.5 °C)] 97.7 °F (36.5 °C)  Pulse:  [74-99] 78  Resp:  [16-19] 16  SpO2:  [96 %-100 %] 99 %  BP: (126-181)/() 137/89    FHT: 115 BPM, moderate variability, +accels, + recurrent late decel Cat 2 (reassuring)  CTX: q2-3min  SVE: 80/-2    TIMELINE:   0430: 40/-3   0630: 50/-2, forrest balloon placed   0930: 70/-2, s/p forrest   1015: 80/-2, AROM, mec  1045: 80/-2, IUPC placed. Prolonged deceleration for 5 minutes, patient repositioned, terbutaline given, pitocin paused, and fluid bolus given with improvement in fetal heart tracing.   1330: 680/-1, Fetal decelerations improved with maternal repositioning.     PLAN:      Continue Close Maternal/Fetal Monitoring  Plan to keep pitocin paused.  Discussed with patient that if she continues to have recurrent decelerations despite maternal repositioning, with pitocin paused, and s/p amnioinfusion, we may need to proceed with pLTCS. Patient would like to continue with labor process at this time time but is understanding and okay with proceeding with  delivery if indicated.       Continue penicillin for GBS prophylaxis.   Continue magnesium for seizure prophylaxis  Recheck 2-4 hours or PRN      Gisel Huerta MD  Obstetrics & Gynecology, PGY-1    "

## 2025-04-01 NOTE — PROGRESS NOTES
LABOR NOTE    S:  Complaints: No.  Epidural Working:  Yes  Resident to bedside for routine cervical check     O: /83 Comment: MD notified  Pulse 75   Temp 97.5 °F (36.4 °C) (Oral)   Resp 19   SpO2 96%   Breastfeeding No     FHT: 120 BPM, moderate variability, +accels, + recurrent late decels decels Cat 2 (reassuring)  CTX: q 5 minutes  SVE: 4/70/-2, pit @ 4     TIMELINE:   0430: 2/40/-3   0630: 2/50/-2, forrest balloon placed   0930: 4/70/-2, s/p forrest     PLAN:      Continue Close Maternal/Fetal Monitoring  Pitocin Augmentation per protocol  Continue penicillin for GBS prophylaxis. Plan for AROM after next dose of PCN.   Recheck 2-4 hours or PRN      Gisel Huerta MD  Obstetrics & Gynecology, PGY-1

## 2025-04-01 NOTE — PROGRESS NOTES
"LABOR NOTE    S:  Complaints: No.  Epidural Working:  Yes  Resident to bedside for routine cervical check     O: /83   Pulse 82   Temp 97.6 °F (36.4 °C) (Oral)   Resp 16   Ht 5' 4" (1.626 m)   Wt 82 kg (180 lb 12.4 oz)   SpO2 99%   Breastfeeding No   BMI 31.03 kg/m²     Temp:  [97.5 °F (36.4 °C)-97.6 °F (36.4 °C)] 97.6 °F (36.4 °C)  Pulse:  [74-99] 82  Resp:  [16-19] 16  SpO2:  [96 %-100 %] 99 %  BP: (127-181)/() 127/83    FHT: 120 BPM, moderate variability, +accels, + recurrent late and variable decels Cat 2 (reassuring)  CTX: q 3 minutes  SVE: 4/80/-2, AROM, mec, pit at 4    TIMELINE:   0430: 2/40/-3   0630: 2/50/-2, forrest balloon placed   0930: 4/70/-2, s/p forrest   1015: 4/80/-2, AROM, mec    PLAN:      Continue Close Maternal/Fetal Monitoring  Pitocin Augmentation per protocol  Continue penicillin for GBS prophylaxis.   Continue magnesium for seizure prophylaxis  --Denies any Pre-E s/s at this time  Recheck 2-4 hours or PRN          Selena Fuller MD, MPH  OBGYN PGY-4      "

## 2025-04-01 NOTE — PROGRESS NOTES
"LABOR NOTE    S:  Complaints: No.  Epidural Working:  Yes  Resident to bedside 2/2 maternal pressure    O: /89   Pulse 78   Temp 97.7 °F (36.5 °C) (Oral)   Resp 16   Ht 5' 4" (1.626 m)   Wt 82 kg (180 lb 12.4 oz)   SpO2 99%   Breastfeeding No   BMI 31.03 kg/m²     Temp:  [97.5 °F (36.4 °C)-97.7 °F (36.5 °C)] 97.7 °F (36.5 °C)  Pulse:  [74-99] 78  Resp:  [16-19] 16  SpO2:  [96 %-100 %] 99 %  BP: (126-181)/() 137/89    FHT: 125 BPM, moderate variability, +accels, + recurrent late decel Cat 2 (reassuring)  CTX: q2-3min  SVE: 10/100/+2    TIMELINE:   0430: 240/-3   0630: 2/50/-2, forrest balloon placed   0930: 4/70/-2, s/p forrest   1015: 4/80/-2, AROM, mec  1045: 5/80/-2, IUPC placed. Prolonged deceleration for 5 minutes, patient repositioned, terbutaline given, pitocin paused, and fluid bolus given with improvement in fetal heart tracing.   1330: 6/80/-1, Fetal decelerations improved with maternal repositioning  1400: 10/100/+2     PLAN:  Anticipate   Continue Close Maternal/Fetal Monitoring    Continue penicillin for GBS prophylaxis.   Continue magnesium for seizure prophylaxis  Recheck 2-4 hours or PRN    Korina Mayer MD  OB/GYN PGY-3   "

## 2025-04-01 NOTE — NURSING
1543: /87. MD notified. To retake BP in 15 minutes.   1558: /106. MD notified. To give antihypertensives.   1609: 40 mg Labetalol given.   1630: /96. MD notified.

## 2025-04-01 NOTE — ANESTHESIA PREPROCEDURE EVALUATION
[facility]  Anesthesia Pre-Operative Evaluation         Patient Name: Julius Peguero  YOB: 2000  MRN: [id number]    2025      Julius Peguero is a 24 y.o. female  @ 36w4d who presents to [facility] with contractions and complaints of vaginal spotting. Pregnancy c/b cHTN with SI PreE w/SF (BP).     Denies previous issues with neuraxial anesthesia.    Denies previous issues with general anesthesia.    Denies family history of issues with anesthesia.     Denies hx of seizures, strokes, heart failure, smoking, asthma, COPD, acid reflux, liver disease, kidney disease, bleeding disorders, taking blood thinners, back surgery      OB History    Para Term  AB Living   1 0 0 0 0 0   SAB IAB Ectopic Multiple Live Births   0 0 0 0 0      # Outcome Date GA Lbr Mark/2nd Weight Sex Type Anes PTL Lv   1 Current                Review of patient's allergies indicates:  No Known Allergies    Wt Readings from Last 1 Encounters:   25 1044 82 kg (180 lb 12.4 oz)       BP Readings from Last 3 Encounters:   25 (!) 160/106   25 (!) 150/97   25 124/88       Problem List[1]    Past Surgical History:   Procedure Laterality Date    LAPAROSCOPIC SURGICAL REMOVAL OF CYST OF OVARY N/A 2022    Procedure: EXCISION, CYST, OVARY, LAPAROSCOPIC;  Surgeon: Alysia Lazo MD;  Location: [facility];  Service: OB/GYN;  Laterality: N/A;    MOUTH SURGERY         Social History[2]      Chemistry        Component Value Date/Time     2025 0515     2025 1223    K 4.4 2025 0515    K 4.0 2025 1223     2025 0515     2025 1223    CO2 18 (L) 2025 0515    CO2 21 (L) 2025 1223    BUN 9 2025 0515    CREATININE 0.6 2025 0515    GLU 79 2025 1223        Component Value Date/Time    CALCIUM 8.9 2025 0515    CALCIUM 9.0 2025 1223    ALKPHOS 223 (H) 2025 0515    ALKPHOS 195 (H) 2025  "1223    AST 27 04/01/2025 0515    AST 15 03/20/2025 1223    ALT 17 04/01/2025 0515    ALT 13 03/20/2025 1223    BILITOT 0.2 04/01/2025 0515    BILITOT 0.2 03/20/2025 1223    ESTGFRAFRICA >60 05/09/2022 2157    EGFRNONAA >60 05/09/2022 2157            Lab Results   Component Value Date    WBC 10.45 04/01/2025    HGB 10.9 (L) 04/01/2025    HCT 32.4 (L) 04/01/2025    MCV 91 04/01/2025     04/01/2025       No results for input(s): "PT", "INR", "PROTIME", "APTT" in the last 72 hours.                                                                                                                   04/01/2025  Juilus Peguero is a 24 y.o., female.      Pre-op Assessment    I have reviewed the Patient Summary Reports.     I have reviewed the Nursing Notes. I have reviewed the NPO Status.   I have reviewed the Medications.     Review of Systems  Anesthesia Hx:  No problems with previous Anesthesia   History of prior surgery of interest to airway management or planning:          Denies Family Hx of Anesthesia complications.    Denies Personal Hx of Anesthesia complications.                    Cardiovascular:     Hypertension   Denies MI.  Denies CAD.                                          Pulmonary:    Denies COPD.                     Hepatic/GI:      Denies GERD.                Endocrine:        Denies Obesity / BMI > 30      Physical Exam  General: Well nourished, Cooperative, Alert and Oriented    Airway:  Mallampati: II   Mouth Opening: Normal  TM Distance: Normal  Tongue: Normal  Neck ROM: Normal ROM    Dental:  Intact    Chest/Lungs:  Clear to auscultation, Normal Respiratory Rate    Heart:  Rate: Normal  Rhythm: Regular Rhythm        Anesthesia Plan  Type of Anesthesia, risks & benefits discussed:    Anesthesia Type: Epidural, CSE  Intra-op Monitoring Plan: Standard ASA Monitors  Post Op Pain Control Plan: multimodal analgesia and IV/PO Opioids PRN  Informed Consent: Informed consent signed with the Patient and " all parties understand the risks and agree with anesthesia plan.  All questions answered.   ASA Score: 3  Day of Surgery Review of History & Physical: H&P Update referred to the surgeon/provider.    Ready For Surgery From Anesthesia Perspective.     .           [1]   Patient Active Problem List  Diagnosis    Left ovarian cyst    Severe pre-eclampsia in third trimester    Fetal growth restriction antepartum   [2]   Social History  Socioeconomic History    Marital status: Single   Tobacco Use    Smoking status: Never    Smokeless tobacco: Never   Substance and Sexual Activity    Alcohol use: No    Drug use: Not Currently     Types: Marijuana    Sexual activity: Yes     Partners: Male     Birth control/protection: None     Social Drivers of Health     Financial Resource Strain: Low Risk  (3/27/2025)    Overall Financial Resource Strain (CARDIA)     Difficulty of Paying Living Expenses: Not hard at all   Food Insecurity: No Food Insecurity (3/27/2025)    Hunger Vital Sign     Worried About Running Out of Food in the Last Year: Never true     Ran Out of Food in the Last Year: Never true   Transportation Needs: No Transportation Needs (3/27/2025)    PRAPARE - Transportation     Lack of Transportation (Medical): No     Lack of Transportation (Non-Medical): No   Stress: No Stress Concern Present (3/27/2025)    [employer] - Occupational Stress Questionnaire     Feeling of Stress : Not at all   Housing Stability: Low Risk  (3/27/2025)    Housing Stability Vital Sign     Unable to Pay for Housing in the Last Year: No     Homeless in the Last Year: No

## 2025-04-01 NOTE — PLAN OF CARE
Problem:  Fall Injury Risk  Goal: Absence of Fall, Infant Drop and Related Injury  Outcome: Progressing     Problem: Adult Inpatient Plan of Care  Goal: Plan of Care Review  Outcome: Progressing  Goal: Patient-Specific Goal (Individualized)  Outcome: Progressing  Goal: Absence of Hospital-Acquired Illness or Injury  Outcome: Progressing  Goal: Optimal Comfort and Wellbeing  Outcome: Progressing  Goal: Readiness for Transition of Care  Outcome: Progressing     Problem: Infection  Goal: Absence of Infection Signs and Symptoms  Outcome: Progressing     Problem: Anesthesia/Analgesia, Neuraxial  Goal: Safe, Effective Infusion Delivery  Outcome: Progressing  Goal: Stable Patient-Fetal Status  Outcome: Progressing  Goal: Absence of Infection Signs and Symptoms  Outcome: Progressing  Goal: Nausea and Vomiting Relief  Outcome: Progressing  Goal: Effective Pain Control  Outcome: Progressing  Goal: Effective Oxygenation and Ventilation  Outcome: Progressing  Goal: Baseline Motor Function Return  Outcome: Progressing  Goal: Effective Urinary Elimination  Outcome: Progressing     Problem: Hypertensive Disorders in Pregnancy  Goal: Patient-Fetal Stabilization  Outcome: Progressing     Problem: Postpartum (Vaginal Delivery)  Goal: Successful Parent Role Transition  Outcome: Progressing  Goal: Hemostasis  Outcome: Progressing  Goal: Absence of Infection Signs and Symptoms  Outcome: Progressing  Goal: Anesthesia/Sedation Recovery  Outcome: Progressing  Goal: Optimal Pain Control and Function  Outcome: Progressing  Goal: Effective Urinary Elimination  Outcome: Progressing     Problem: Breastfeeding  Goal: Effective Breastfeeding  Outcome: Progressing

## 2025-04-01 NOTE — NURSING
0849: /97. MD notified. To get repeat BP.   0905: /101. MD notified.   0911: Labetalol 40 mg given   0935: /83. MD notified.

## 2025-04-01 NOTE — PROGRESS NOTES
"LABOR NOTE    S:  Complaints: No.  Epidural Working:  Yes  Resident to bedside to place IUPC.    O: /81   Pulse 81   Temp 97.7 °F (36.5 °C) (Oral)   Resp 16   Ht 5' 4" (1.626 m)   Wt 82 kg (180 lb 12.4 oz)   SpO2 97%   Breastfeeding No   BMI 31.03 kg/m²     Temp:  [97.5 °F (36.4 °C)-97.7 °F (36.5 °C)] 97.7 °F (36.5 °C)  Pulse:  [74-99] 81  Resp:  [16-19] 16  SpO2:  [96 %-100 %] 97 %  BP: (126-181)/() 132/81    FHT: moderate variability, -accels, + variable and late decels  Cat 2 (reassuring)  CTX: q 1-2 min  SVE: 5/80/-2, amnioinfusion ordered, pitocin off    TIMELINE:   0430: 2/40/-3   0630: 2/50/-2, forrest balloon placed   0930: 4/70/-2, s/p forrest   1015: 4/80/-2, [facility], mec  1045: 5/80/-2, IUPC placed. Prolonged deceleration for 5 minutes, patient repositioned, terbutaline given, pitocin paused, and fluid bolus given with improvement in fetal heart tracing.   1230: 5/80/-2, amnioinfusion ordered, pitocin off      PLAN:      Continue Close Maternal/Fetal Monitoring  Plan to keep pitocin off  Continue penicillin for GBS prophylaxis.   Continue magnesium for seizure prophylaxis  Recheck 2-4 hours or PRN    Selena Fuller MD, MPH  OBGYN PGY-4      "

## 2025-04-01 NOTE — INTERVAL H&P NOTE
The patient has been examined and the H&P has been reviewed:    Julius Peguero is 24 y.o.  at 36w4d wga presenting for contractions, subsiquently found to have cHTN w/ S/I preE w/ SF (BP), prompting IOL.     Temp:  [97.5 °F (36.4 °C)] 97.5 °F (36.4 °C)  Pulse:  [96-99] 96  Resp:  [19] 19  SpO2:  [99 %] 99 %  BP: (178-180)/(119-121) 178/121    FHT: 135 bpm, moderate BTBV, +accels, -decels; Cat 1 (reassuring)  [place]: q irregular (q 5-10)   Presentation: cephalic by ultrasound    SVE: /-3    PreE w/ SF (BP)  -PreE labs pending   -s/p labetalol 20mg IV in [facility]   -Magnesium sulfate initiated   -Q 4 hr neuro checks     Post-Partum Hemorrhage risk - medium     FGR  - Normal UA dopplers  - PNT has been reassuring  - Management per pediatrics after delivery  - EFW 1656g, 5% @ 33wga     GBS unknown   -GBS pending   -Penicillin per protocol in interim     Contraception: Florencia Denson MD (Mary)   Obstetrics and Gynecology, PGY1

## 2025-04-01 NOTE — L&D DELIVERY NOTE
[facility] - Labor & Delivery  Operative Vaginal Delivery   Operative Note    SUMMARY     After approximately 10 minutes of maternal pushing, decision made to proceed with operative delivery. Verbal consent was obtained. Patient was counseled on r/b/a. Charge RN, NICU, and anesthesia made aware. OB staff present. Decision made to attempt operative delivery labor room.    EFW 5ls. Patient's pelvis though to be adequate and appropriate for operative delivery.     Indication for operative delivery: non reassuring fetal status.    Fetal position was confirmed is OA, vacuum applied to fetal head. Position and correct application again reconfirmed. 3 number of pulls, 2 pop offs, with 5 of contractions were performed with fetal head descent. Once fetal head at perineum,  vacuum removed. Infant delivered OA over intact perineum.    No nuchal cord  Infant passed to NICU after delivery for resuscitation  Cord clamped and cut.  Umbilical arterial gas and venous blood obtained.  Two posterior vaginal abrasion/1st degree lacerations found to be hemostatic after repair  Placenta delivered spontaneously with manual extraction of trailing membranes, and IV pitocin and IV cefazolin given.  Uterine tone noted. No cervical lacerations.  Patient tolerated delivery well.  EBL 250cc  Staff present for entire procedure.  S/L/N counts correct x2.    Selena Fuller MD, MPH  OBGYN PGY-4      Indications:  (spontaneous vaginal delivery)  Pregnancy complicated by: Problem List[1]  Admitting GA: 36w4d    Delivery Information for Aayush Peguero    Birth information:  YOB: 2024   Time of birth: 2:48 PM   Sex: male   Head Delivery Date/Time: 2025  2:48 PM   Delivery type: Vaginal, Vacuum (Extractor)   Gestational Age: 36w4d       Delivery Providers    Delivering clinician: Blanche Castellon MD   Provider Role    Selena Fuller MD [place]au, Madeleine, Chante Beasley RN               Measurements    Weight: 2250  g  Weight (lbs): 4 lb 15.4 oz  Length:          Apgars    Living status:   Apgar Component Scores:  1 min.:  5 min.:  10 min.:  15 min.:  20 min.:    Skin color:         Heart rate:         Reflex irritability:         Muscle tone:         Respiratory effort:         Total:                  Operative Delivery    Forceps attempted?: No  Vacuum extractor attempted?: Yes  Vacuum type: Kiwi Pro (bell)  Vacuum application location: Mid         Shoulder Dystocia    Shoulder dystocia present?: No           Presentation    Presentation: Vertex  Position: Occiput Anterior           Interventions/Resuscitation    Method: NICU Attended       Cord    Vessels: 3 vessels  Delayed Cord Clamping?: No  Cord Clamped Date/Time: 2025  2:48 PM  Cord Blood Disposition: Discarded  Gases Sent?: Yes  Stem Cell Collection (by MD): No       Placenta    Placenta delivery date/time: 2025 14:51  Placenta removal: Manual removal  Placenta disposition: Pathology           Labor Events:       labor:       Labor Onset Date/Time:         Dilation Complete Date/Time:         Start Pushing Date/Time:         Start Pushing Date/Time:       Rupture Date/Time: 25 1019        Rupture type: ARM (Artificial Rupture)        Fluid Amount:       Fluid Color: Green              steroids: None     Antibiotics given for GBS: Yes     Induction: balloon dilation ([place])     Indications for induction:  Severe Preeclampsia     Augmentation: oxytocin     Indications for augmentation: Ineffective Contraction Pattern     Labor complications: Fetal Intolerance     Additional complications:          Cervical ripening:                     Delivery:      Episiotomy: None     Indication for Episiotomy:       Perineal Lacerations: 1st Repaired:  Yes   Periurethral Laceration:   Repaired:     Labial Laceration:   Repaired:     Sulcus Laceration:   Repaired:     Vaginal Laceration:   Repaired:     Cervical Laceration:   Repaired:     Repair suture:        Repair # of packets:       Last Value - EBL - Nursing (mL):       Sum - EBL - Nursing (mL): 0     Last Value - EBL - Anesthesia (mL):      Calculated QBL (mL):       Running total QBL (mL):       Vaginal Sweep Performed:       Surgicount Correct:       Vaginal Packing:   Quantity:       Other providers:       Anesthesia    Method: Epidural          Details (if applicable):  Trial of Labor      Categorization:      Priority:     Indications for :     Incision Type:       Additional  information:  Forceps:    Vacuum:    Breech:    Observed anomalies    Other (Comments):         Selena Fuller MD, MPH  OBGYN PGY-4             [1]   Patient Active Problem List  Diagnosis    Left ovarian cyst    Severe pre-eclampsia in third trimester     (spontaneous vaginal delivery)

## 2025-04-01 NOTE — ANESTHESIA PROCEDURE NOTES
Epidural    Patient location during procedure: OB   Reason for block: primary anesthetic   Reason for block: labor analgesia requested by patient and obstetrician  Diagnosis: IUP   Start time: 4/1/2025 8:37 AM  Timeout: 4/1/2025 8:35 AM  End time: 4/1/2025 8:42 AM  Surgery related to: Vaginal Delivery    Staffing  Performing Provider: Martha Fraser MD  Authorizing Provider: Karyn Pacheco MD    Staffing  Performed by: Martha Fraser MD  Authorized by: Karyn Pacheco MD        Preanesthetic Checklist  Completed: patient identified, IV checked, site marked, risks and benefits discussed, surgical consent, monitors and equipment checked, pre-op evaluation, timeout performed, anesthesia consent given, hand hygiene performed and patient being monitored  Preparation  Patient position: sitting  Prep: ChloraPrep  Patient monitoring: Pulse Ox  Reason for block: primary anesthetic   Epidural  Skin Anesthetic: lidocaine 1%  Skin Wheal: 3 mL  Administration type: continuous  Approach: midline  Interspace: L3-4    Injection technique: JOHN saline  Needle and Epidural Catheter  Needle type: Tuohy   Needle gauge: 17  Needle length: 3.5 inches  Needle insertion depth: 6 cm  Catheter type: [email or web address]  Catheter size: 19 G  Catheter at skin depth: 10 cm  Insertion Attempts: 1  Test dose: 3 mL of lidocaine 1.5% with Epi 1-to-200,000  Additional Documentation: incremental injection, negative aspiration for heme and CSF, no paresthesia on injection, no signs/symptoms of IV or SA injection, no significant pain on injection and no significant complaints from patient  Needle localization: anatomical landmarks  Medications:  Volume per aspiration: 5 mL  Time between aspirations: 5 minutes   Assessment  Ease of block: easy  Patient's tolerance of the procedure: comfortable throughout block and no complaints No inadvertent dural puncture with Tuohy.  Dural puncture performed with spinal needle.

## 2025-04-01 NOTE — PROGRESS NOTES
LABOR NOTE    S:  Complaints: No.  Epidural Working:  not applicable  Resident to bedside for cervical forrest placement     O: BP (!) 146/81 Comment: MD Jarett aware. No further orders at this time.  Pulse 86   Temp 97.5 °F (36.4 °C) (Oral)   Resp 19   SpO2 99%   Breastfeeding No     FHT: 145 BPM, moderate BTBV, +accels, - decels Cat 1 (reassuring)  CTX: q 5 minutes  SVE: 2/50/-2    TIMELINE:   0430: 2/40/-3   0630: 2/50/-2, forrest balloon placed     PLAN:      Continue Close Maternal/Fetal Monitoring  Pitocin Augmentation per protocol  Continue penicillin for GBS prophylaxis   Recheck 2-4 hours or PRN      Fara (Ofelia) MD Jarett   Obstetrics and Gynecology, PGY1       1 Principal Discharge DX:	Arthralgia  Secondary Diagnosis:	Pleurisy

## 2025-04-01 NOTE — PROGRESS NOTES
"LABOR NOTE    S:  Complaints: No.  Epidural Working:  Yes  Resident to bedside to place IUPC.    O: /83   Pulse 82   Temp 97.6 °F (36.4 °C) (Oral)   Resp 16   Ht 5' 4" (1.626 m)   Wt 82 kg (180 lb 12.4 oz)   SpO2 99%   Breastfeeding No   BMI 31.03 kg/m²     Temp:  [97.5 °F (36.4 °C)-97.6 °F (36.4 °C)] 97.6 °F (36.4 °C)  Pulse:  [74-99] 82  Resp:  [16-19] 16  SpO2:  [96 %-100 %] 99 %  BP: (127-181)/() 127/83    FHT: 120 BPM, moderate variability, +accels, + prolonged late decel Cat 2 (reassuring)  CTX: q 1 min  SVE: 4/80/-2, AROM, mec, pit at 4    TIMELINE:   0430: 2/40/-3   0630: 2/50/-2, forrest balloon placed   0930: 4/70/-2, s/p forrest   1015: 4/80/-2, AROM, mec  1045: 5/80/-2, IUPC placed. Prolonged deceleration for 5 minutes, patient repositioned, terbutaline given, pitocin paused, and fluid bolus given with improvement in fetal heart tracing.     PLAN:      Continue Close Maternal/Fetal Monitoring  Plan to keep pitocin paused for at least 30 minutes.   Continue penicillin for GBS prophylaxis.   Continue magnesium for seizure prophylaxis  Recheck 2-4 hours or PRN      Gisel Huerta MD  Obstetrics & Gynecology, PGY-1    "

## 2025-04-01 NOTE — NURSING
0637: /106. Night shift RN notified MD.   0704: /117 (repeat BP). MD Notified.   0710: Labetalol 20 mg given  0735: / 108 (repeat after Labetalol). MD notified.   0742: Labetalol 40 mg given.   0804: /101 (repeat after Labetalol 40 mg). MD notified.

## 2025-04-02 LAB
ABSOLUTE EOSINOPHIL (OHS): 0.02 K/UL
ABSOLUTE MONOCYTE (OHS): 1.07 K/UL (ref 0.3–1)
ABSOLUTE NEUTROPHIL COUNT (OHS): 17.92 K/UL (ref 1.8–7.7)
BASOPHILS # BLD AUTO: 0.07 K/UL
BASOPHILS NFR BLD AUTO: 0.3 %
ERYTHROCYTE [DISTWIDTH] IN BLOOD BY AUTOMATED COUNT: 15.1 % (ref 11.5–14.5)
HCT VFR BLD AUTO: 27.2 % (ref 37–48.5)
HGB BLD-MCNC: 8.8 GM/DL (ref 12–16)
IMM GRANULOCYTES # BLD AUTO: 0.16 K/UL (ref 0–0.04)
IMM GRANULOCYTES NFR BLD AUTO: 0.8 % (ref 0–0.5)
LYMPHOCYTES # BLD AUTO: 2.01 K/UL (ref 1–4.8)
MCH RBC QN AUTO: 29.7 PG (ref 27–31)
MCHC RBC AUTO-ENTMCNC: 32.4 G/DL (ref 32–36)
MCV RBC AUTO: 92 FL (ref 82–98)
NUCLEATED RBC (/100WBC) (OHS): 0 /100 WBC
PLATELET # BLD AUTO: 242 K/UL (ref 150–450)
PMV BLD AUTO: 11.2 FL (ref 9.2–12.9)
RBC # BLD AUTO: 2.96 M/UL (ref 4–5.4)
RELATIVE EOSINOPHIL (OHS): 0.1 %
RELATIVE LYMPHOCYTE (OHS): 9.5 % (ref 18–48)
RELATIVE MONOCYTE (OHS): 5 % (ref 4–15)
RELATIVE NEUTROPHIL (OHS): 84.3 % (ref 38–73)
WBC # BLD AUTO: 21.25 K/UL (ref 3.9–12.7)

## 2025-04-02 PROCEDURE — 99232 SBSQ HOSP IP/OBS MODERATE 35: CPT | Mod: ,,, | Performed by: OBSTETRICS & GYNECOLOGY

## 2025-04-02 PROCEDURE — 72100003 HC LABOR CARE, EA. ADDL. 8 HRS

## 2025-04-02 PROCEDURE — 25000003 PHARM REV CODE 250

## 2025-04-02 PROCEDURE — 72100002 HC LABOR CARE, 1ST 8 HOURS

## 2025-04-02 PROCEDURE — 36415 COLL VENOUS BLD VENIPUNCTURE: CPT

## 2025-04-02 PROCEDURE — 11000001 HC ACUTE MED/SURG PRIVATE ROOM

## 2025-04-02 PROCEDURE — 85025 COMPLETE CBC W/AUTO DIFF WBC: CPT

## 2025-04-02 RX ADMIN — ACETAMINOPHEN 650 MG: 325 TABLET, FILM COATED ORAL at 06:04

## 2025-04-02 RX ADMIN — IBUPROFEN 600 MG: 600 TABLET, FILM COATED ORAL at 06:04

## 2025-04-02 RX ADMIN — DOCUSATE SODIUM 200 MG: 100 CAPSULE, LIQUID FILLED ORAL at 08:04

## 2025-04-02 RX ADMIN — IBUPROFEN 600 MG: 600 TABLET, FILM COATED ORAL at 05:04

## 2025-04-02 RX ADMIN — ACETAMINOPHEN 650 MG: 325 TABLET, FILM COATED ORAL at 05:04

## 2025-04-02 RX ADMIN — NIFEDIPINE 30 MG: 30 TABLET, FILM COATED, EXTENDED RELEASE ORAL at 08:04

## 2025-04-02 RX ADMIN — IBUPROFEN 600 MG: 600 TABLET, FILM COATED ORAL at 12:04

## 2025-04-02 RX ADMIN — OXYCODONE HYDROCHLORIDE 5 MG: 5 TABLET ORAL at 01:04

## 2025-04-02 RX ADMIN — ACETAMINOPHEN 650 MG: 325 TABLET, FILM COATED ORAL at 12:04

## 2025-04-02 RX ADMIN — PRENATAL VIT W/ FE FUMARATE-FA TAB 27-0.8 MG 1 TABLET: 27-0.8 TAB at 08:04

## 2025-04-02 NOTE — LACTATION NOTE
25 1310   Maternal Assessment   Breast Shape Bilateral:;pendulous   Breast Density Bilateral:;soft   Areola Bilateral:;elastic   Nipples Bilateral:;graspable;flat   Maternal Infant Feeding   Maternal Emotional State relaxed;assist needed   Equipment Type   Breast Pump Type double electric, hospital grade   Breast Pump Flange Type hard   Breast Pump Flange Size 21 mm;other (see comments)  (21 R, 18 L)   Breast Pumping   Breast Pumping Interventions frequent pumping encouraged;early pumping promoted;post-feed pumping encouraged   Breast Pumping hand expression utilized     Reviewed late  section of MARISSA morel. Assisted pt with hand expression. 1mL of colostrum collected in syringe and fed to infant.     Mother to breastfeed infant every 2-3 hours and supplement after. Encouraged to pump after each feeding d/t infant being 36 weeks, and frequent skin to skin. Reviewed breastfeeding basics. Mother is to keep infant actively feeding by keeping infant stimulated and using breast compression. Mother ensure effective nursing by hearing infant swallows and feeling nice tugs and pulls. Latch should not be painful while nursing.  Mother to record all breastfeedings, voids and stools in breastfeeding guide. Mother to call  for breastfeeding assistance or questions . Understanding verbalized.

## 2025-04-02 NOTE — ANESTHESIA POSTPROCEDURE EVALUATION
Anesthesia Post Evaluation    Patient: Julius Peguero    Procedure(s) Performed: * No procedures listed *    Final Anesthesia Type: epidural      Patient location during evaluation: med/surg floor  Patient participation: Yes- Able to Participate  Level of consciousness: awake and alert  Post-procedure vital signs: reviewed and stable  Pain management: adequate  Airway patency: patent  WALLACE mitigation strategies: Multimodal analgesia and Use of major conduction anesthesia (spinal/epidural) or peripheral nerve block  PONV status at discharge: No PONV  Anesthetic complications: no      Cardiovascular status: blood pressure returned to baseline and hemodynamically stable  Respiratory status: unassisted, spontaneous ventilation and room air  Hydration status: euvolemic  Follow-up not needed.              Vitals Value Taken Time   /77 04/02/25 14:02   Temp 36.6 °C (97.8 °F) 04/02/25 08:00   Pulse 117 04/02/25 14:21   Resp 17 04/02/25 13:48   SpO2 96 % 04/02/25 14:21   Vitals shown include unfiled device data.      No case tracking events are documented in the log.      Pain/Gilda Score: Pain Rating Prior to Med Admin: 5 (4/2/2025  1:48 PM)  Pain Rating Post Med Admin: 3 (4/1/2025  5:21 PM)

## 2025-04-02 NOTE — DISCHARGE INSTRUCTIONS
Community Resources for Breastfeeding Mothers:   Hospital Breastfeeding Centers/ Lactation Consultants:   [facility]........................................................................................[phone]  [facility]....................................................................................[phone]   [facility]..........................[phone]   [facility].................[phone]   [facility].......................................................[phone]   [facility].................................[phone]   [facility].......................................[phone]   [facility]..................................................[phone]   [facility].....................................................................[phone]      [facility] (Poison Control)...........[phone]  PoisonHelp.org   Free medical advice 24/7 through the Poison Help Line and the online tool      Online Resources:   International Breastfeeding [place] ...............................................................................ibconline.ca   Dr John Shell online resource provides videos, articles, and information sheets.     Coeffective...............................................................................................................coeffective.com   Download the free mobile christine to help get off to a great start with breastfeeding.   Droplet.....................................................................................................................RotaryView.Amicus Medicus   Global Health  Media...........................................................................................Kash.org   Videos that teach and empower mothers and caregivers   Infant [facility].............................................................................[phone]      Washio   Provides up to date information for medication use by moms during pregnancy and while breastfeeding.   Lorelei Rahman....................................................................................................................kellymom.com   Provides online information on breastfeeding and parenting      La Leche League........................................................................................ lllalmsla.org   /   llli.org   Mother-to-mother support groups with education, information support, and encouragement    Work and Pump........................................................................................... Nativis.com   Information about breastfeeding for working moms     [place] Resources:   [place] Breastfeeding [facility]............................... [phone]    [facility]   Find local breastfeeding support   [place] Breastfeeding Support............................................................ LaBreastfeedingSport.org   Zip code search of breastfeeding resources in your area   Partners for Healthy Babies............................................................[phone]   0101607cfod.org   Connects [place] moms and their families to health and pregnancy resources.  24/7   WIC (Women, Infant, Children)......................................................... [phone]   ldh.la.gov/WIC   Download the Poderopedia christine, get code from WIC office    [facility] Resources:     Baby Cafe............................................................................................................. babycafeusa.org   Free, drop in, informal  breastfeeding support groups offering professional lactation care and intervention.    [facility]/ [place] [facility]....................................... birthLowellSolar Power Technologies.Adylitica   Infant feeding drop in clinics, Lactation services, support groups, education programs   Cafe au Lait...............................................[phone]   [employer].com/groups/[facility]   Free breastfeeding support group for families of color   Mothers Milk Bank [facility]....................................................[phone]                                                             [facility]/services/[facility]s-milk-bank[facility]   [facility] Made EasyTrib[employer]Learning [employer]actation[employer].................... [email or web address]..................[phone]    CHAN Nesting..................................................................................[phone] nolanesting.com   In person and virtual support for families through pregnancy, birth, and early parenthood.       [facility] [place]- Dr. Angelika Bishop.......................[phone] [address] [place] [address]                                  [email or web address] Globalbreastfeeding.Adylitica   [email or web address]   [facility] Lactation [facility], [facility] (Vicky De La Torre RN, IBCLC) ............................[phone]    [email or web address]    Healthy Start [place].....................................[phone] ([place])  [phone] ([place])   [facility].gov/health-department/healthy-start   Serves women of childbearing age and addresses issues for pregnant women and their children from birth  to age two.          [facility]............................. PolicyBazaar.Adylitica/ [employer].Adylitica/ ceci   In person and virtual mother to mother support groups with education, information support and   encouragement to women who  want to breastfeed      [place] Resources:   Breastfeeding Resources- [facility].....msd.ms.gov (under womens services)   Find resources and info about planning for breastfeeding, its benefits, and help with breastfeeding  s uccessfully.    [facility]....................................... "[employer]   [phone]   [address] [facility] [place], [place]. Call or text 24/7   [facility].......................................................SaveOnEnergy.comcoastbreastfeedingcenter.Powerspan   [facility] Lactation Consultants sere [facility], including [facility],   [facility], and surrounding areas.    [place] Breastfeeding Coalition...............................................................................msbfc.org   Promotes and supports breastfeeding with families, health providers, and communities.   [facility] breastfeeding Coalition.....................................................................smbfc.org   Find breastfeeding resources and support groups in your area.    WIC Nutrition Program- [facility] of Health.................................... [place].gov

## 2025-04-02 NOTE — NURSING
Magnesium discontinued per MD order. Will check a blood pressure at 1600. No further changes at this time, will continue to monitor.

## 2025-04-02 NOTE — PROGRESS NOTES
POSTPARTUM PROGRESS NOTE    Subjective:     PPD/POD#: 1   Procedure: Vacuum-assisted vaginal delivery   EGA: 36w4d   N/V: No   F/C: No   Abd Pain: Mild, well-controlled with oral pain medication   Lochia: Mild   Voiding: Yes   Ambulating: Yes   Bowel fnc: No   Contraception: Nuvaring      Objective:      Temp:  [97.2 °F (36.2 °C)-98.9 °F (37.2 °C)] 97.9 °F (36.6 °C)  Pulse:  [] 85  Resp:  [16-18] 16  SpO2:  [92 %-100 %] 97 %  BP: (110-181)/() 110/76    Abdomen: Soft, appropriately tender   Uterus: Firm, no fundal tenderness   Incision: N/A     Lab Review    Recent Labs   Lab 03/27/25  1221 04/01/25  0515    137   K 4.0 4.4    108   CO2 18* 18*   BUN 6 9   CREATININE 0.6 0.6   BILITOT <0.5 0.2   ALKPHOS 219* 223*   ALT 16 17   AST 16 27       Recent Labs   Lab 03/27/25  1221 04/01/25  0515 04/01/25  1501 04/01/25  1504   WBC 8.54 10.45  --   --    HGB 11.0* 10.9*  --   --    HCT 33.8* 32.4* 49 49   MCV 92 91  --   --     350  --   --          I/O    Intake/Output Summary (Last 24 hours) at 4/2/2025 0613  Last data filed at 4/2/2025 0200  Gross per 24 hour   Intake 3534.04 ml   Output 2415 ml   Net 1119.04 ml        Assessment and Plan:   Postpartum care:  - Patient doing well.  - Continue routine management and advances.    PreE w/SF  - BP as above  - asymptomatic  - preE labs as above  - UOP: 1.1 cc/kg/hr  - Mag: continue  - Hypertensive agent: Procardia 30XL (4/1)    Manual extraction of placenta  -s/p 1g cefazolin     Gisel Huerta MD  Obstetrics & Gynecology, PGY-1

## 2025-04-02 NOTE — LACTATION NOTE
4C Insightshony pump, tubing, collections containers and labels brought to bedside.  Discussed proper pump setting of initiation phase.  Instructed on proper usage of pump and to adjust suction according to maximum comfort level.  Verified appropriate flange fit.  Educated on the frequency and duration of pumping in order to promote and maintain a full milk supply.  Hands on pumping technique reviewed.  Encouraged hand expression after pumping.  Instructed on cleaning of breast pump parts.  Written instructions also given.  Pt verbalized understanding and appropriate recall for proper milk handling, collection, labeling, storage and transportation.

## 2025-04-02 NOTE — PLAN OF CARE
VSS. S/p magnesium infusion. Patient ambulating and voiding independently and without difficulty. Fundus firm without massage, midline, with light rubra noted. Pain controlled with PRN pain medications. Safety maintained, bed low and in a locked position. Significant other at bedside. Pumping every 2-3 hours and supplementing with donor breast milk. No further concerns at this time, will continue to monitor.

## 2025-04-03 VITALS
SYSTOLIC BLOOD PRESSURE: 142 MMHG | BODY MASS INDEX: 30.86 KG/M2 | HEIGHT: 64 IN | RESPIRATION RATE: 18 BRPM | HEART RATE: 103 BPM | DIASTOLIC BLOOD PRESSURE: 94 MMHG | TEMPERATURE: 98 F | OXYGEN SATURATION: 98 % | WEIGHT: 180.75 LBS

## 2025-04-03 PROBLEM — D62 ABLA (ACUTE BLOOD LOSS ANEMIA): Status: ACTIVE | Noted: 2025-04-03

## 2025-04-03 PROCEDURE — 99238 HOSP IP/OBS DSCHRG MGMT 30/<: CPT | Mod: ,,, | Performed by: OBSTETRICS & GYNECOLOGY

## 2025-04-03 PROCEDURE — 0HQ9XZZ REPAIR PERINEUM SKIN, EXTERNAL APPROACH: ICD-10-PCS | Performed by: OBSTETRICS & GYNECOLOGY

## 2025-04-03 PROCEDURE — 25000003 PHARM REV CODE 250

## 2025-04-03 PROCEDURE — 0U7C7DJ DILATION OF CERVIX WITH INTRALUM DEV, TEMP, VIA OPENING: ICD-10-PCS | Performed by: OBSTETRICS & GYNECOLOGY

## 2025-04-03 PROCEDURE — 4A0HXCZ MEASUREMENT OF PRODUCTS OF CONCEPTION, CARDIAC RATE, EXTERNAL APPROACH: ICD-10-PCS | Performed by: OBSTETRICS & GYNECOLOGY

## 2025-04-03 PROCEDURE — 10907ZC DRAINAGE OF AMNIOTIC FLUID, THERAPEUTIC FROM PRODUCTS OF CONCEPTION, VIA NATURAL OR ARTIFICIAL OPENING: ICD-10-PCS | Performed by: OBSTETRICS & GYNECOLOGY

## 2025-04-03 RX ORDER — DOCUSATE SODIUM 100 MG/1
200 CAPSULE, LIQUID FILLED ORAL 2 TIMES DAILY
Status: DISCONTINUED | OUTPATIENT
Start: 2025-04-03 | End: 2025-04-03 | Stop reason: HOSPADM

## 2025-04-03 RX ORDER — FERROUS SULFATE 325(65) MG
325 TABLET, DELAYED RELEASE (ENTERIC COATED) ORAL DAILY
Qty: 30 TABLET | Refills: 11 | Status: SHIPPED | OUTPATIENT
Start: 2025-04-03

## 2025-04-03 RX ORDER — LANOLIN ALCOHOL/MO/W.PET/CERES
1 CREAM (GRAM) TOPICAL DAILY
Status: DISCONTINUED | OUTPATIENT
Start: 2025-04-03 | End: 2025-04-03 | Stop reason: HOSPADM

## 2025-04-03 RX ADMIN — IBUPROFEN 600 MG: 600 TABLET, FILM COATED ORAL at 05:04

## 2025-04-03 RX ADMIN — ACETAMINOPHEN 650 MG: 325 TABLET, FILM COATED ORAL at 01:04

## 2025-04-03 RX ADMIN — PRENATAL VIT W/ FE FUMARATE-FA TAB 27-0.8 MG 1 TABLET: 27-0.8 TAB at 07:04

## 2025-04-03 RX ADMIN — IBUPROFEN 600 MG: 600 TABLET, FILM COATED ORAL at 01:04

## 2025-04-03 RX ADMIN — FERROUS SULFATE TAB 325 MG (65 MG ELEMENTAL FE) 1 EACH: 325 (65 FE) TAB at 07:04

## 2025-04-03 RX ADMIN — DOCUSATE SODIUM 200 MG: 100 CAPSULE, LIQUID FILLED ORAL at 07:04

## 2025-04-03 RX ADMIN — ACETAMINOPHEN 650 MG: 325 TABLET, FILM COATED ORAL at 05:04

## 2025-04-03 RX ADMIN — NIFEDIPINE 30 MG: 30 TABLET, FILM COATED, EXTENDED RELEASE ORAL at 07:04

## 2025-04-03 NOTE — DISCHARGE SUMMARY
Delivery Discharge Summary  Obstetrics      Primary OB Clinician: Alysia Lazo MD      Admission date: 2025  Discharge date: 2025    Disposition: To home, self care    Discharge Diagnosis List:    Problem List[1]    Procedure:  Vacuum-Assisted Vaginal Delivery     Hospital Course:  Julius Peguero is a 24 y.o. now , PPD #2 who was admitted on 2025 at 36w4d for IOL 2/2 newly diagnosed cHTN w/ AUSTIN w/ SF (BP). Patient was subsequently admitted to labor and delivery unit with signed consents. She was also initiated on MgSO4 for maternal seizure prophylaxis.     This IUP was complicated by cHTN w/ AUSTIN w/ SF (BP), FGR (EFW 5%, Normal UAD), GBS unknown status, and anemia.     Labor course was complicated by non-reassuring fetal status during second stage of labor and resulted in VAVD without complications. Manual placental extraction was performed and 1g cefoxitin was administered.    Please see delivery note for further details. Her postpartum course was uncomplicated. She was initiated on Procardia 30XL and remained stable on this regimen. On discharge day, patient's pain is controlled with oral pain medications. Pt is tolerating ambulation without SOB or CP, and regular diet without N/V. Reports lochia is mild. Denies any HA, vision changes, F/C, LE swelling. Denies any breast pain/soreness.    Pt in stable condition and ready for discharge. She has been instructed to start and/or continue medications and follow up with her obstetrics provider as listed below.    Pertinent studies:  CBC  Recent Labs   Lab 25  1221 25  0515 25  1501 25  1504 25  0516   WBC 8.54 10.45  --   --  21.25*   HGB 11.0* 10.9*  --   --  8.8*   HCT 33.8* 32.4* 49 49 27.2*   MCV 92 91  --   --  92    350  --   --  242        Immunization History   Administered Date(s) Administered    Tdap 02/10/2025        Delivery:    Episiotomy: None   Lacerations: 1st   Repair suture:     Repair #  "of packets:     Blood loss (ml):       Birth information:  YOB: 2024   Time of birth: 2:48 PM   Sex: male   Delivery type: Vaginal, Vacuum (Extractor)   Gestational Age: 36w4d     Measurements    Weight: 2250 g  Weight (lbs): 4 lb 15.4 oz  Length: 45.7 cm  Length (in): 18"  Head circumference: 37.7 cm  Chest circumference: 28 cm         Delivery Clinician: Delivery Providers    Delivering clinician: Blanche Castellon MD   Provider Role    Selean Fuller MD Soileau, Madeleine, RN King, Bailey, RN              Additional  information:  Forceps:    Vacuum:    Breech:    Observed anomalies      Living?:     Apgars    Living status: Living  Apgar Component Scores:  1 min.:  5 min.:  10 min.:  15 min.:  20 min.:    Skin color:  0  1       Heart rate:  1  2       Reflex irritability:  0  1       Muscle tone:  0  2       Respiratory effort:  0  1       Total:  1  7       Apgars assigned by: NICU         Placenta: Delivered:       appearance    Patient Instructions:   Current Discharge Medication List        START taking these medications    Details   acetaminophen (TYLENOL) 650 MG TbSR Take 1 tablet (650 mg total) by mouth every 6 (six) hours. Alternate between ibuprofen and tylenol every 3 hours. For example: @0800: ibuprofen 600mg @1100: tylenol 650mg @1400: ibuprofen 600mg @1700: tylenol 650 mg @2000: ibuprofen 600mg  Qty: 60 tablet, Refills: 1      docusate sodium (COLACE) 100 MG capsule Take 1 capsule (100 mg total) by mouth 2 (two) times daily.  Qty: 60 capsule, Refills: 1      ferrous sulfate 325 (65 FE) MG EC tablet Take 1 tablet (325 mg total) by mouth once daily.  Qty: 30 tablet, Refills: 11      ibuprofen (ADVIL,MOTRIN) 600 MG tablet Take 1 tablet (600 mg total) by mouth every 6 (six) hours. Alternate between ibuprofen and tylenol every 3 hours. For example: @0800: ibuprofen 600mg @1100: tylenol 650mg @1400: ibuprofen 600mg @1700: tylenol 650 mg @2000: ibuprofen 600mg  Qty: 60 tablet, " Refills: 1      NIFEdipine (PROCARDIA-XL) 30 MG (OSM) 24 hr tablet Take 1 tablet (30 mg total) by mouth once daily.  Qty: 30 tablet, Refills: 11    Comments: .           CONTINUE these medications which have NOT CHANGED    Details   prenatal vit-iron fum-folic ac 28 mg iron- 800 mcg Tab Take 1 tablet by mouth once daily.  Qty: 30 tablet, Refills: 9    Associated Diagnoses: Encounter for supervision of low-risk pregnancy, antepartum             Discharge Procedure Orders   Diet Adult Regular     Lifting restrictions   Order Comments: No lifting more than infant for six weeks.     Other restrictions (specify):     No driving until:   Order Comments: Comfortable stepping on gas and brakes     Pelvic Rest   Order Comments: Nothing in vagina, including intercourse, for at least six weeks     Notify your health care provider if you experience any of the following:  temperature >100.4     Notify your health care provider if you experience any of the following:  persistent nausea and vomiting or diarrhea     Notify your health care provider if you experience any of the following:  severe uncontrolled pain     Notify your health care provider if you experience any of the following:  redness, tenderness, or signs of infection (pain, swelling, redness, odor or green/yellow discharge around incision site)     Notify your health care provider if you experience any of the following:  severe persistent headache     Notify your health care provider if you experience any of the following:  persistent dizziness, light-headedness, or visual disturbances     Notify your health care provider if you experience any of the following:  increased confusion or weakness     Notify your health care provider if you experience any of the following:   Order Comments: Heavy vaginal bleeding, saturating more than two pads in one hour     Activity as tolerated        Follow-up Information       Alysia Lazo MD Follow up in 3 day(s).     Specialty: Obstetrics and Gynecology  Why: For BP check or take BP 2x daily on Connected MOM  Contact information:  [address] [facility] [address] [phone]               Alysia Lazo MD Follow up in 6 week(s).    Specialty: Obstetrics and Gynecology  Why: For post-partum follow-up  Contact information:  [address] [facility] [address] [phone]                              Moshe Jones MD PGY2  Obstetrics and Gynecology           [1]   Patient Active Problem List  Diagnosis    Left ovarian cyst    Severe pre-eclampsia in third trimester    Vacuum-assisted vaginal delivery    ABLA (acute blood loss anemia)

## 2025-04-03 NOTE — LACTATION NOTE
04/03/25 1155   Maternal Infant Feeding   Maternal Preparation hand hygiene   Maternal Emotional State relaxed;assist needed   Equipment Type   Breast Pump Type double electric, hospital grade   Breast Pump Flange Type hard   Breast Pumping   Breast Pumping Interventions frequent pumping encouraged;post-feed pumping encouraged   Breast Pumping double electric breast pump utilized     Client called after pumping for assistance/questions. LC to room and reviewed milk collection, storage, feeding guidelines. Client v/u and assisted her with paced bottle feeding pumped milk. Praise and encouragement provided, 5 ml given to infant, burped, and given 20 ml donor milk supplementation moments later.   Pumping schedule reviewed, v/u to pump 8 or more times in 24 hours. Supplementation options discussed per patient's request, benefits and risks reviewed, paced bottle feeding reviewed, all questions answered. Client stated that she desires to continue a combination of feeding EBM and donor milk to infant. MBU RN updated.

## 2025-04-03 NOTE — PROGRESS NOTES
POSTPARTUM PROGRESS NOTE    Subjective:     PPD/POD#: 2   Procedure: Vacuum-assisted vaginal delivery   EGA: 36w4d   N/V: No   F/C: No   Abd Pain: Mild, well-controlled with oral pain medication   Lochia: Mild   Voiding: Yes   Ambulating: Yes   Bowel fnc: Yes   Contraception: Nuvaring      Patient reports 5/10 that is improved with analgesics. No headache, SOB, chest pain, changes in vision or RUQ pain.   Objective:      Temp:  [97.5 °F (36.4 °C)-98.1 °F (36.7 °C)] 98.1 °F (36.7 °C)  Pulse:  [] 101  Resp:  [16-18] 16  SpO2:  [95 %-99 %] 97 %  BP: (115-141)/(65-88) 133/77    Abdomen: Soft, appropriately tender   Uterus: Firm, no fundal tenderness   Incision: N/A     Lab Review    Recent Labs   Lab 03/27/25  1221 04/01/25  0515    137   K 4.0 4.4    108   CO2 18* 18*   BUN 6 9   CREATININE 0.6 0.6   BILITOT <0.5 0.2   ALKPHOS 219* 223*   ALT 16 17   AST 16 27       Recent Labs   Lab 03/27/25  1221 04/01/25  0515 04/01/25  1501 04/01/25  1504 04/02/25  0516   WBC 8.54 10.45  --   --  21.25*   HGB 11.0* 10.9*  --   --  8.8*   HCT 33.8* 32.4* 49 49 27.2*   MCV 92 91  --   --  92    350  --   --  242         I/O    Intake/Output Summary (Last 24 hours) at 4/3/2025 0622  Last data filed at 4/2/2025 1635  Gross per 24 hour   Intake 1019.04 ml   Output 2300 ml   Net -1280.96 ml        Assessment and Plan:   Postpartum care:  - Patient doing well.  - Continue routine management and advances.    PreE w/SF  - BP as above  - asymptomatic  - preE labs as above  - UOP: 2.3 cc/kg/hr - last measured output  - Mag: S/p  - Hypertensive agent: Procardia 30XL (4/1)    Manual extraction of placenta  - s/p 1g cefazolin     Anemia - Acute blood loss, expected post operatively  - H/H as above  - QBL: 250mL  - asymptomatic  - iron/colace     Flor Tolbert MD  OBGYN   PGY-1

## 2025-04-03 NOTE — PLAN OF CARE
Problem:  Fall Injury Risk  Goal: Absence of Fall, Infant Drop and Related Injury  Outcome: Met     Problem: Adult Inpatient Plan of Care  Goal: Plan of Care Review  Outcome: Met  Goal: Patient-Specific Goal (Individualized)  Outcome: Met  Goal: Absence of Hospital-Acquired Illness or Injury  Outcome: Met  Goal: Optimal Comfort and Wellbeing  Outcome: Met  Goal: Readiness for Transition of Care  Outcome: Met     Problem: Infection  Goal: Absence of Infection Signs and Symptoms  Outcome: Met     Problem: Anesthesia/Analgesia, Neuraxial  Goal: Safe, Effective Infusion Delivery  Outcome: Met  Goal: Stable Patient-Fetal Status  Outcome: Met  Goal: Absence of Infection Signs and Symptoms  Outcome: Met  Goal: Nausea and Vomiting Relief  Outcome: Met  Goal: Effective Pain Control  Outcome: Met  Goal: Effective Oxygenation and Ventilation  Outcome: Met  Goal: Baseline Motor Function Return  Outcome: Met  Goal: Effective Urinary Elimination  Outcome: Met     Problem: Hypertensive Disorders in Pregnancy  Goal: Patient-Fetal Stabilization  Outcome: Met     Problem: Postpartum (Vaginal Delivery)  Goal: Successful Parent Role Transition  Outcome: Met  Goal: Hemostasis  Outcome: Met  Goal: Absence of Infection Signs and Symptoms  Outcome: Met  Goal: Anesthesia/Sedation Recovery  Outcome: Met  Goal: Optimal Pain Control and Function  Outcome: Met  Goal: Effective Urinary Elimination  Outcome: Met     Problem: Breastfeeding  Goal: Effective Breastfeeding  Outcome: Met

## 2025-04-03 NOTE — LACTATION NOTE
04/03/25 0905   Maternal Assessment   Breast Shape Bilateral:;round   Breast Density Bilateral:;soft   Areola Bilateral:;elastic   Nipples Bilateral:;graspable;flat   Maternal Infant Feeding   Maternal Preparation breast care;hand hygiene   Maternal Emotional State relaxed;assist needed   Latch Assistance no   Equipment Type   Breast Pump Type double electric, hospital grade   Breast Pump Flange Type hard   Breast Pumping   Breast Pumping Interventions frequent pumping encouraged;post-feed pumping encouraged   Breast Pumping hand expression utilized     LC to room: introduced self, client in bed, RN at bedside for sugar check (see results review). Encouraged to feed infant post-check, offered to assist and client accepted assistance with bilateral hand expression. 0.3 ml collected total and finger fed infant via PO syringe. Supplemented with donor milk per client preference. Benefits of donor vs formula supplementation reviewed.   Reviewed feeding POC - encouraged to feed infant on cue 8 or more times in 24 hours, late pre-term expectations reviewed and v/u to offer feeding every 2-3 hours of no feeding cues seen. Pump post-feeds and supplement PRN with EBM and donor milk.   All questions answered, client v/u, extension on whiteboard to call PRN. MBU RN updated.

## 2025-04-04 ENCOUNTER — PATIENT MESSAGE (OUTPATIENT)
Dept: OBSTETRICS AND GYNECOLOGY | Facility: OTHER | Age: 25
End: 2025-04-04
Payer: MEDICAID

## 2025-04-04 ENCOUNTER — PATIENT MESSAGE (OUTPATIENT)
Dept: OBSTETRICS AND GYNECOLOGY | Facility: CLINIC | Age: 25
End: 2025-04-04
Payer: MEDICAID

## 2025-04-04 ENCOUNTER — TELEPHONE (OUTPATIENT)
Dept: OBSTETRICS AND GYNECOLOGY | Facility: CLINIC | Age: 25
End: 2025-04-04
Payer: MEDICAID

## 2025-04-04 ENCOUNTER — LACTATION ENCOUNTER (OUTPATIENT)
Dept: OBSTETRICS AND GYNECOLOGY | Facility: OTHER | Age: 25
End: 2025-04-04

## 2025-04-04 LAB
ESTROGEN SERPL-MCNC: NORMAL PG/ML
INSULIN SERPL-ACNC: NORMAL U[IU]/ML
LAB AP DIAGNOSIS CATEGORY: NORMAL
LAB AP GROSS DESCRIPTION: NORMAL
LAB AP PERFORMING LOCATION(S): NORMAL
LAB AP REPORT FOOTNOTES: NORMAL

## 2025-04-04 NOTE — TELEPHONE ENCOUNTER
----- Message from LUCRECIA Seay sent at 4/4/2025  1:19 PM CDT -----  Hi, can you please call [facility] to schedule a post partum blood pressure check appt on Monday 4/7 (can be with any provider)  pt was jaguar w/Eloy w/SF and does NOT have a working bp cuff at home, thank you

## 2025-04-04 NOTE — NURSING
The following message was sent to [facility] staff:  Hi, can you please call [facility] to schedule a post partum blood pressure check appt on Monday 4/7 (can be with any provider)  pt was dx w/preE w/SF and does NOT have a working bp cuff at home, thank you

## 2025-04-04 NOTE — LACTATION NOTE
This note was copied from a baby's chart.  Discharge lactation education reviewed with breastfeeding guide and community resources given, reviewed.     Mom expressing 2oz EBM now, educated to use maintain setting on symphony pump. Pt has motif twist for home use, discussed rental pump option, 1 month rental to establish full milk supply.     Baby had been supplemented with formula overnight per pediatrician recommendation and EBM.     Encouraged to use EBM. Ped resident and student to bedside for assessment.      number on board to call for any questions or concerns. Rental pump at bedside.      04/04/25 0900   Nutrition   Feeding Readiness Cues rooting   Feeding Method breastfeeding supplementation   Breastfeeding Session   Breastfeeding Left Side (min) 0 Min   Breastfeeding Supplementation   Infant Indication for Supplementation prematurity   Breastfeeding Supplementation Type expressed breast milk   Method of Supplementation paced bottle   Nipple Used For Supplementation slow flow  (purple nipple)   Feeding Tolerance   Feeding Tolerance/Success coordinated suck;coordinated swallow;sleepy   Nutrition Interventions   Hypoglycemia Management breastfeeding promoted   Oral Nutrition Promotion breastfeeding promoted;cue-based feedings promoted   Intake   Expressed Breast Milk - PO Intake (mL) 30 mL   Unmeasured   Urine Occurrence 1   Stool Occurrence 1

## 2025-04-05 ENCOUNTER — LACTATION ENCOUNTER (OUTPATIENT)
Dept: OBSTETRICS AND GYNECOLOGY | Facility: OTHER | Age: 25
End: 2025-04-05

## 2025-04-05 NOTE — LACTATION NOTE
This note was copied from a baby's chart.  0932: Lactation Round: CARLOS introduced self. Pt's mother continues to pump for baby. Pt's mother denies having questions at this time. LC assisted with washing and sanitizing pump pieces.  Pt's mother to call when ready to pump.  1407: LC assisted with pumping with Pt's mother mother. Fresh expressed breastmilk labeled and placed in the refrigerator. Ice packs applied. LC encouraged Pt's mother to call for additional assistance as needed. LC confirmed number on board.

## 2025-04-07 ENCOUNTER — LACTATION ENCOUNTER (OUTPATIENT)
Dept: OBSTETRICS AND GYNECOLOGY | Facility: OTHER | Age: 25
End: 2025-04-07

## 2025-04-07 NOTE — LACTATION NOTE
This note was copied from a baby's chart.  Lactation Round: Pt's mother called for assistance. Pt's mother explained that she needs ice packs for engorgement. LC assessed Pt's mother breast. Pt's mother explained that that it has been a couple hours since pumping. LC assisted Pt's mother with pumping. LC reminded Pt's mother of the importance of using oldest milk first. Due to Pt's extended stay, LC extended rental pump due date. All questions answered. Pt's mother aware to let MBU nurse if further lactation assistance is needed.

## 2025-04-11 ENCOUNTER — POSTPARTUM VISIT (OUTPATIENT)
Dept: OBSTETRICS AND GYNECOLOGY | Facility: CLINIC | Age: 25
End: 2025-04-11
Payer: MEDICAID

## 2025-04-11 VITALS
BODY MASS INDEX: 27.63 KG/M2 | DIASTOLIC BLOOD PRESSURE: 86 MMHG | SYSTOLIC BLOOD PRESSURE: 125 MMHG | WEIGHT: 161.81 LBS | HEIGHT: 64 IN

## 2025-04-11 PROCEDURE — 99999 PR PBB SHADOW E&M-EST. PATIENT-LVL III: CPT | Mod: PBBFAC,,, | Performed by: ADVANCED PRACTICE MIDWIFE

## 2025-04-11 PROCEDURE — 99213 OFFICE O/P EST LOW 20 MIN: CPT | Mod: PBBFAC | Performed by: ADVANCED PRACTICE MIDWIFE

## 2025-04-11 NOTE — PROGRESS NOTES
"Chief Complaint: Blood Pressure Check      HPI:  24 y.o. here for BP check.  Pt is 10 day s/p   Pregnancy complicated by CHTN with s/I pre e with severe features (BP). Pt received magnesium sulfate therapy on admit. Pt denies complaints of HA/Vision changes/abdominal cramping/swelling/urination problems. She is taking  procardia 30mg XL.   Bonding well with baby, currently pumping feeding.     ROS   Systemic: Not feeling tired (fatigue).  No fever chills   Gastrointestinal: No nausea, vomiting, no abdominal pain.  No diarrhea.  Genitourinary: No dysuria. No Pelvic Pain  Skin: No rash.    /86   Ht 5' 4" (1.626 m)   Wt 73.4 kg (161 lb 13.1 oz)   Breastfeeding Yes   BMI 27.78 kg/m²        PE:  Vitals: Normal and reviewed       Plan:  1. PP with Pre-E BP check--manual BP stable. Continue medications as ordered and discussed s/s  to report back to the office.     RTC for pp visit  "

## 2025-04-12 NOTE — PHYSICIAN QUERY
Please clarify the pathology findings:  Pathology findings noted above are not confirmed as diagnoses

## 2025-04-21 ENCOUNTER — PATIENT MESSAGE (OUTPATIENT)
Facility: CLINIC | Age: 25
End: 2025-04-21
Payer: MEDICAID

## 2025-04-22 ENCOUNTER — CLINICAL SUPPORT (OUTPATIENT)
Facility: CLINIC | Age: 25
End: 2025-04-22
Payer: MEDICAID

## 2025-04-22 VITALS
TEMPERATURE: 98 F | RESPIRATION RATE: 16 BRPM | OXYGEN SATURATION: 100 % | HEART RATE: 69 BPM | DIASTOLIC BLOOD PRESSURE: 85 MMHG | SYSTOLIC BLOOD PRESSURE: 123 MMHG

## 2025-04-22 NOTE — PROGRESS NOTES
Post Visit Nursing Note  Maternal Note  In-Home Visit    [facility] Consent: Yes     Home visit completed 2025. This is the initial visit to the home by a Family [facility] nurse.     Return Visit Needed: Yes   (If yes) Return visit date: 2025    Maternal History:    Julius Peguero is a 24 y.o. female Black or , who delivered at 36w4d GA via .    Julius Peguero experienced the following pregnancy and delivery complications during this most recent pregnancy: cHTN w/ [place] w/ SF (BP), FGR (EFW 5%, Normal UAD), GBS unknown status, and anemia.     Vaccine History:   Immunization History   Administered Date(s) Administered    DTaP 2001, 2001, 2002, 12/10/2002, 02/15/2007    HIB 2001, 2001    HPV 9-Valent 02/10/2017    HPV Quadrivalent 2012    Hep B / HiB 2002    Hepatitis A, Pediatric/Adolescent, 2 Dose 02/10/2017, 2018    Hepatitis B 2001    Hepatitis B, Pediatric/Adolescent 2001    Hib-HbOC 2002    IPV 2001, 2001, 2002, 02/15/2007    Influenza - Trivalent - PF (PED) 2011    MMR 2002    MMRV 02/15/2007    Meningococcal Conjugate (MCV4P) 2012, 2018    Pneumococcal Conjugate - 7 Valent 2001, 2002    Tdap 2012, 02/10/2025    Varicella 2002       Maternal Assessment:    Vital Signs During Home Visit:   /85 (BP Location: Left arm, Patient Position: Sitting)   Pulse 69   Temp 97.8 °F (36.6 °C) (Temporal)   Resp 16   SpO2 100%   Breastfeeding Yes   Essie  Depression Scale (EPDS) During Home Visit: 8  Feeding: breast and bottle  Relationship Risk: 0  Substance Use Risk: 0  Contraception: NuvaRing vaginal inserts  Plan for Additional Children: No    Subjective:  Patient is a 24-year-old female who delivered a pre-term infant boy on 2025 via vacuum-assisted vaginal delivery at 36 weeks and 4 days due to Pre-Eclampsia. During today's visit, the  patient reported no acute concerns. She expressed interest in discussing topics related to breastfeeding, childcare, and general guidance as a first-time parent.   Objective:    Postpartum Assessment:  Lochia: Scant, no foul odor noted.  Vital Signs: Stable  Blood Pressure: Managed appropriately with Procardia, as reported by the patient.  Plan:    Continue to monitor blood pressure and adhere to medication regimen.  Provide education and support on breastfeeding techniques and childcare resources.  Assist the patient in searching for childcare options as requested.  Schedule a follow-up visit to further assess postpartum recovery and address any new concerns.    Home Environment:    Home is safe, equipped with smoke and CO2 detectors to ensure their well-being.      Referrals:    Enroll [facility]  [facility]  W.I.C.    Education Materials Provided:      POST-BIRTH Warning Signs from [facility]  POST-BIRTH Warning Signs Education Program   [facility]   Healthy Sleep: For You and Baby   Infant Warning Signs   [facility]    Recommended Immunization Schedule from [facility]   Building Strength with Tummy Time   Infant Crying & Shaken Baby Syndrome   How to Keep Your Child Safe in the Car   Lead Poisoning Risk Checklist from [place] [facility]  Lead Poisoning Prevention   What Does a Safe Sleep Environment Look Like? from NIH  Safe to Sleep®   Home Safety Checklist from [employer]  Home Safety Checklist   [facility] [facility]    Mental Health Disorders from [employer]   National Maternal Mental Health Hotline   Online Support Meetings from [employer]  Weekly Online Support Group  Snuggles and Struggles from [facility]    [facility] Connect   How to care for baby's skin  Hear the Beep where you Sleep

## 2025-05-05 ENCOUNTER — PATIENT MESSAGE (OUTPATIENT)
Dept: PSYCHIATRY | Facility: CLINIC | Age: 25
End: 2025-05-05
Payer: MEDICAID

## 2025-05-05 ENCOUNTER — POSTPARTUM VISIT (OUTPATIENT)
Dept: OBSTETRICS AND GYNECOLOGY | Facility: CLINIC | Age: 25
End: 2025-05-05
Payer: MEDICAID

## 2025-05-05 VITALS
DIASTOLIC BLOOD PRESSURE: 73 MMHG | WEIGHT: 161.63 LBS | BODY MASS INDEX: 27.74 KG/M2 | SYSTOLIC BLOOD PRESSURE: 131 MMHG | HEART RATE: 65 BPM

## 2025-05-05 DIAGNOSIS — I10 CHRONIC HYPERTENSION: ICD-10-CM

## 2025-05-05 DIAGNOSIS — Z30.09 BIRTH CONTROL COUNSELING: ICD-10-CM

## 2025-05-05 PROCEDURE — 0503F POSTPARTUM CARE VISIT: CPT | Mod: ,,, | Performed by: OBSTETRICS & GYNECOLOGY

## 2025-05-05 PROCEDURE — 99999 PR PBB SHADOW E&M-EST. PATIENT-LVL III: CPT | Mod: PBBFAC,,, | Performed by: OBSTETRICS & GYNECOLOGY

## 2025-05-05 PROCEDURE — 99213 OFFICE O/P EST LOW 20 MIN: CPT | Mod: PBBFAC | Performed by: OBSTETRICS & GYNECOLOGY

## 2025-05-05 RX ORDER — NIFEDIPINE 30 MG/1
30 TABLET, EXTENDED RELEASE ORAL DAILY
Qty: 30 TABLET | Refills: 11 | Status: SHIPPED | OUTPATIENT
Start: 2025-05-05 | End: 2026-05-05

## 2025-05-05 RX ORDER — SERTRALINE HYDROCHLORIDE 25 MG/1
25 TABLET, FILM COATED ORAL DAILY
Qty: 30 TABLET | Refills: 2 | Status: SHIPPED | OUTPATIENT
Start: 2025-05-05 | End: 2026-05-05

## 2025-05-05 NOTE — PROGRESS NOTES
"Past medical, surgical, social, family, and obstetric histories; medications; prior records and results; and available outside records were reviewed and updated in the EMR.  Pertinent findings were noted below.    Reason for Visit   Postpartum Care    HPI   24 y.o. female     No LMP recorded.    Delivery: VAVD on 2025 c/b FGR and cHTN w/ SI PreE  Hospitalization: discharged on Procardia 30XL  Ambulating, tolerating Po, moving bowels: Y  Pain controlled: Y  Bleeding: N  Breastfeeding: Y-pumping 2x/day, supplementing with formula  Breast pain or engorgement: N  Postpartum depression: mood is "so-so" FOB works out of town, doesn't have much help at home although lives with family, no SI/HI  Incision: N  Contraception: None    Desires to start a medication for mood, would also like to talk to someone    Pap:  NILM  Allergies: Patient has no known allergies.    Exam   /73 (BP Location: Left arm, Patient Position: Sitting)   Pulse 65   Wt 73.3 kg (161 lb 9.6 oz)   Breastfeeding Yes Comment: some formula  BMI 27.74 kg/m²     Physical Exam  Constitutional:       General: She is not in acute distress.     Appearance: Normal appearance. She is normal weight. She is not ill-appearing.     Genitourinary:    Vulva and vagina normal.   Sutures intact.  Cervix is normal.   HENT:      Head: Normocephalic and atraumatic.   Pulmonary:      Effort: Pulmonary effort is normal.   Musculoskeletal:         General: Normal range of motion.   Neurological:      General: No focal deficit present.      Mental Status: She is alert and oriented to person, place, and time.   Psychiatric:         Mood and Affect: Mood normal.         Behavior: Behavior normal.         Thought Content: Thought content normal.         Judgment: Judgment normal.      Comments: Tearful when discussing lack of support at home   Vitals reviewed.   Extruding suture trimmed  Assessment and Plan   Postpartum exam    Birth control " counseling    Chronic hypertension  -     NIFEdipine (PROCARDIA-XL) 30 MG (OSM) 24 hr tablet; Take 1 tablet (30 mg total) by mouth once daily.  Dispense: 30 tablet; Refill: 11  -     Ambulatory referral/consult to Family Practice; Future; Expected date: 05/12/2025    Postpartum depression  -     sertraline (ZOLOFT) 25 MG tablet; Take 1 tablet (25 mg total) by mouth once daily.  Dispense: 30 tablet; Refill: 2      Postpartum  Doing well  Exam: WNL  Mood / depression screening: score 7, no SI/HI, desires to start Zoloft after counseling, signed patient up for Mindfulness classes offered by the psychiatry department, first course this Wednesday  Lactation referral: N/A - only can pump what she is doing now, knows she would need to empty breasts every 4 hours to improve supply but rather supplement with formula  Contraception: had wanted to do nuvaring but can't with CHTN, given list of options, patient will let us know what she decides  Continue Procardia at this time - will refer to PCP for CHTN    Follow-up: PRN or for annual WWE

## 2025-05-11 ENCOUNTER — PATIENT MESSAGE (OUTPATIENT)
Dept: LACTATION | Facility: CLINIC | Age: 25
End: 2025-05-11
Payer: MEDICAID

## 2025-05-13 ENCOUNTER — PATIENT MESSAGE (OUTPATIENT)
Dept: PSYCHIATRY | Facility: CLINIC | Age: 25
End: 2025-05-13
Payer: MEDICAID

## 2025-05-14 ENCOUNTER — CLINICAL SUPPORT (OUTPATIENT)
Dept: PSYCHIATRY | Facility: CLINIC | Age: 25
End: 2025-05-14

## 2025-05-14 DIAGNOSIS — Z71.9 ENCOUNTER FOR EDUCATION: Primary | ICD-10-CM

## 2025-05-14 NOTE — PROGRESS NOTES
Mindfulness Course Visit    Site: Telemedicine    The attending portion of this evaluation, treatment, and documentation was performed per Martha Roberts via Telemedicine AudioVisual using the secure [employer] software platform with two-way audio/video. The patient was located off-site and the provider is located in the hospital. The aforementioned video software was utilized to document the relevant history and physical exam.    Date: 2025    Course Focus:  mental health    Length of service: 60 minutes    Number of participants in attendance: 2    Target symptoms: Stress, adjustment during  period    Participant response: Active Listening    Progress toward goals: Progressing adequately    Interval History: Session #1: Intro to Mindfulness. Orientation of group structure and rules, Introduction to Stress and Distress, Introduction to Mindfulness, Mindful Breathing, and Body Scan practice. Participants were given the homework to practice breathing and/or body scan every day.     Diagnosis:    ICD-10-CM ICD-9-CM   1. Encounter for education  Z71.9 V65.40      Plan: Continue treatment    Karon Roberts, MPH, MS  Psychology Intern  Supervisor: Bernice Allen, PhD

## 2025-05-23 ENCOUNTER — PATIENT MESSAGE (OUTPATIENT)
Dept: OBSTETRICS AND GYNECOLOGY | Facility: CLINIC | Age: 25
End: 2025-05-23
Payer: MEDICAID

## (undated) DEVICE — SYS SEE SHARP SCOPE ANTIFOG

## (undated) DEVICE — SEALER LIGASURE LAP 37CM 5MM

## (undated) DEVICE — PENCIL ELECTROSURG HOLST W/BLD

## (undated) DEVICE — GLOVE BIOGEL SKINSENSE PI 6.5

## (undated) DEVICE — JELLY SURGILUBE 5GR

## (undated) DEVICE — GLOVE BIOGEL SKINSENSE PI 6.0

## (undated) DEVICE — IRRIGATOR ENDOSCOPY DISP.

## (undated) DEVICE — SOL BETADINE 5%

## (undated) DEVICE — TRAY FOLEY 16FR INFECTION CONT

## (undated) DEVICE — TROCAR KII FIOS 5MM X 100MM

## (undated) DEVICE — PACK LAPAROSCOPY BAPTIST

## (undated) DEVICE — SEE L#120831

## (undated) DEVICE — ELECTRODE REM PLYHSV RETURN 9

## (undated) DEVICE — SUT MCRYL PLUS 4-0 PS2 27IN

## (undated) DEVICE — KIT WING PAD POSITIONING

## (undated) DEVICE — SOL PVP-I SCRUB 7.5% 4OZ

## (undated) DEVICE — SOL NS 1000CC

## (undated) DEVICE — BAG TISSUE RETRIEVAL 5MM

## (undated) DEVICE — SYR 10CC LUER LOCK